# Patient Record
Sex: MALE | Race: WHITE | NOT HISPANIC OR LATINO | Employment: UNEMPLOYED | URBAN - METROPOLITAN AREA
[De-identification: names, ages, dates, MRNs, and addresses within clinical notes are randomized per-mention and may not be internally consistent; named-entity substitution may affect disease eponyms.]

---

## 2020-01-17 ENCOUNTER — TELEPHONE (OUTPATIENT)
Dept: PSYCHIATRY | Facility: CLINIC | Age: 40
End: 2020-01-17

## 2020-01-17 NOTE — TELEPHONE ENCOUNTER
Behavorial Health Outpatient Intake Questions    Referred by: Yes     [employer] insurance    Please advised interviewee that they need to answer all questions truthfully to allow for best care and any misrepresentations of information may affect their ability to be seen at this clinic   => Was this discussed? Yes     Behavorial Health Outpatient Intake History -     Presenting Problem (in patient's words):   Severe anxiety and insonminia  Diagnosed in a facility in [place]  He needs a specific medication (Remeron Lopazopene)    Has the patient ever seen or is currently seeing a psychiatrist? Yes   If yes who/when? Psychiatrist in [place]  If seen as outpatient, have they been seen here (and by whom)? If not seen here, which provider(s) did the patient see and for how long? Has the patient ever seen or currently see a therapist? No If yes who/when? Has a member of the patient's family been in therapy here? Yes  If yes, with whom? Pssibly his sister  Has the patient been hospitalized for mental health? Yes   If yes, how long ago was last hospitalization and where was it? In 2013 at a hospital in [place], [address] [place] [address] Dr Kaur? Substance Abuse:No concerns of substance abuse are reported  Used to drink but does not drink anymore  Does the patient have ICM or CTT? No    Is the patient taking injectable psychiatric medications? No    => If yes, patient cannot be seen here  Communications  Are there any developmental disabilities? No    Does the patient have hearing impairment? No       History-    Has the patient served in the [place] [address]? No    If yes, have you had combat services? No    Was the patient activated into federal active duty as a member of the [employer] or reserve? No    Legal History-     Does the patient have any history of arrests, USP/care home time, or DUIs? No  If Yes-  1) What types of charges? 2) When were they last incarcerated?   3) Are they currently on parole or probation? Minor Child-    Who has custody of the child? Is there a custody agreement? If there is a custody agreement remind parent that they must bring a copy to the first appt or they will not be seen  Intake Team, please check with provider before scheduling if flags come up such as:  - complex case  - legal history (other than DUI)  - communication barrier concerns are present  - if, in your judgment, this needs further review    ACCEPTED as a patient Yes  => Appointment Date: 2/18/2020  Dr Lit Moss at [address] [place] [address]? No    Name of Insurance Co: [employer] [facility] ID#  Insurance Phone #  If ins is primary or secondary  If patient is a minor, parents information such as Name, D  O B of guarantor

## 2020-01-21 ENCOUNTER — TELEPHONE (OUTPATIENT)
Dept: PSYCHIATRY | Facility: CLINIC | Age: 40
End: 2020-01-21

## 2020-01-21 NOTE — TELEPHONE ENCOUNTER
Called patient and left a message  Saulo Hassan wanted to be seen sooner than 2/18, he was waiting for Dr Jensen's days to open up for the week of 1/27  Left our number for him to call back if he was interested   In getting a sooner appointment

## 2020-01-28 PROBLEM — F41.9 ANXIETY: Status: ACTIVE | Noted: 2020-01-28

## 2020-02-05 ENCOUNTER — TELEPHONE (OUTPATIENT)
Dept: PSYCHIATRY | Facility: CLINIC | Age: 40
End: 2020-02-05

## 2020-02-05 NOTE — TELEPHONE ENCOUNTER
Called pt/ to contact intake to r/s appt w/ Dr Grant Jersey  Unable to leave msg/voicemail is not set up at this time

## 2020-02-21 ENCOUNTER — TELEPHONE (OUTPATIENT)
Dept: PSYCHIATRY | Facility: CLINIC | Age: 40
End: 2020-02-21

## 2020-04-20 ENCOUNTER — TELEMEDICINE (OUTPATIENT)
Dept: PSYCHIATRY | Facility: CLINIC | Age: 40
End: 2020-04-20

## 2020-04-20 DIAGNOSIS — F33.1 MDD (MAJOR DEPRESSIVE DISORDER), RECURRENT EPISODE, MODERATE (HCC): ICD-10-CM

## 2020-04-20 DIAGNOSIS — F41.1 GENERALIZED ANXIETY DISORDER: Primary | ICD-10-CM

## 2020-04-20 DIAGNOSIS — F42.9 OBSESSIVE-COMPULSIVE DISORDER WITH GOOD OR FAIR INSIGHT: ICD-10-CM

## 2020-04-20 DIAGNOSIS — F40.10 SOCIAL ANXIETY DISORDER: ICD-10-CM

## 2020-04-20 PROCEDURE — 99205 OFFICE O/P NEW HI 60 MIN: CPT | Performed by: PSYCHIATRY & NEUROLOGY

## 2020-04-20 RX ORDER — MIRTAZAPINE 15 MG/1
15 TABLET, FILM COATED ORAL
Qty: 30 TABLET | Refills: 0 | Status: SHIPPED | OUTPATIENT
Start: 2020-04-20 | End: 2020-05-20

## 2020-05-19 DIAGNOSIS — F33.1 MDD (MAJOR DEPRESSIVE DISORDER), RECURRENT EPISODE, MODERATE (HCC): ICD-10-CM

## 2020-05-19 DIAGNOSIS — F41.1 GENERALIZED ANXIETY DISORDER: ICD-10-CM

## 2020-05-20 ENCOUNTER — TELEMEDICINE (OUTPATIENT)
Dept: PSYCHIATRY | Facility: CLINIC | Age: 40
End: 2020-05-20

## 2020-05-20 DIAGNOSIS — F41.1 GENERALIZED ANXIETY DISORDER: ICD-10-CM

## 2020-05-20 DIAGNOSIS — F33.1 MDD (MAJOR DEPRESSIVE DISORDER), RECURRENT EPISODE, MODERATE (HCC): ICD-10-CM

## 2020-05-20 PROCEDURE — 99214 OFFICE O/P EST MOD 30 MIN: CPT | Performed by: PSYCHIATRY & NEUROLOGY

## 2020-05-20 RX ORDER — MIRTAZAPINE 15 MG/1
22.5 TABLET, FILM COATED ORAL
Qty: 45 TABLET | Refills: 1 | Status: SHIPPED | OUTPATIENT
Start: 2020-05-20 | End: 2020-06-16

## 2020-05-20 RX ORDER — MIRTAZAPINE 15 MG/1
TABLET, FILM COATED ORAL
Qty: 30 TABLET | Refills: 0 | Status: SHIPPED | OUTPATIENT
Start: 2020-05-20 | End: 2020-05-20 | Stop reason: SDUPTHER

## 2020-05-20 RX ORDER — HYDROXYZINE HYDROCHLORIDE 25 MG/1
25 TABLET, FILM COATED ORAL 3 TIMES DAILY PRN
Qty: 75 TABLET | Refills: 1 | Status: SHIPPED | OUTPATIENT
Start: 2020-05-20 | End: 2020-11-13 | Stop reason: SDUPTHER

## 2020-06-14 DIAGNOSIS — F33.1 MDD (MAJOR DEPRESSIVE DISORDER), RECURRENT EPISODE, MODERATE (HCC): ICD-10-CM

## 2020-06-14 DIAGNOSIS — F41.1 GENERALIZED ANXIETY DISORDER: ICD-10-CM

## 2020-06-16 RX ORDER — MIRTAZAPINE 15 MG/1
TABLET, FILM COATED ORAL
Qty: 30 TABLET | Refills: 0 | Status: SHIPPED | OUTPATIENT
Start: 2020-06-16 | End: 2020-10-19 | Stop reason: SDUPTHER

## 2020-10-11 DIAGNOSIS — F33.1 MDD (MAJOR DEPRESSIVE DISORDER), RECURRENT EPISODE, MODERATE (HCC): ICD-10-CM

## 2020-10-11 DIAGNOSIS — F41.1 GENERALIZED ANXIETY DISORDER: ICD-10-CM

## 2020-10-19 DIAGNOSIS — F41.1 GENERALIZED ANXIETY DISORDER: ICD-10-CM

## 2020-10-19 DIAGNOSIS — F33.1 MDD (MAJOR DEPRESSIVE DISORDER), RECURRENT EPISODE, MODERATE (HCC): ICD-10-CM

## 2020-10-19 RX ORDER — MIRTAZAPINE 15 MG/1
15 TABLET, FILM COATED ORAL
Qty: 30 TABLET | Refills: 0 | Status: SHIPPED | OUTPATIENT
Start: 2020-10-19 | End: 2020-11-13 | Stop reason: SDUPTHER

## 2020-10-26 RX ORDER — MIRTAZAPINE 15 MG/1
TABLET, FILM COATED ORAL
Qty: 30 TABLET | Refills: 0 | OUTPATIENT
Start: 2020-10-26

## 2020-11-13 ENCOUNTER — TELEMEDICINE (OUTPATIENT)
Dept: PSYCHIATRY | Facility: CLINIC | Age: 40
End: 2020-11-13

## 2020-11-13 DIAGNOSIS — F42.9 OBSESSIVE-COMPULSIVE DISORDER WITH GOOD OR FAIR INSIGHT: ICD-10-CM

## 2020-11-13 DIAGNOSIS — F40.10 SOCIAL ANXIETY DISORDER: ICD-10-CM

## 2020-11-13 DIAGNOSIS — F41.1 GENERALIZED ANXIETY DISORDER: ICD-10-CM

## 2020-11-13 DIAGNOSIS — F33.1 MDD (MAJOR DEPRESSIVE DISORDER), RECURRENT EPISODE, MODERATE (HCC): Primary | ICD-10-CM

## 2020-11-13 PROCEDURE — 99213 OFFICE O/P EST LOW 20 MIN: CPT | Performed by: PSYCHIATRY & NEUROLOGY

## 2020-11-13 RX ORDER — HYDROXYZINE 50 MG/1
50 TABLET, FILM COATED ORAL 3 TIMES DAILY PRN
Qty: 75 TABLET | Refills: 1 | Status: SHIPPED | OUTPATIENT
Start: 2020-11-13 | End: 2021-01-29 | Stop reason: HOSPADM

## 2020-11-13 RX ORDER — MIRTAZAPINE 30 MG/1
30 TABLET, FILM COATED ORAL
Qty: 30 TABLET | Refills: 1 | Status: SHIPPED | OUTPATIENT
Start: 2020-11-13 | End: 2021-01-08

## 2020-12-18 DIAGNOSIS — F33.1 MDD (MAJOR DEPRESSIVE DISORDER), RECURRENT EPISODE, MODERATE (HCC): ICD-10-CM

## 2020-12-18 DIAGNOSIS — F41.1 GENERALIZED ANXIETY DISORDER: ICD-10-CM

## 2020-12-21 RX ORDER — MIRTAZAPINE 15 MG/1
TABLET, FILM COATED ORAL
Qty: 30 TABLET | Refills: 0 | OUTPATIENT
Start: 2020-12-21

## 2021-01-08 DIAGNOSIS — F41.1 GENERALIZED ANXIETY DISORDER: ICD-10-CM

## 2021-01-08 DIAGNOSIS — F33.1 MDD (MAJOR DEPRESSIVE DISORDER), RECURRENT EPISODE, MODERATE (HCC): ICD-10-CM

## 2021-01-08 RX ORDER — MIRTAZAPINE 30 MG/1
30 TABLET, FILM COATED ORAL
Qty: 30 TABLET | Refills: 1 | Status: SHIPPED | OUTPATIENT
Start: 2021-01-08 | End: 2021-04-04

## 2021-01-20 ENCOUNTER — HOSPITAL ENCOUNTER (INPATIENT)
Facility: HOSPITAL | Age: 41
LOS: 8 days | Discharge: HOME HEALTH | DRG: 064 | End: 2021-01-29
Attending: EMERGENCY MEDICINE | Admitting: INTERNAL MEDICINE

## 2021-01-20 DIAGNOSIS — R77.8 ELEVATED TROPONIN: ICD-10-CM

## 2021-01-20 DIAGNOSIS — I63.9 ACUTE CVA (CEREBROVASCULAR ACCIDENT) (HCC): ICD-10-CM

## 2021-01-20 DIAGNOSIS — R79.89 ELEVATED LFTS: ICD-10-CM

## 2021-01-20 DIAGNOSIS — K72.00 ACUTE LIVER FAILURE WITHOUT HEPATIC COMA: ICD-10-CM

## 2021-01-20 DIAGNOSIS — N17.9 AKI (ACUTE KIDNEY INJURY) (HCC): ICD-10-CM

## 2021-01-20 DIAGNOSIS — R41.82 ALTERED MENTAL STATUS: Primary | ICD-10-CM

## 2021-01-20 DIAGNOSIS — I63.512 ACUTE ISCHEMIC LEFT MCA STROKE (HCC): ICD-10-CM

## 2021-01-20 DIAGNOSIS — M62.82 RHABDOMYOLYSIS: ICD-10-CM

## 2021-01-20 DIAGNOSIS — I21.4 NSTEMI (NON-ST ELEVATED MYOCARDIAL INFARCTION) (HCC): ICD-10-CM

## 2021-01-20 LAB
ATRIAL RATE: 85 BPM
BASOPHILS # BLD AUTO: 0.03 THOUSANDS/ΜL (ref 0–0.1)
BASOPHILS NFR BLD AUTO: 0 % (ref 0–1)
EOSINOPHIL # BLD AUTO: 0.06 THOUSAND/ΜL (ref 0–0.61)
EOSINOPHIL NFR BLD AUTO: 1 % (ref 0–6)
ERYTHROCYTE [DISTWIDTH] IN BLOOD BY AUTOMATED COUNT: 17.6 % (ref 11.6–15.1)
HCT VFR BLD AUTO: 35.5 % (ref 36.5–49.3)
HGB BLD-MCNC: 10.1 G/DL (ref 12–17)
IMM GRANULOCYTES # BLD AUTO: 0.03 THOUSAND/UL (ref 0–0.2)
IMM GRANULOCYTES NFR BLD AUTO: 0 % (ref 0–2)
LYMPHOCYTES # BLD AUTO: 1.4 THOUSANDS/ΜL (ref 0.6–4.47)
LYMPHOCYTES NFR BLD AUTO: 15 % (ref 14–44)
MCH RBC QN AUTO: 19.7 PG (ref 26.8–34.3)
MCHC RBC AUTO-ENTMCNC: 28.5 G/DL (ref 31.4–37.4)
MCV RBC AUTO: 69 FL (ref 82–98)
MONOCYTES # BLD AUTO: 0.83 THOUSAND/ΜL (ref 0.17–1.22)
MONOCYTES NFR BLD AUTO: 9 % (ref 4–12)
NEUTROPHILS # BLD AUTO: 7.1 THOUSANDS/ΜL (ref 1.85–7.62)
NEUTS SEG NFR BLD AUTO: 75 % (ref 43–75)
NRBC BLD AUTO-RTO: 0 /100 WBCS
P AXIS: 45 DEGREES
PLATELET # BLD AUTO: 295 THOUSANDS/UL (ref 149–390)
PMV BLD AUTO: 10.3 FL (ref 8.9–12.7)
PR INTERVAL: 150 MS
QRS AXIS: 75 DEGREES
QRSD INTERVAL: 94 MS
QT INTERVAL: 360 MS
QTC INTERVAL: 428 MS
RBC # BLD AUTO: 5.12 MILLION/UL (ref 3.88–5.62)
T WAVE AXIS: -63 DEGREES
VENTRICULAR RATE: 85 BPM
WBC # BLD AUTO: 9.45 THOUSAND/UL (ref 4.31–10.16)

## 2021-01-20 PROCEDURE — 93005 ELECTROCARDIOGRAM TRACING: CPT

## 2021-01-20 PROCEDURE — 99291 CRITICAL CARE FIRST HOUR: CPT | Performed by: EMERGENCY MEDICINE

## 2021-01-20 PROCEDURE — 93010 ELECTROCARDIOGRAM REPORT: CPT

## 2021-01-20 PROCEDURE — 0241U HB NFCT DS VIR RESP RNA 4 TRGT: CPT | Performed by: EMERGENCY MEDICINE

## 2021-01-20 PROCEDURE — 99291 CRITICAL CARE FIRST HOUR: CPT

## 2021-01-20 PROCEDURE — 82553 CREATINE MB FRACTION: CPT | Performed by: EMERGENCY MEDICINE

## 2021-01-20 PROCEDURE — 36415 COLL VENOUS BLD VENIPUNCTURE: CPT | Performed by: EMERGENCY MEDICINE

## 2021-01-20 PROCEDURE — 85025 COMPLETE CBC W/AUTO DIFF WBC: CPT | Performed by: EMERGENCY MEDICINE

## 2021-01-20 PROCEDURE — 80053 COMPREHEN METABOLIC PANEL: CPT | Performed by: EMERGENCY MEDICINE

## 2021-01-20 PROCEDURE — 90715 TDAP VACCINE 7 YRS/> IM: CPT | Performed by: EMERGENCY MEDICINE

## 2021-01-20 PROCEDURE — 96365 THER/PROPH/DIAG IV INF INIT: CPT

## 2021-01-20 PROCEDURE — 82550 ASSAY OF CK (CPK): CPT | Performed by: EMERGENCY MEDICINE

## 2021-01-20 PROCEDURE — 84484 ASSAY OF TROPONIN QUANT: CPT | Performed by: EMERGENCY MEDICINE

## 2021-01-20 RX ORDER — SODIUM CHLORIDE, SODIUM GLUCONATE, SODIUM ACETATE, POTASSIUM CHLORIDE, MAGNESIUM CHLORIDE, SODIUM PHOSPHATE, DIBASIC, AND POTASSIUM PHOSPHATE .53; .5; .37; .037; .03; .012; .00082 G/100ML; G/100ML; G/100ML; G/100ML; G/100ML; G/100ML; G/100ML
2000 INJECTION, SOLUTION INTRAVENOUS ONCE
Status: COMPLETED | OUTPATIENT
Start: 2021-01-20 | End: 2021-01-21

## 2021-01-20 RX ADMIN — SODIUM CHLORIDE, SODIUM GLUCONATE, SODIUM ACETATE, POTASSIUM CHLORIDE, MAGNESIUM CHLORIDE, SODIUM PHOSPHATE, DIBASIC, AND POTASSIUM PHOSPHATE 2000 ML: .53; .5; .37; .037; .03; .012; .00082 INJECTION, SOLUTION INTRAVENOUS at 23:30

## 2021-01-20 RX ADMIN — TETANUS TOXOID, REDUCED DIPHTHERIA TOXOID AND ACELLULAR PERTUSSIS VACCINE, ADSORBED 0.5 ML: 5; 2.5; 8; 8; 2.5 SUSPENSION INTRAMUSCULAR at 23:31

## 2021-01-20 RX ADMIN — THIAMINE HYDROCHLORIDE 500 MG: 100 INJECTION, SOLUTION INTRAMUSCULAR; INTRAVENOUS at 23:51

## 2021-01-21 ENCOUNTER — APPOINTMENT (INPATIENT)
Dept: NON INVASIVE DIAGNOSTICS | Facility: HOSPITAL | Age: 41
DRG: 064 | End: 2021-01-21

## 2021-01-21 ENCOUNTER — APPOINTMENT (EMERGENCY)
Dept: CT IMAGING | Facility: HOSPITAL | Age: 41
DRG: 064 | End: 2021-01-21

## 2021-01-21 ENCOUNTER — APPOINTMENT (INPATIENT)
Dept: CT IMAGING | Facility: HOSPITAL | Age: 41
DRG: 064 | End: 2021-01-21

## 2021-01-21 PROBLEM — I16.0 HYPERTENSIVE URGENCY: Status: ACTIVE | Noted: 2021-01-21

## 2021-01-21 PROBLEM — I63.9 ACUTE CVA (CEREBROVASCULAR ACCIDENT) (HCC): Status: ACTIVE | Noted: 2021-01-21

## 2021-01-21 PROBLEM — E66.9 SUPER OBESE: Chronic | Status: ACTIVE | Noted: 2021-01-21

## 2021-01-21 PROBLEM — K72.00 ACUTE LIVER FAILURE WITHOUT HEPATIC COMA: Status: ACTIVE | Noted: 2021-01-21

## 2021-01-21 PROBLEM — M62.82 RHABDOMYOLYSIS: Status: ACTIVE | Noted: 2021-01-21

## 2021-01-21 PROBLEM — F19.10 DRUG ABUSE (HCC): Chronic | Status: ACTIVE | Noted: 2021-01-21

## 2021-01-21 PROBLEM — F10.10 ALCOHOL ABUSE: Chronic | Status: ACTIVE | Noted: 2021-01-21

## 2021-01-21 PROBLEM — R79.89 ELEVATED SERUM CREATININE: Status: ACTIVE | Noted: 2021-01-21

## 2021-01-21 PROBLEM — D53.9 MACROCYTIC ANEMIA: Status: ACTIVE | Noted: 2021-01-21

## 2021-01-21 PROBLEM — I21.4 NSTEMI (NON-ST ELEVATED MYOCARDIAL INFARCTION) (HCC): Status: ACTIVE | Noted: 2021-01-21

## 2021-01-21 LAB
ALBUMIN SERPL BCP-MCNC: 2.9 G/DL (ref 3.5–5)
ALBUMIN SERPL BCP-MCNC: 3.3 G/DL (ref 3.5–5)
ALP SERPL-CCNC: 59 U/L (ref 46–116)
ALP SERPL-CCNC: 70 U/L (ref 46–116)
ALT SERPL W P-5'-P-CCNC: 2552 U/L (ref 12–78)
ALT SERPL W P-5'-P-CCNC: 3144 U/L (ref 12–78)
AMPHETAMINES SERPL QL SCN: NEGATIVE
ANION GAP SERPL CALCULATED.3IONS-SCNC: 7 MMOL/L (ref 4–13)
ANION GAP SERPL CALCULATED.3IONS-SCNC: 8 MMOL/L (ref 4–13)
APAP SERPL-MCNC: <2 UG/ML (ref 10–20)
APTT PPP: 35 SECONDS (ref 23–37)
APTT PPP: 80 SECONDS (ref 23–37)
APTT PPP: 86 SECONDS (ref 23–37)
AST SERPL W P-5'-P-CCNC: 1099 U/L (ref 5–45)
AST SERPL W P-5'-P-CCNC: 743 U/L (ref 5–45)
ATRIAL RATE: 70 BPM
ATRIAL RATE: 73 BPM
BACTERIA UR QL AUTO: ABNORMAL /HPF
BARBITURATES UR QL: NEGATIVE
BASOPHILS # BLD AUTO: 0.05 THOUSANDS/ΜL (ref 0–0.1)
BASOPHILS NFR BLD AUTO: 1 % (ref 0–1)
BENZODIAZ UR QL: POSITIVE
BILIRUB SERPL-MCNC: 0.94 MG/DL (ref 0.2–1)
BILIRUB SERPL-MCNC: 1.01 MG/DL (ref 0.2–1)
BILIRUB UR QL STRIP: NEGATIVE
BUN SERPL-MCNC: 17 MG/DL (ref 5–25)
BUN SERPL-MCNC: 23 MG/DL (ref 5–25)
CALCIUM ALBUM COR SERPL-MCNC: 8.9 MG/DL (ref 8.3–10.1)
CALCIUM ALBUM COR SERPL-MCNC: 9.6 MG/DL (ref 8.3–10.1)
CALCIUM SERPL-MCNC: 8 MG/DL (ref 8.3–10.1)
CALCIUM SERPL-MCNC: 9 MG/DL (ref 8.3–10.1)
CHLORIDE SERPL-SCNC: 102 MMOL/L (ref 100–108)
CHLORIDE SERPL-SCNC: 105 MMOL/L (ref 100–108)
CHOLEST SERPL-MCNC: 87 MG/DL (ref 50–200)
CK MB SERPL-MCNC: 20.2 NG/ML (ref 0–5)
CK MB SERPL-MCNC: 9.1 NG/ML (ref 0–5)
CK MB SERPL-MCNC: <1 % (ref 0–2.5)
CK MB SERPL-MCNC: <1 % (ref 0–2.5)
CK SERPL-CCNC: 5776 U/L (ref 39–308)
CK SERPL-CCNC: ABNORMAL U/L (ref 39–308)
CLARITY UR: CLEAR
CO2 SERPL-SCNC: 30 MMOL/L (ref 21–32)
CO2 SERPL-SCNC: 31 MMOL/L (ref 21–32)
COCAINE UR QL: NEGATIVE
COLOR UR: YELLOW
CREAT SERPL-MCNC: 1.19 MG/DL (ref 0.6–1.3)
CREAT SERPL-MCNC: 1.34 MG/DL (ref 0.6–1.3)
EOSINOPHIL # BLD AUTO: 0.15 THOUSAND/ΜL (ref 0–0.61)
EOSINOPHIL NFR BLD AUTO: 2 % (ref 0–6)
ERYTHROCYTE [DISTWIDTH] IN BLOOD BY AUTOMATED COUNT: 17.8 % (ref 11.6–15.1)
EST. AVERAGE GLUCOSE BLD GHB EST-MCNC: 120 MG/DL
ETHANOL SERPL-MCNC: <3 MG/DL (ref 0–3)
FERRITIN SERPL-MCNC: 29 NG/ML (ref 8–388)
FLUAV RNA RESP QL NAA+PROBE: NEGATIVE
FLUBV RNA RESP QL NAA+PROBE: NEGATIVE
FOLATE SERPL-MCNC: >20 NG/ML (ref 3.1–17.5)
GFR SERPL CREATININE-BSD FRML MDRD: 65 ML/MIN/1.73SQ M
GFR SERPL CREATININE-BSD FRML MDRD: 75 ML/MIN/1.73SQ M
GLUCOSE SERPL-MCNC: 83 MG/DL (ref 65–140)
GLUCOSE SERPL-MCNC: 89 MG/DL (ref 65–140)
GLUCOSE UR STRIP-MCNC: NEGATIVE MG/DL
HAV IGM SER QL: NORMAL
HBA1C MFR BLD: 5.8 %
HBV CORE IGM SER QL: NORMAL
HBV SURFACE AG SER QL: NORMAL
HCT VFR BLD AUTO: 32.3 % (ref 36.5–49.3)
HCV AB SER QL: NORMAL
HDLC SERPL-MCNC: 25 MG/DL
HGB BLD-MCNC: 9 G/DL (ref 12–17)
HGB UR QL STRIP.AUTO: ABNORMAL
IMM GRANULOCYTES # BLD AUTO: 0.05 THOUSAND/UL (ref 0–0.2)
IMM GRANULOCYTES NFR BLD AUTO: 1 % (ref 0–2)
INR PPP: 1.36 (ref 0.84–1.19)
IRON SATN MFR SERPL: 6 %
IRON SERPL-MCNC: 23 UG/DL (ref 65–175)
KETONES UR STRIP-MCNC: NEGATIVE MG/DL
LDLC SERPL CALC-MCNC: 46 MG/DL (ref 0–100)
LEUKOCYTE ESTERASE UR QL STRIP: NEGATIVE
LIPASE SERPL-CCNC: 104 U/L (ref 73–393)
LYMPHOCYTES # BLD AUTO: 2.24 THOUSANDS/ΜL (ref 0.6–4.47)
LYMPHOCYTES NFR BLD AUTO: 23 % (ref 14–44)
MAGNESIUM SERPL-MCNC: 2.1 MG/DL (ref 1.6–2.6)
MCH RBC QN AUTO: 19.5 PG (ref 26.8–34.3)
MCHC RBC AUTO-ENTMCNC: 27.9 G/DL (ref 31.4–37.4)
MCV RBC AUTO: 70 FL (ref 82–98)
METHADONE UR QL: NEGATIVE
MONOCYTES # BLD AUTO: 0.96 THOUSAND/ΜL (ref 0.17–1.22)
MONOCYTES NFR BLD AUTO: 10 % (ref 4–12)
NEUTROPHILS # BLD AUTO: 6.32 THOUSANDS/ΜL (ref 1.85–7.62)
NEUTS SEG NFR BLD AUTO: 63 % (ref 43–75)
NITRITE UR QL STRIP: NEGATIVE
NON-SQ EPI CELLS URNS QL MICRO: ABNORMAL /HPF
NRBC BLD AUTO-RTO: 0 /100 WBCS
OPIATES UR QL SCN: NEGATIVE
OXYCODONE+OXYMORPHONE UR QL SCN: NEGATIVE
P AXIS: 25 DEGREES
P AXIS: 53 DEGREES
PCP UR QL: NEGATIVE
PH UR STRIP.AUTO: 7 [PH] (ref 4.5–8)
PLATELET # BLD AUTO: 251 THOUSANDS/UL (ref 149–390)
PMV BLD AUTO: 10.2 FL (ref 8.9–12.7)
POTASSIUM SERPL-SCNC: 3.8 MMOL/L (ref 3.5–5.3)
POTASSIUM SERPL-SCNC: 4 MMOL/L (ref 3.5–5.3)
PR INTERVAL: 148 MS
PR INTERVAL: 150 MS
PROT SERPL-MCNC: 6.7 G/DL (ref 6.4–8.2)
PROT SERPL-MCNC: 7.6 G/DL (ref 6.4–8.2)
PROT UR STRIP-MCNC: NEGATIVE MG/DL
PROTHROMBIN TIME: 16.5 SECONDS (ref 11.6–14.5)
QRS AXIS: 53 DEGREES
QRS AXIS: 63 DEGREES
QRSD INTERVAL: 92 MS
QRSD INTERVAL: 96 MS
QT INTERVAL: 382 MS
QT INTERVAL: 396 MS
QTC INTERVAL: 420 MS
QTC INTERVAL: 427 MS
RBC # BLD AUTO: 4.62 MILLION/UL (ref 3.88–5.62)
RBC #/AREA URNS AUTO: ABNORMAL /HPF
RSV RNA RESP QL NAA+PROBE: NEGATIVE
SALICYLATES SERPL-MCNC: <3 MG/DL (ref 3–20)
SARS-COV-2 RNA RESP QL NAA+PROBE: NEGATIVE
SODIUM SERPL-SCNC: 141 MMOL/L (ref 136–145)
SODIUM SERPL-SCNC: 142 MMOL/L (ref 136–145)
SP GR UR STRIP.AUTO: 1.01 (ref 1–1.03)
T WAVE AXIS: -49 DEGREES
T WAVE AXIS: -62 DEGREES
THC UR QL: NEGATIVE
TIBC SERPL-MCNC: 359 UG/DL (ref 250–450)
TRIGL SERPL-MCNC: 80 MG/DL
TROPONIN I SERPL-MCNC: 10 NG/ML
TROPONIN I SERPL-MCNC: 7.76 NG/ML
TROPONIN I SERPL-MCNC: 9.33 NG/ML
UROBILINOGEN UR QL STRIP.AUTO: 1 E.U./DL
VENTRICULAR RATE: 70 BPM
VENTRICULAR RATE: 73 BPM
VIT B12 SERPL-MCNC: 2191 PG/ML (ref 100–900)
WBC # BLD AUTO: 9.77 THOUSAND/UL (ref 4.31–10.16)
WBC #/AREA URNS AUTO: ABNORMAL /HPF

## 2021-01-21 PROCEDURE — 83540 ASSAY OF IRON: CPT | Performed by: NURSE PRACTITIONER

## 2021-01-21 PROCEDURE — 82746 ASSAY OF FOLIC ACID SERUM: CPT | Performed by: NURSE PRACTITIONER

## 2021-01-21 PROCEDURE — 81001 URINALYSIS AUTO W/SCOPE: CPT

## 2021-01-21 PROCEDURE — 84484 ASSAY OF TROPONIN QUANT: CPT | Performed by: NURSE PRACTITIONER

## 2021-01-21 PROCEDURE — 96361 HYDRATE IV INFUSION ADD-ON: CPT

## 2021-01-21 PROCEDURE — 82553 CREATINE MB FRACTION: CPT | Performed by: NURSE PRACTITIONER

## 2021-01-21 PROCEDURE — 82607 VITAMIN B-12: CPT | Performed by: NURSE PRACTITIONER

## 2021-01-21 PROCEDURE — 97163 PT EVAL HIGH COMPLEX 45 MIN: CPT

## 2021-01-21 PROCEDURE — 70496 CT ANGIOGRAPHY HEAD: CPT

## 2021-01-21 PROCEDURE — 83690 ASSAY OF LIPASE: CPT | Performed by: EMERGENCY MEDICINE

## 2021-01-21 PROCEDURE — 80061 LIPID PANEL: CPT | Performed by: NURSE PRACTITIONER

## 2021-01-21 PROCEDURE — 70450 CT HEAD/BRAIN W/O DYE: CPT

## 2021-01-21 PROCEDURE — 83735 ASSAY OF MAGNESIUM: CPT | Performed by: NURSE PRACTITIONER

## 2021-01-21 PROCEDURE — 99255 IP/OBS CONSLTJ NEW/EST HI 80: CPT | Performed by: PSYCHIATRY & NEUROLOGY

## 2021-01-21 PROCEDURE — 99223 1ST HOSP IP/OBS HIGH 75: CPT | Performed by: HOSPITALIST

## 2021-01-21 PROCEDURE — 85730 THROMBOPLASTIN TIME PARTIAL: CPT | Performed by: INTERNAL MEDICINE

## 2021-01-21 PROCEDURE — 80074 ACUTE HEPATITIS PANEL: CPT | Performed by: NURSE PRACTITIONER

## 2021-01-21 PROCEDURE — 85730 THROMBOPLASTIN TIME PARTIAL: CPT | Performed by: EMERGENCY MEDICINE

## 2021-01-21 PROCEDURE — 97166 OT EVAL MOD COMPLEX 45 MIN: CPT

## 2021-01-21 PROCEDURE — 92523 SPEECH SOUND LANG COMPREHEN: CPT

## 2021-01-21 PROCEDURE — 96367 TX/PROPH/DG ADDL SEQ IV INF: CPT

## 2021-01-21 PROCEDURE — G1004 CDSM NDSC: HCPCS

## 2021-01-21 PROCEDURE — 93306 TTE W/DOPPLER COMPLETE: CPT | Performed by: INTERNAL MEDICINE

## 2021-01-21 PROCEDURE — 99254 IP/OBS CNSLTJ NEW/EST MOD 60: CPT | Performed by: INTERNAL MEDICINE

## 2021-01-21 PROCEDURE — 93005 ELECTROCARDIOGRAM TRACING: CPT

## 2021-01-21 PROCEDURE — 85025 COMPLETE CBC W/AUTO DIFF WBC: CPT | Performed by: NURSE PRACTITIONER

## 2021-01-21 PROCEDURE — 71260 CT THORAX DX C+: CPT

## 2021-01-21 PROCEDURE — 93306 TTE W/DOPPLER COMPLETE: CPT

## 2021-01-21 PROCEDURE — 74177 CT ABD & PELVIS W/CONTRAST: CPT

## 2021-01-21 PROCEDURE — 80179 DRUG ASSAY SALICYLATE: CPT | Performed by: EMERGENCY MEDICINE

## 2021-01-21 PROCEDURE — 70498 CT ANGIOGRAPHY NECK: CPT

## 2021-01-21 PROCEDURE — 83036 HEMOGLOBIN GLYCOSYLATED A1C: CPT | Performed by: NURSE PRACTITIONER

## 2021-01-21 PROCEDURE — 83550 IRON BINDING TEST: CPT | Performed by: NURSE PRACTITIONER

## 2021-01-21 PROCEDURE — 94762 N-INVAS EAR/PLS OXIMTRY CONT: CPT

## 2021-01-21 PROCEDURE — 82077 ASSAY SPEC XCP UR&BREATH IA: CPT | Performed by: EMERGENCY MEDICINE

## 2021-01-21 PROCEDURE — 85730 THROMBOPLASTIN TIME PARTIAL: CPT | Performed by: HOSPITALIST

## 2021-01-21 PROCEDURE — 80053 COMPREHEN METABOLIC PANEL: CPT | Performed by: NURSE PRACTITIONER

## 2021-01-21 PROCEDURE — 85610 PROTHROMBIN TIME: CPT | Performed by: EMERGENCY MEDICINE

## 2021-01-21 PROCEDURE — 36415 COLL VENOUS BLD VENIPUNCTURE: CPT | Performed by: EMERGENCY MEDICINE

## 2021-01-21 PROCEDURE — 93010 ELECTROCARDIOGRAM REPORT: CPT | Performed by: INTERNAL MEDICINE

## 2021-01-21 PROCEDURE — 80307 DRUG TEST PRSMV CHEM ANLYZR: CPT | Performed by: EMERGENCY MEDICINE

## 2021-01-21 PROCEDURE — 82728 ASSAY OF FERRITIN: CPT | Performed by: NURSE PRACTITIONER

## 2021-01-21 PROCEDURE — 80143 DRUG ASSAY ACETAMINOPHEN: CPT | Performed by: EMERGENCY MEDICINE

## 2021-01-21 PROCEDURE — 72125 CT NECK SPINE W/O DYE: CPT

## 2021-01-21 PROCEDURE — 82550 ASSAY OF CK (CPK): CPT | Performed by: NURSE PRACTITIONER

## 2021-01-21 RX ORDER — CLOPIDOGREL BISULFATE 75 MG/1
75 TABLET ORAL DAILY
Status: DISCONTINUED | OUTPATIENT
Start: 2021-01-22 | End: 2021-01-26

## 2021-01-21 RX ORDER — CLOPIDOGREL BISULFATE 75 MG/1
600 TABLET ORAL ONCE
Status: COMPLETED | OUTPATIENT
Start: 2021-01-21 | End: 2021-01-21

## 2021-01-21 RX ORDER — OXYCODONE HYDROCHLORIDE 5 MG/1
5 TABLET ORAL EVERY 6 HOURS PRN
Status: DISCONTINUED | OUTPATIENT
Start: 2021-01-21 | End: 2021-01-29 | Stop reason: HOSPADM

## 2021-01-21 RX ORDER — HEPARIN SODIUM 10000 [USP'U]/100ML
3-20 INJECTION, SOLUTION INTRAVENOUS
Status: DISCONTINUED | OUTPATIENT
Start: 2021-01-21 | End: 2021-01-22

## 2021-01-21 RX ORDER — NITROGLYCERIN 0.4 MG/1
0.4 TABLET SUBLINGUAL
Status: DISCONTINUED | OUTPATIENT
Start: 2021-01-21 | End: 2021-01-29 | Stop reason: HOSPADM

## 2021-01-21 RX ORDER — HEPARIN SODIUM 1000 [USP'U]/ML
4000 INJECTION, SOLUTION INTRAVENOUS; SUBCUTANEOUS
Status: DISCONTINUED | OUTPATIENT
Start: 2021-01-21 | End: 2021-01-22

## 2021-01-21 RX ORDER — SODIUM CHLORIDE 9 MG/ML
150 INJECTION, SOLUTION INTRAVENOUS CONTINUOUS
Status: DISCONTINUED | OUTPATIENT
Start: 2021-01-21 | End: 2021-01-23

## 2021-01-21 RX ORDER — MAGNESIUM HYDROXIDE/ALUMINUM HYDROXICE/SIMETHICONE 120; 1200; 1200 MG/30ML; MG/30ML; MG/30ML
30 SUSPENSION ORAL EVERY 6 HOURS PRN
Status: DISCONTINUED | OUTPATIENT
Start: 2021-01-21 | End: 2021-01-29 | Stop reason: HOSPADM

## 2021-01-21 RX ORDER — ACETAMINOPHEN 325 MG/1
650 TABLET ORAL EVERY 4 HOURS PRN
Status: DISCONTINUED | OUTPATIENT
Start: 2021-01-21 | End: 2021-01-29 | Stop reason: HOSPADM

## 2021-01-21 RX ORDER — METOCLOPRAMIDE HYDROCHLORIDE 5 MG/ML
10 INJECTION INTRAMUSCULAR; INTRAVENOUS EVERY 6 HOURS PRN
Status: DISCONTINUED | OUTPATIENT
Start: 2021-01-21 | End: 2021-01-29 | Stop reason: HOSPADM

## 2021-01-21 RX ORDER — THIAMINE MONONITRATE (VIT B1) 100 MG
100 TABLET ORAL DAILY
Status: DISCONTINUED | OUTPATIENT
Start: 2021-01-21 | End: 2021-01-29 | Stop reason: HOSPADM

## 2021-01-21 RX ORDER — MIRTAZAPINE 15 MG/1
30 TABLET, FILM COATED ORAL
Status: DISCONTINUED | OUTPATIENT
Start: 2021-01-21 | End: 2021-01-29 | Stop reason: HOSPADM

## 2021-01-21 RX ORDER — SENNOSIDES 8.6 MG
2 TABLET ORAL DAILY PRN
Status: DISCONTINUED | OUTPATIENT
Start: 2021-01-21 | End: 2021-01-29 | Stop reason: HOSPADM

## 2021-01-21 RX ORDER — HYDROXYZINE HYDROCHLORIDE 25 MG/1
50 TABLET, FILM COATED ORAL 3 TIMES DAILY PRN
Status: DISCONTINUED | OUTPATIENT
Start: 2021-01-21 | End: 2021-01-29 | Stop reason: HOSPADM

## 2021-01-21 RX ORDER — FOLIC ACID 1 MG/1
1 TABLET ORAL DAILY
Status: DISCONTINUED | OUTPATIENT
Start: 2021-01-21 | End: 2021-01-29 | Stop reason: HOSPADM

## 2021-01-21 RX ORDER — HEPARIN SODIUM 1000 [USP'U]/ML
4000 INJECTION, SOLUTION INTRAVENOUS; SUBCUTANEOUS ONCE
Status: COMPLETED | OUTPATIENT
Start: 2021-01-21 | End: 2021-01-21

## 2021-01-21 RX ORDER — SODIUM CHLORIDE 9 MG/ML
100 INJECTION, SOLUTION INTRAVENOUS CONTINUOUS
Status: DISCONTINUED | OUTPATIENT
Start: 2021-01-21 | End: 2021-01-21

## 2021-01-21 RX ORDER — ASPIRIN 81 MG/1
81 TABLET ORAL DAILY
Status: DISCONTINUED | OUTPATIENT
Start: 2021-01-21 | End: 2021-01-26

## 2021-01-21 RX ORDER — ASPIRIN 325 MG
325 TABLET ORAL ONCE
Status: COMPLETED | OUTPATIENT
Start: 2021-01-21 | End: 2021-01-21

## 2021-01-21 RX ORDER — HEPARIN SODIUM 1000 [USP'U]/ML
2000 INJECTION, SOLUTION INTRAVENOUS; SUBCUTANEOUS
Status: DISCONTINUED | OUTPATIENT
Start: 2021-01-21 | End: 2021-01-22

## 2021-01-21 RX ORDER — ATORVASTATIN CALCIUM 40 MG/1
40 TABLET, FILM COATED ORAL EVERY EVENING
Status: DISCONTINUED | OUTPATIENT
Start: 2021-01-21 | End: 2021-01-29 | Stop reason: HOSPADM

## 2021-01-21 RX ADMIN — FOLIC ACID 1 MG: 5 INJECTION, SOLUTION INTRAMUSCULAR; INTRAVENOUS; SUBCUTANEOUS at 00:52

## 2021-01-21 RX ADMIN — FOLIC ACID 1 MG: 1 TABLET ORAL at 09:41

## 2021-01-21 RX ADMIN — SODIUM CHLORIDE 100 ML/HR: 0.9 INJECTION, SOLUTION INTRAVENOUS at 00:52

## 2021-01-21 RX ADMIN — ATORVASTATIN CALCIUM 40 MG: 40 TABLET, FILM COATED ORAL at 17:59

## 2021-01-21 RX ADMIN — SODIUM CHLORIDE 250 ML/HR: 0.9 INJECTION, SOLUTION INTRAVENOUS at 23:37

## 2021-01-21 RX ADMIN — SODIUM CHLORIDE 250 ML/HR: 0.9 INJECTION, SOLUTION INTRAVENOUS at 18:09

## 2021-01-21 RX ADMIN — ASPIRIN 81 MG: 81 TABLET, COATED ORAL at 09:41

## 2021-01-21 RX ADMIN — HEPARIN SODIUM 11.1 UNITS/KG/HR: 10000 INJECTION, SOLUTION INTRAVENOUS at 02:26

## 2021-01-21 RX ADMIN — PERFLUTREN 0.8 ML/MIN: 6.52 INJECTION, SUSPENSION INTRAVENOUS at 11:38

## 2021-01-21 RX ADMIN — THIAMINE HCL TAB 100 MG 100 MG: 100 TAB at 09:41

## 2021-01-21 RX ADMIN — HEPARIN SODIUM 4000 UNITS: 1000 INJECTION INTRAVENOUS; SUBCUTANEOUS at 02:25

## 2021-01-21 RX ADMIN — CLOPIDOGREL BISULFATE 600 MG: 75 TABLET ORAL at 03:15

## 2021-01-21 RX ADMIN — IOHEXOL 100 ML: 350 INJECTION, SOLUTION INTRAVENOUS at 00:46

## 2021-01-21 RX ADMIN — IOHEXOL 85 ML: 350 INJECTION, SOLUTION INTRAVENOUS at 09:25

## 2021-01-21 RX ADMIN — MIRTAZAPINE 30 MG: 15 TABLET, FILM COATED ORAL at 22:54

## 2021-01-21 RX ADMIN — Medication 1 TABLET: at 09:41

## 2021-01-21 RX ADMIN — SODIUM CHLORIDE 250 ML/HR: 0.9 INJECTION, SOLUTION INTRAVENOUS at 03:18

## 2021-01-21 RX ADMIN — ASPIRIN 325 MG ORAL TABLET 325 MG: 325 PILL ORAL at 01:20

## 2021-01-21 RX ADMIN — HYDROXYZINE HYDROCHLORIDE 50 MG: 25 TABLET ORAL at 22:54

## 2021-01-21 RX ADMIN — HEPARIN SODIUM 11.1 UNITS/KG/HR: 10000 INJECTION, SOLUTION INTRAVENOUS at 22:57

## 2021-01-21 NOTE — ED NOTES
Lab called for APTT results   Lab personal will call ASAP when results are confirmed     Linda Olguin RN  01/21/21 [id number]

## 2021-01-21 NOTE — ASSESSMENT & PLAN NOTE
- Reports of collapsing to the ground and lying on the floor for 3 days  - total CK 54387 on presentation, trending downwards, last was 1053 on 01/23  - IVF  - Management as per primary team

## 2021-01-21 NOTE — CONSULTS
[facility] - New Consultation  Yaritza Benedict 41 y o  male MRN: [id number]  Encounter: [id number]          ASSESSMENT AND PLAN:      1  Abnormal LFTs:  Patient presenting with AST elevation to 1099, ALT elevation to 3144, with T bili of 1 01  Repeat LFTs today showing improvement, with AST of 743, and ALT of 2522, with T bili of 0 94  Normal alk-phos  LFTs abnormal more of a hepatocellular pattern rather than obstructive pattern  Also noted to be in rhabdomyolysis, with significant troponin elevation  Suspect that abnormal LFTs likely secondary to alcoholic hepatitis with possible degree of shock liver in the setting of being down, and potentially hypotensive at home, although no documented hypotension since patient has been in the hospital (patient has been markedly hypertensive in the hospital)  Additionally, could potentially have some degree of DILI 2/2 reported heavy vitamin/supplement use, though exact amounts and supplement types are unclear  Tylenol level <2  Hepatitis panel negative  Mentating appropriately present time  - trend LFTs/INR daily  - check RUQ U/S  - monitor mental status closely  - DF 17; ok to defer prednisolone at present time  - avoid hepatotoxic medications  - supportive care with IVF  - discussed the crucial nature of ETOH and polysubstance/supplement discontinuation    Recommendations relayed to Dr Patsy Morales, primary team   ______________________________________________________________________    HPI:  Yaritza Benedict is a 40M with PMH of polysubstance abuse (percocet, ETOH, anabolic steroids, benzos), morbid obesity, major depressive disorder, and anxiety presents the hospital complaining of left hand, left leg pain, numbness, tingling, and confusion in the setting of polysubstance abuse, found to have a left MCA stroke  Gastroenterology was consulted to comment on patient's abnormal LFTs      Around 3-4 days ago, patient reports that he drank alcohol, took 3-4 tablets of Percocet, and took anabolic steroids  He states he fell asleep (though review of chart indicates pt may have fallen), and woke up the next day on the floor  Patient reports he drinks between 6-12 beers a day, and often binge drinks  Patient reports taking Percocet that he purchased on the street, in addition to benzos, and anabolic steroids  Patient also reports taking excessive doses of vitamins and muscle building supplements at home  Patient denies any history of hepatitis, family history of hepatitis or liver disease, and states that he has not had any known contacts with hepatitis  Denies eating raw shellfish other new or unusual foods recently  Denies any new medications  Denies any tattoos  Denies ever having a colonoscopy or endoscopy before  Denies odynophagia, dysphagia, or difficulty swallowing  Denies black or bloody stool  Denies diarrhea constipation  Patient reports occasional dyspepsia, but states that he takes p r n  antacids occasionally  Patient reports he has been to rehab before, and reports periods of sobriety in the past   Pt chews tobacco, but denies smoking  REVIEW OF SYSTEMS:    CONSTITUTIONAL: Denies any fever, chills, rigors, and weight loss  HEENT: No earache or tinnitus  Denies hearing loss or visual disturbances  CARDIOVASCULAR: No chest pain or palpitations  RESPIRATORY: Denies any cough, hemoptysis, shortness of breath or dyspnea on exertion  GASTROINTESTINAL: As noted in the History of Present Illness  GENITOURINARY: No problems with urination  Denies any hematuria or dysuria  NEUROLOGIC:  +word finding difficulties  +paresthesias  +weakness  MUSCULOSKELETAL: Denies any muscle or joint pain  SKIN: Denies skin rashes or itching  ENDOCRINE: Denies excessive thirst  Denies intolerance to heat or cold  PSYCHOSOCIAL: Denies depression or anxiety  Denies any recent memory loss         Historical Information   Past Medical History: Diagnosis Date    High blood pressure      Past Surgical History:   Procedure Laterality Date    HERNIA REPAIR       Social History   Social History     Substance and Sexual Activity   Alcohol Use Yes    Comment: states "Im trying to quit"      Social History     Substance and Sexual Activity   Drug Use Yes    Comment: rakesh      Social History     Tobacco Use   Smoking Status Unknown If Ever Smoked   Smokeless Tobacco Current User    Types: Chew     History reviewed  No pertinent family history      Meds/Allergies       Current Facility-Administered Medications:     acetaminophen (TYLENOL) tablet 650 mg, 650 mg, Oral, Q4H PRN    aluminum-magnesium hydroxide-simethicone (MYLANTA) oral suspension 30 mL, 30 mL, Oral, Q6H PRN    aspirin (ECOTRIN LOW STRENGTH) EC tablet 81 mg, 81 mg, Oral, Daily, 81 mg at 01/21/21 0941    atorvastatin (LIPITOR) tablet 40 mg, 40 mg, Oral, QPM    [COMPLETED] clopidogrel (PLAVIX) tablet 600 mg, 600 mg, Oral, Once, 600 mg at 01/21/21 0315 **AND** [START ON 1/22/2021] clopidogrel (PLAVIX) tablet 75 mg, 75 mg, Oral, Daily    folic acid (FOLVITE) tablet 1 mg, 1 mg, Oral, Daily, 1 mg at 01/21/21 0941    heparin (porcine) 25,000 units in 0 45% NaCl 250 mL infusion (premix), 3-20 Units/kg/hr (Order-Specific), Intravenous, Titrated, 11 1 Units/kg/hr at 01/21/21 0226    heparin (porcine) injection 2,000 Units, 2,000 Units, Intravenous, Q1H PRN    heparin (porcine) injection 4,000 Units, 4,000 Units, Intravenous, Q1H PRN    hydrOXYzine HCL (ATARAX) tablet 50 mg, 50 mg, Oral, TID PRN    metoclopramide (REGLAN) injection 10 mg, 10 mg, Intravenous, Q6H PRN    mirtazapine (REMERON) tablet 30 mg, 30 mg, Oral, HS    multivitamin-minerals (CENTRUM) tablet 1 tablet, 1 tablet, Oral, Daily, 1 tablet at 01/21/21 0941    nitroglycerin (NITROSTAT) SL tablet 0 4 mg, 0 4 mg, Sublingual, Q5 Min PRN    oxyCODONE (ROXICODONE) IR tablet 5 mg, 5 mg, Oral, Q6H PRN    senna (SENOKOT) tablet 17 2 mg, 2 tablet, Oral, Daily PRN    sodium chloride 0 9 % infusion, 250 mL/hr, Intravenous, Continuous, 250 mL/hr at 01/21/21 0318    thiamine (VITAMIN B1) tablet 100 mg, 100 mg, Oral, Daily, 100 mg at 01/21/21 0941    Current Outpatient Medications:     hydrOXYzine HCL (ATARAX) 50 mg tablet    mirtazapine (REMERON) 30 mg tablet    Prasterone, DHEA, 50 MG TABS    No Known Allergies        Objective     Blood pressure (!) 153/107, pulse 74, temperature 97 6 °F (36 4 °C), temperature source Oral, resp  rate 18, height 5' 9" (1 753 m), weight (!) 148 kg (326 lb 4 5 oz), SpO2 98 %  Body mass index is 48 18 kg/m²      PHYSICAL EXAM:      General Appearance:   Alert, cooperative, no distress   HEENT:   Normocephalic, atraumatic, anicteric   Neck:  Supple, symmetrical, trachea midline   Lungs:   Clear to auscultation bilaterally; respirations even and unlabored   Heart:   Regular rate and rhythm; +S1/+S2   Abdomen:   Soft, obese, non-tender, non-distended; normal bowel sounds; no masses, no organomegaly    Genitalia:   Deferred    Rectal:   Deferred    Extremities:  +1 B/L pedal edema; no cyanosis    Pulses:  2+ and symmetric    Skin:  No jaundice, rashes, or lesions          Lab Results:   Admission on 01/20/2021   Component Date Value    Ventricular Rate 01/20/2021 85     Atrial Rate 01/20/2021 85     MT Interval 01/20/2021 150     QRSD Interval 01/20/2021 94     QT Interval 01/20/2021 360     QTC Interval 01/20/2021 428     P Axis 01/20/2021 45     QRS Axis 01/20/2021 75     T Wave Axis 01/20/2021 -63     Troponin I 01/20/2021 9 33*    WBC 01/20/2021 9 45     RBC 01/20/2021 5 12     Hemoglobin 01/20/2021 10 1*    Hematocrit 01/20/2021 35 5*    MCV 01/20/2021 69*    MCH 01/20/2021 19 7*    MCHC 01/20/2021 28 5*    RDW 01/20/2021 17 6*    MPV 01/20/2021 10 3     Platelets 25/94/0596 295     nRBC 01/20/2021 0     Neutrophils Relative 01/20/2021 75     Immat GRANS % 01/20/2021 0     Lymphocytes Relative 01/20/2021 15     Monocytes Relative 01/20/2021 9     Eosinophils Relative 01/20/2021 1     Basophils Relative 01/20/2021 0     Neutrophils Absolute 01/20/2021 7 10     Immature Grans Absolute 01/20/2021 0 03     Lymphocytes Absolute 01/20/2021 1 40     Monocytes Absolute 01/20/2021 0 83     Eosinophils Absolute 01/20/2021 0 06     Basophils Absolute 01/20/2021 0 03     Sodium 01/20/2021 141     Potassium 01/20/2021 3 8     Chloride 01/20/2021 102     CO2 01/20/2021 31     ANION GAP 01/20/2021 8     BUN 01/20/2021 23     Creatinine 01/20/2021 1 34*    Glucose 01/20/2021 89     Calcium 01/20/2021 9 0     Corrected Calcium 01/20/2021 9 6     AST 01/20/2021 1,099*    ALT 01/20/2021 3,144*    Alkaline Phosphatase 01/20/2021 70     Total Protein 01/20/2021 7 6     Albumin 01/20/2021 3 3*    Total Bilirubin 01/20/2021 1 01*    eGFR 01/20/2021 65     Total CK 01/20/2021 17,316*    Amph/Meth UR 01/21/2021 Negative     Barbiturate Ur 01/21/2021 Negative     Benzodiazepine Urine 01/21/2021 Positive*    Cocaine Urine 01/21/2021 Negative     Methadone Urine 01/21/2021 Negative     Opiate Urine 01/21/2021 Negative     PCP Ur 01/21/2021 Negative     THC Urine 01/21/2021 Negative     Oxycodone Urine 01/21/2021 Negative     SARS-CoV-2 01/20/2021 Negative     INFLUENZA A PCR 01/20/2021 Negative     INFLUENZA B PCR 01/20/2021 Negative     RSV PCR 01/20/2021 Negative     Lipase 01/21/2021 104     Ethanol Lvl 22/17/1939 <3     Salicylate Lvl 37/36/4110 <3*    Acetaminophen Level 01/21/2021 <2*    PTT 01/21/2021 35     Protime 01/21/2021 16 5*    INR 01/21/2021 1 36*    Color, UA 01/21/2021 Yellow     Clarity, UA 01/21/2021 Clear     pH, UA 01/21/2021 7 0     Leukocytes, UA 01/21/2021 Negative     Nitrite, UA 01/21/2021 Negative     Protein, UA 01/21/2021 Negative     Glucose, UA 01/21/2021 Negative     Ketones, UA 01/21/2021 Negative     Urobilinogen, UA 01/21/2021 1 0  Bilirubin, UA 01/21/2021 Negative     Blood, UA 01/21/2021 Trace*    Specific Gravity, UA 01/21/2021 1 015     RBC, UA 01/21/2021 None Seen     WBC, UA 01/21/2021 0-1*    Epithelial Cells 01/21/2021 Occasional     Bacteria, UA 01/21/2021 None Seen     CK-MB Index 01/20/2021 <1 0     CK-MB 01/20/2021 20 2*    Troponin I 01/21/2021 10 00*    Hepatitis B Surface Ag 01/21/2021 Non-reactive     Hep A IgM 01/21/2021 Non-reactive     Hepatitis C Ab 01/21/2021 Non-reactive     Hep B C IgM 01/21/2021 Non-reactive     Folate 01/21/2021 >20 0*    Troponin I 01/21/2021 7 76*    Cholesterol 01/21/2021 87     Triglycerides 01/21/2021 80     HDL, Direct 01/21/2021 25*    LDL Calculated 01/21/2021 46     Hemoglobin A1C 01/21/2021 5 8*    EAG 01/21/2021 120     Sodium 01/21/2021 142     Potassium 01/21/2021 4 0     Chloride 01/21/2021 105     CO2 01/21/2021 30     ANION GAP 01/21/2021 7     BUN 01/21/2021 17     Creatinine 01/21/2021 1 19     Glucose 01/21/2021 83     Calcium 01/21/2021 8 0*    Corrected Calcium 01/21/2021 8 9     AST 01/21/2021 743*    ALT 01/21/2021 2,552*    Alkaline Phosphatase 01/21/2021 59     Total Protein 01/21/2021 6 7     Albumin 01/21/2021 2 9*    Total Bilirubin 01/21/2021 0 94     eGFR 01/21/2021 75     WBC 01/21/2021 9 77     RBC 01/21/2021 4 62     Hemoglobin 01/21/2021 9 0*    Hematocrit 01/21/2021 32 3*    MCV 01/21/2021 70*    MCH 01/21/2021 19 5*    MCHC 01/21/2021 27 9*    RDW 01/21/2021 17 8*    MPV 01/21/2021 10 2     Platelets 75/07/2841 251     nRBC 01/21/2021 0     Neutrophils Relative 01/21/2021 63     Immat GRANS % 01/21/2021 1     Lymphocytes Relative 01/21/2021 23     Monocytes Relative 01/21/2021 10     Eosinophils Relative 01/21/2021 2     Basophils Relative 01/21/2021 1     Neutrophils Absolute 01/21/2021 6 32     Immature Grans Absolute 01/21/2021 0 05     Lymphocytes Absolute 01/21/2021 2 24     Monocytes Absolute 01/21/2021 0 96     Eosinophils Absolute 01/21/2021 0 15     Basophils Absolute 01/21/2021 0 05     Magnesium 01/21/2021 2 1     Ventricular Rate 01/21/2021 73     Atrial Rate 01/21/2021 73     CO Interval 01/21/2021 148     QRSD Interval 01/21/2021 92     QT Interval 01/21/2021 382     QTC Interval 01/21/2021 420     P Axis 01/21/2021 53     QRS Axis 01/21/2021 53     T Wave Axis 01/21/2021 -62     Ventricular Rate 01/21/2021 70     Atrial Rate 01/21/2021 70     CO Interval 01/21/2021 150     QRSD Interval 01/21/2021 96     QT Interval 01/21/2021 396     QTC Interval 01/21/2021 427     P Axis 01/21/2021 25     QRS Axis 01/21/2021 63     T Wave Axis 01/21/2021 -52     Total CK 01/21/2021 5,776*    PTT 01/21/2021 80*    CK-MB Index 01/21/2021 <1 0     CK-MB 01/21/2021 9 1*         Radiology Results:   Cta Head And Neck W Wo Contrast    Result Date: 1/21/2021  Narrative: CTA NECK AND BRAIN WITH AND WITHOUT CONTRAST INDICATION: Neuro deficit, acute, stroke suspected Stroke COMPARISON:   CT performed 9 hours earlier TECHNIQUE:  Routine CT imaging of the Brain without contrast   Post contrast imaging was performed after administration of iodinated contrast through the neck and brain  Post contrast axial 0 625 mm images timed to opacify the arterial system  3D rendering was performed on an independent workstation  MIP reconstructions performed  Coronal reconstructions were performed of the noncontrast portion of the brain  Radiation dose length product (DLP) for this visit:  1846 mGy-cm   This examination, like all CT scans performed in the [facility], was performed utilizing techniques to minimize radiation dose exposure, including the use of iterative reconstruction and automated exposure control  IV Contrast:  85 mL of iohexol (OMNIPAQUE)  IMAGE QUALITY:   CTA images are degraded by quantum mottle through the level of the shoulders    Both V1 segments and proximal V2 segments poorly depicted  The vessels appear patent  FINDINGS: NONCONTRAST BRAIN PARENCHYMA:  Stable ill-defined hypodensity in the left posterior parietal parenchyma, and a discontiguous focus in the more anterior posterior left frontal parenchyma, both consistent with recent ischemia  No overt hemorrhage  Findings similar in appearance to most recent exam   Minimal local mass effect  VENTRICLES AND EXTRA-AXIAL SPACES:  Normal for the patient's age  VISUALIZED ORBITS AND PARANASAL SINUSES:  Unremarkable  CERVICAL VASCULATURE AORTIC ARCH AND GREAT VESSELS:  Normal aortic arch and great vessel origins  Normal visualized subclavian vessels  RIGHT VERTEBRAL ARTERY CERVICAL SEGMENT:  Normal origin  The vessel is normal in caliber throughout the neck  Segments of the V1 and proximal V2 are degraded by quantum mottle  LEFT VERTEBRAL ARTERY CERVICAL SEGMENT:  Normal origin  The vessel is normal in caliber throughout the neck  Segments of the V1 and proximal V2 are degraded by quantum mottle RIGHT EXTRACRANIAL CAROTID SEGMENT:  Mild atherosclerotic disease of the distal common carotid artery and proximal cervical internal carotid artery without significant stenosis compared to the more distal ICA  LEFT EXTRACRANIAL CAROTID SEGMENT:  Mild atherosclerotic disease of the distal common carotid artery and proximal cervical internal carotid artery without significant stenosis compared to the more distal ICA  NASCET criteria was used to determine the degree of internal carotid artery diameter stenosis  INTRACRANIAL VASCULATURE INTERNAL CAROTID ARTERIES:  Normal enhancement of the intracranial portions of the internal carotid arteries  Normal ophthalmic artery origins  Normal ICA terminus  Bilateral posterior communicating artery are patent ANTERIOR CIRCULATION:  Left A1 is dominant, A1 segments and anterior cerebral arteries with normal enhancement  Normal anterior communicating artery   MIDDLE CEREBRAL ARTERY CIRCULATION:  M1 segment and middle cerebral artery branches demonstrate normal enhancement bilaterally  Subtle underfilling of the distal MCA branches on the left  No focal clot or proximal obstructive disease is noted  DISTAL VERTEBRAL ARTERIES:  Normal distal vertebral arteries  Right AICA/PICA and left Posterior inferior cerebellar artery origins are normal  Normal vertebral basilar junction  BASILAR ARTERY:  Basilar artery is normal in caliber  Normal superior cerebellar arteries  POSTERIOR CEREBRAL ARTERIES: Both posterior cerebral arteries demonstrate normal enhancement  Normal posterior communicating arteries  DURAL VENOUS SINUSES:  Normal  NON VASCULAR ANATOMY BONY STRUCTURES:  No acute osseous abnormality  SOFT TISSUES OF THE NECK:  Tiny hypodense nodule right thyroid, based on size in a supine patient, no further evaluation is warranted  THORACIC INLET:  Unremarkable  Impression: Stable subacute ischemia left MCA distribution, embolic in nature based on discontiguous foci of involvement  No nancy hemorrhage, no change in minimal mass effect  No large vessel flow restrictive disease within the head or neck  Slightly diminished distal left MCA branches  Workstation performed: [id number]     Ct Head Without Contrast    Result Date: 1/21/2021  Narrative: CT BRAIN - WITHOUT CONTRAST INDICATION:   ams  Altered mental status, confusion, decreased sensation on the right, word finding difficulties, patient stated " I think I had a stroke"  Symptoms began approximately 3 days ago  NSTEMI, troponin 9 33  Transaminitis  History provided by Dr Oc Ovalle  COMPARISON:  None available  TECHNIQUE:  CT examination of the brain was performed  In addition to axial images, sagittal and coronal 2D reformatted images were created and submitted for interpretation  Radiation dose length product (DLP) for this visit:  929 mGy-cm     This examination, like all CT scans performed in the [facility], was performed utilizing techniques to minimize radiation dose exposure, including the use of iterative reconstruction and automated exposure control  IMAGE QUALITY:  Diagnostic  FINDINGS: PARENCHYMA:  There is a relatively large area of abnormal hypodensity and loss of gray-white differentiation involving the left posterior frontoparietal region  A smaller area of abnormal hypodensity and loss of gray-white differentiation is also evident more anteriorly in the left frontal lobe, near the convexity  These findings are most suggestive of subacute left MCA territory infarction  There is no evidence of hemorrhagic conversion or acute intracranial hemorrhage  There is no midline shift  VENTRICLES AND EXTRA-AXIAL SPACES:  There is no hydrocephalus  The basilar cisterns are patent  VISUALIZED ORBITS AND PARANASAL SINUSES:  There are retention cysts and mucosal thickening in the right maxillary sinus  There is mild mucosal thickening of the ethmoids  The orbits appear unremarkable  CALVARIUM AND EXTRACRANIAL SOFT TISSUES:  Normal      Impression: Subacute left MCA territory infarction, as described above  No evidence of hemorrhagic conversion  Neurology consultation and follow-up is recommended  I personally discussed this study with DR Brenna Bang on 1/21/2021 at 12:59 AM  Workstation performed: [id number]     Ct Spine Cervical Without Contrast    Result Date: 1/21/2021  Narrative: CT CERVICAL SPINE - WITHOUT CONTRAST INDICATION:   ams fall  COMPARISON:  None available  TECHNIQUE:  CT examination of the cervical spine was performed without intravenous contrast   Contiguous axial images were obtained  Sagittal and coronal reconstructions were performed  Radiation dose length product (DLP) for this visit:  714 mGy-cm     This examination, like all CT scans performed in the [facility], was performed utilizing techniques to minimize radiation dose exposure, including the use of iterative reconstruction and automated exposure control  IMAGE QUALITY:  Diagnostic  FINDINGS: ALIGNMENT:  There is mild gentle reversal of cervical lordosis  There is no subluxation  VERTEBRAL BODIES:  No fracture  DEGENERATIVE CHANGES:  Mild degenerative changes are present  PREVERTEBRAL AND PARASPINAL SOFT TISSUES:  Unremarkable  THORACIC INLET:  Please refer to the concurrent chest, abdomen, and pelvic CT report for description of the thoracic inlet findings  Impression: No cervical spine fracture or traumatic malalignment  There is mild gentle reversal of cervical lordosis  Mild degenerative changes are present  Workstation performed: [id number]     Ct Chest Abdomen Pelvis W Contrast    Result Date: 1/21/2021  Narrative: CT CHEST, ABDOMEN AND PELVIS WITH IV CONTRAST INDICATION:   ams  Subacute stroke, NSTEMI, troponin 9 33  Transaminitis  Possible fall  COMPARISON:  None available  TECHNIQUE: CT examination of the chest, abdomen and pelvis was performed  Axial, sagittal, and coronal 2D reformatted images were created from the source data and submitted for interpretation  Radiation dose length product (DLP) for this visit:  1853 mGy-cm   This examination, like all CT scans performed in the [facility], was performed utilizing techniques to minimize radiation dose exposure, including the use of iterative reconstruction and automated exposure control  IV Contrast:  100 mL of iohexol (OMNIPAQUE) Enteric Contrast: Enteric contrast was not administered  FINDINGS: CHEST LUNGS:  Lungs are clear  There is no tracheal or endobronchial lesion  PLEURA:  Unremarkable  HEART/GREAT VESSELS:  Unremarkable for patient's age  MEDIASTINUM AND BRODERICK:  There is a moderate to large hiatal hernia  CHEST WALL AND LOWER NECK:   Bilateral gynecomastia  ABDOMEN LIVER/BILIARY TREE:  Unremarkable  GALLBLADDER:  No calcified gallstones  No pericholecystic inflammatory change  SPLEEN:  Unremarkable  PANCREAS:  Unremarkable  ADRENAL GLANDS:  Unremarkable  KIDNEYS/URETERS:  Unremarkable  No hydronephrosis  STOMACH AND BOWEL:  There is a moderate to large hiatal hernia  There is no evidence of bowel obstruction  APPENDIX:  A normal appendix was visualized  ABDOMINOPELVIC CAVITY:  No ascites  No pneumoperitoneum  No lymphadenopathy  VESSELS:  Unremarkable for patient's age  PELVIS REPRODUCTIVE ORGANS:  Unremarkable for patient's age  URINARY BLADDER:  Unremarkable  ABDOMINAL WALL/INGUINAL REGIONS:  Tiny fat-containing umbilical hernia  Small fat-containing left inguinal hernia  OSSEOUS STRUCTURES:  No acute fracture or destructive osseous lesion  Impression: No evidence of acute intrathoracic, intra-abdominal or intrapelvic parenchymal organ injury  No pneumothorax  There is a moderate to large hiatal hernia  Bilateral gynecomastia  I personally discussed this study with DR Sriram Zhang on 1/21/2021 at 12:59 AM  Workstation performed: [id number]       The patient was seen and examined by Dr Elizabeth Turner, all jeff medical decisions were made with Dr Elizabeth Turner  Thank you for allowing us to participate in the care of this pleasant patient  We will follow up with you closely

## 2021-01-21 NOTE — ASSESSMENT & PLAN NOTE
· Troponin 9 3, EKG shows T-wave inversions, rate 85   Continue to trend troponin and EKG  · Given ASA bolus, will give Plavix bolus  · Continue heparin drip ACS protocol, monitor PTT  · Add ASA and atorvastatin 40 mg daily  · PRN NTG if chest pain   · Lipid panel and HgA1c   · Echocardiogram ordered  · Continuous telemetry monitoring  · Cardiology consult

## 2021-01-21 NOTE — ASSESSMENT & PLAN NOTE
· AST/ALT 1,099/3144, normal Alk P, T bili 1 01  · CT abdomen shows unremarkable liver and biliary tree, no gallstones  · Coagulopathy INR 1 3  · Will obtain acute hepatitis panel  · Avoid hepatotoxins  · GI consult

## 2021-01-21 NOTE — ASSESSMENT & PLAN NOTE
- stroke pathway ongoing:  - CTH demonstrated subacute left MCA territory infarction involving the left posterior frontal parietal region and left frontal lobe  No evidence of hemorrhagic conversion     - CTA head/neck demonstrated subtle under filling of the distal left MCA branches with no proximal obstructive disease or LVO  Otherwise unremarkable  - MRI brain unable to be performed due to patient's body habitus  CT was done about 3 days post symptoms onset - do not feel it necessary to order followup CT at this time  - ANA performed yesterday, noted moderate-sized PFO with bubble study  - lower extremity Dopplers today with acute left gastrocnemius vein DVT  - had repeat CT head today, stable L MCA infarct size, improved edema, no nancy hemorrhage  - given acute DVT, primary team planning for Lovenox to Coumadin bridge (from neurology standpoint once [facility] is started does not need antiplatelet therapy, can discontinue aspirin and Plavix)  - Atorvastatin 40mg  - thrombosis panel, following results     -hemoglobin A1C of 5 8, lipid panel with LDL of 46  - goal normotension  - Telemetry  - Secondary risk factor modification   - PT/OT/ST  - he will follow closely with interventional cardiology as an outpatient to discuss timing of PFO closure

## 2021-01-21 NOTE — ED PROVIDER NOTES
History  Chief Complaint   Patient presents with    Altered Mental Status     Pt arrives via EMS stating that he has been confused lately  Pt states "I think I've had a stroke " Reports L sided weakness  States he was lying on the floor for 3 days but can't explain why   Cough     Reports dry cough x 4 days      77-year-old male with a past medical history of alcohol abuse, performance enhancing drug use, Percocet use per patient, depression, social anxiety disorder, who presents for confusion  Patient is very poor historian  He appears disheveled  Describes that approximately 3 days ago he believes that he had a fall although he cannot over this clearly  He does not believe he hit his head but is unsure  No blood thinner use per chart  Patient reports that upon awakening today he had a right-sided decreased sensation in his right upper and lower extremities  He reports his last drink was 4 days ago  Does not describe any head or neck pain  Does describe some right-sided abdominal pain  Does report slight blurry vision with only 1 eye open, not with both eyes  Repeatedly states that he is "an idiot and a dolt"  States everything he says does not make sense  No chest pain or shortness of breath  No urinary changes  No nausea or vomiting, no diarrhea constipation  Unsure of last percocet use  Prior to Admission Medications   Prescriptions Last Dose Informant Patient Reported? Taking?    Prasterone, DHEA, 50 MG TABS   Yes No   Sig: Take by mouth   hydrOXYzine HCL (ATARAX) 50 mg tablet   No No   Sig: Take 1 tablet (50 mg total) by mouth 3 (three) times a day as needed for anxiety   mirtazapine (REMERON) 30 mg tablet   No No   Sig: TAKE 1 TABLET (30 MG TOTAL) BY MOUTH DAILY AT BEDTIME      Facility-Administered Medications: None       Past Medical History:   Diagnosis Date    High blood pressure        Past Surgical History:   Procedure Laterality Date    HERNIA REPAIR         History reviewed  No pertinent family history  I have reviewed and agree with the history as documented  E-Cigarette/Vaping     E-Cigarette/Vaping Substances     Social History     Tobacco Use    Smoking status: Unknown If Ever Smoked    Smokeless tobacco: Current User     Types: Chew   Substance Use Topics    Alcohol use: Yes     Comment: states "Im trying to quit"     Drug use: Yes     Comment: percocets         Review of Systems   Constitutional: Negative for chills, diaphoresis, fatigue and fever  HENT: Negative for congestion and sore throat  Eyes: Negative for visual disturbance  Respiratory: Negative for cough, chest tightness and shortness of breath  Cardiovascular: Negative for chest pain, palpitations and leg swelling  Gastrointestinal: Positive for abdominal pain  Negative for abdominal distention, constipation, diarrhea, nausea and vomiting  Genitourinary: Negative for difficulty urinating and dysuria  Musculoskeletal: Negative for arthralgias and myalgias  Skin: Negative for rash  Neurological: Positive for speech difficulty and numbness  Negative for dizziness, light-headedness and headaches  Psychiatric/Behavioral: Positive for confusion  Negative for agitation and behavioral problems  The patient is not nervous/anxious  All other systems reviewed and are negative        Physical Exam  ED Triage Vitals   Temperature Pulse Respirations Blood Pressure SpO2   01/20/21 2258 01/20/21 2256 01/20/21 2256 01/20/21 2256 01/20/21 2256   98 8 °F (37 1 °C) 85 20 (S) (!) 183/90 98 %      Temp Source Heart Rate Source Patient Position - Orthostatic VS BP Location FiO2 (%)   01/20/21 2258 01/20/21 2256 01/20/21 2256 01/20/21 2256 --   Oral Monitor Lying Right arm       Pain Score       --                    Orthostatic Vital Signs  Vitals:    01/20/21 2256 01/21/21 0107   BP: (S) (!) 183/90 (!) 171/88   Pulse: 85 81   Patient Position - Orthostatic VS: Lying Lying       Physical Exam  Constitutional:       Appearance: He is well-developed  HENT:      Head: Normocephalic and atraumatic  Eyes:      Extraocular Movements: Extraocular movements intact  Neck:      Musculoskeletal: Normal range of motion and neck supple  Cardiovascular:      Rate and Rhythm: Normal rate and regular rhythm  Heart sounds: Normal heart sounds  No murmur  Pulmonary:      Effort: Pulmonary effort is normal  No respiratory distress  Breath sounds: Normal breath sounds  Abdominal:      General: Bowel sounds are normal  There is no distension  Palpations: Abdomen is soft  Tenderness: There is no abdominal tenderness  Musculoskeletal:         General: No deformity  Skin:     General: Skin is warm  Findings: No rash  Comments: Scrapes noted on bilateral lower extremities  Patient was exposed and did not have any evidence of infection on skin  No sign of infection on genital exam    Neurological:      Mental Status: He is alert and oriented to person, place, and time  Comments: Patient was able to ambulate in the room  Was able to move all extremities and had 5/5 strength throughout them  Patient reported normal sensation during physical exam, despite history  Patient expressed evidence of hemineglect on exam when attempting cerebellar function tests  Psychiatric:         Thought Content:  Thought content normal          Judgment: Judgment normal       Comments: Patient is confused and expressing word-finding difficulties on exam          ED Medications  Medications   sodium chloride 0 9 % infusion (100 mL/hr Intravenous New Bag 1/21/21 0052)   heparin (porcine) injection 4,000 Units (has no administration in time range)   heparin (porcine) 25,000 units in 0 45% NaCl 250 mL infusion (premix) (has no administration in time range)   heparin (porcine) injection 4,000 Units (has no administration in time range)   heparin (porcine) injection 2,000 Units (has no administration in time range)   tetanus-diphtheria-acellular pertussis (BOOSTRIX) IM injection 0 5 mL (0 5 mL Intramuscular Given 1/20/21 2331)   multi-electrolyte (ISOLYTE-S PH 7 4) bolus 2,000 mL (0 mL Intravenous Stopped 1/21/21 0030)   thiamine (VITAMIN B1) 500 mg in sodium chloride 0 9 % 50 mL IVPB (0 mg Intravenous Stopped 6/62/73 4311)   folic acid 1 mg in sodium chloride 0 9 % 50 mL IVPB (0 mg Intravenous Stopped 1/21/21 0122)   iohexol (OMNIPAQUE) 350 MG/ML injection (MULTI-DOSE) 100 mL (100 mL Intravenous Given 1/21/21 0046)   aspirin tablet 325 mg (325 mg Oral Given 1/21/21 0120)       Diagnostic Studies  Results Reviewed     Procedure Component Value Units Date/Time    Urine Microscopic [[id number]] Collected: 01/21/21 0145    Lab Status: In process Specimen: Urine, Clean Catch Updated: 01/21/21 0212    Rapid drug screen, urine [[id number]] Collected: 01/21/21 0107    Lab Status:  In process Specimen: Urine, Clean Catch Updated: 01/21/21 0212    CKMB [[id number]]  (Abnormal) Collected: 01/20/21 2332    Lab Status: Final result Specimen: Blood from Arm, Left Updated: 01/21/21 0203     CK-MB Index <1 0 %      CK-MB 20 2 ng/mL     CK (with reflex to MB) [[id number]]  (Abnormal) Collected: 01/20/21 2332    Lab Status: Final result Specimen: Blood from Arm, Left Updated: 01/21/21 0203     Total CK 17,316 U/L     POCT urinalysis dipstick [[id number]]  (Abnormal) Resulted: 01/21/21 0149    Lab Status: Final result Updated: 01/21/21 0149    Urine Macroscopic, POC [[id number]]  (Abnormal) Collected: 01/21/21 0145    Lab Status: Final result Specimen: Urine Updated: 01/21/21 0147     Color, UA Yellow     Clarity, UA Clear     pH, UA 7 0     Leukocytes, UA Negative     Nitrite, UA Negative     Protein, UA Negative mg/dl      Glucose, UA Negative mg/dl      Ketones, UA Negative mg/dl      Urobilinogen, UA 1 0 E U /dl      Bilirubin, UA Negative     Blood, UA Trace     Specific [place], UA 1 015    Narrative:      CLINITEK RESULT    APTT six (6) hours after Heparin bolus/drip initiation or dosing change [[id number]]  (Normal) Collected: 01/21/21 0104    Lab Status: Final result Specimen: Blood from Arm, Left Updated: 01/21/21 0127     PTT 35 seconds     Protime-INR [[id number]]  (Abnormal) Collected: 01/21/21 0104    Lab Status: Final result Specimen: Blood from Arm, Left Updated: 01/21/21 0127     Protime 16 5 seconds      INR 1 36    Lipase [[id number]]  (Normal) Collected: 01/21/21 0045    Lab Status: Final result Specimen: Blood from Arm, Left Updated: 01/21/21 0121     Lipase 805 u/L     Salicylate level [[id number]]  (Abnormal) Collected: 01/21/21 0045    Lab Status: Final result Specimen: Blood from Arm, Left Updated: 33/33/54 9574     Salicylate Lvl <3 mg/dL     Acetaminophen level-If concentration is detectable, please discuss with medical  on call  [[id number]]  (Abnormal) Collected: 01/21/21 0045    Lab Status: Final result Specimen: Blood from Arm, Left Updated: 01/21/21 0121     Acetaminophen Level <2 ug/mL     Ethanol [[id number]]  (Normal) Collected: 01/21/21 0045    Lab Status: Final result Specimen: Blood from Arm, Left Updated: 01/21/21 0120     Ethanol Lvl <3 mg/dL     COVID[facility], Influ[facility]/B, [facility] [[id number]]  (Normal) Collected: 01/20/21 2354    Lab Status: Final result Specimen: Nares from Nasopharyngeal Swab Updated: 01/21/21 0037     SARS-CoV-2 Negative     INFLUENZA A PCR Negative     INFLUENZA B PCR Negative     RSV PCR Negative    Narrative: This test has been authorized by FDA under an EUA (Emergency Use Assay) for use by authorized laboratories  Clinical caution and judgement should be used with the interpretation of these results with consideration of the clinical impression and other laboratory testing  Testing reported as "Positive" or "Negative" has been proven to be accurate according to standard laboratory validation requirements    All testing is performed with control materials showing appropriate reactivity at standard intervals      Comprehensive metabolic panel [[id number]]  (Abnormal) Collected: 01/20/21 2332    Lab Status: Final result Specimen: Blood from Arm, Left Updated: 01/21/21 0023     Sodium 141 mmol/L      Potassium 3 8 mmol/L      Chloride 102 mmol/L      CO2 31 mmol/L      ANION GAP 8 mmol/L      BUN 23 mg/dL      Creatinine 1 34 mg/dL      Glucose 89 mg/dL      Calcium 9 0 mg/dL      Corrected Calcium 9 6 mg/dL      AST 1,099 U/L      ALT 3,144 U/L      Alkaline Phosphatase 70 U/L      Total Protein 7 6 g/dL      Albumin 3 3 g/dL      Total Bilirubin 1 01 mg/dL      eGFR 65 ml/min/1 73sq m     Narrative:      National Kidney Disease Foundation guidelines for Chronic Kidney Disease (CKD):     Stage 1 with normal or high GFR (GFR > 90 mL/min/1 73 square meters)    Stage 2 Mild CKD (GFR = 60-89 mL/min/1 73 square meters)    Stage 3A Moderate CKD (GFR = 45-59 mL/min/1 73 square meters)    Stage 3B Moderate CKD (GFR = 30-44 mL/min/1 73 square meters)    Stage 4 Severe CKD (GFR = 15-29 mL/min/1 73 square meters)    Stage 5 End Stage CKD (GFR <15 mL/min/1 73 square meters)  Note: GFR calculation is accurate only with a steady state creatinine    Troponin I [[id number]]  (Abnormal) Collected: 01/20/21 2332    Lab Status: Final result Specimen: Blood from Arm, Left Updated: 01/21/21 0014     Troponin I 9 33 ng/mL     CBC and differential [[id number]]  (Abnormal) Collected: 01/20/21 2332    Lab Status: Final result Specimen: Blood from Arm, Left Updated: 01/20/21 2338     WBC 9 45 Thousand/uL      RBC 5 12 Million/uL      Hemoglobin 10 1 g/dL      Hematocrit 35 5 %      MCV 69 fL      MCH 19 7 pg      MCHC 28 5 g/dL      RDW 17 6 %      MPV 10 3 fL      Platelets 232 Thousands/uL      nRBC 0 /100 WBCs      Neutrophils Relative 75 %      Immat GRANS % 0 %      Lymphocytes Relative 15 %      Monocytes Relative 9 %      Eosinophils Relative 1 %      Basophils Relative 0 % Neutrophils Absolute 7 10 Thousands/µL      Immature Grans Absolute 0 03 Thousand/uL      Lymphocytes Absolute 1 40 Thousands/µL      Monocytes Absolute 0 83 Thousand/µL      Eosinophils Absolute 0 06 Thousand/µL      Basophils Absolute 0 03 Thousands/µL                  CT head without contrast   Final Result by Nikki Castanon MD (01/21 0117)      Subacute left MCA territory infarction, as described above  No evidence of hemorrhagic conversion  Neurology consultation and follow-up is recommended  I personally discussed this study with DR Brenna Bang on 1/21/2021 at 12:59 AM                      Workstation performed: [id number]         CT chest abdomen pelvis w contrast   Final Result by Nikki Castanon MD (01/21 0141)      No evidence of acute intrathoracic, intra-abdominal or intrapelvic parenchymal organ injury  No pneumothorax  There is a moderate to large hiatal hernia  Bilateral gynecomastia  I personally discussed this study with DR Brenna Bang on 1/21/2021 at 12:59 AM                Workstation performed: [id number]         CT spine cervical without contrast   Final Result by Nikki Castanon MD (01/21 0125)      No cervical spine fracture or traumatic malalignment  There is mild gentle reversal of cervical lordosis  Mild degenerative changes are present                     Workstation performed: [id number]               Procedures  ECG 12 Lead Documentation Only    Date/Time: 1/21/2021 2:16 AM  Performed by: Garrison Carpenter MD  Authorized by: Garrison Carpenter MD     Indications / Diagnosis:  Ams  ECG reviewed by me, the ED Provider: yes    Patient location:  ED  Previous ECG:     Previous ECG:  Compared to current    Similarity:  Changes noted  Interpretation:     Interpretation: abnormal    Rate:     ECG rate:  85    ECG rate assessment: normal    Rhythm:     Rhythm: sinus rhythm    Ectopy:     Ectopy: none    QRS:     QRS axis: Normal  Conduction:     Conduction: normal    ST segments:     ST segments:  Normal  T waves:     T waves: inverted      Inverted:  V5, V6, III and aVF          ED Course  ED Course as of Jan 21 0221   Wed Jan 20, 2021   2339 Hemoglobin(!): 10 1   Thu Jan 21, 2021   0014 Troponin I(!): 9 33   0015 Troponin of 9 33 observed  Will hold off on aspirin/heparin until CT scan given suspicious fall history and concern for ICH       0028 AST(!): 1,099   0029 ALT(!): 3,144   0128 INR(!): 1 36   0128 Protime(!): 16 5   0205 Total CK(!): 17,316                                       MDM  Number of Diagnoses or Management Options  Acute ischemic left MCA stroke [facility]):   REANNA (acute kidney injury) ([facility] ): Altered mental status:   Elevated LFTs:   Elevated troponin:   Rhabdomyolysis:   Diagnosis management comments: Patient has broad differential given that he is a poor historian at this time  Broad workup including pan scan, CBC, CMP, UDS, UA, troponin, EKG, and other labs ordered  Workup demonstrated elevated troponin over 9  This was accompanied by T-wave inversions in 3 and AVF, and V5 V6  Heparin and aspirin were started after patient received CT scan given concern for intracranial hemorrhage  CT of the head demonstrated subacute infarct of the left MCA distribution  Patient was also found to have elevated CK of 17,000  LFTs were 1100/3000  Patient was started on heparin and aspirin, started on 2 L of fluids, tetanus vaccine updated, as well as thiamine and folic acid given  Patient was admitted to slim  Disposition  Final diagnoses:    Altered mental status   Rhabdomyolysis   REANNA (acute kidney injury) ([facility] )   Elevated troponin   Acute ischemic left MCA stroke (HCC)   Elevated LFTs     Time reflects when diagnosis was documented in both MDM as applicable and the Disposition within this note     Time User Action Codes Description Comment    1/21/2021  1:39 AM Fatuma Hughes Add [R46 33] Altered mental status 1/21/2021  1:40 AM Ledora Gauss Add [M62 82] Rhabdomyolysis     1/21/2021  1:40 AM Tab Hughes Brain Add [N17 9] REANNA (acute kidney injury) ([facility] )     1/21/2021  1:41 AM Ledora Gauss Add [R77 8] Elevated troponin     1/21/2021  1:41 AM Ledora Gauss Add [I63 512] Acute ischemic left MCA stroke ([facility] )     1/21/2021  1:43 AM Ledora Gauss Add [R79 89] Elevated LFTs       ED Disposition     ED Disposition Condition Date/Time Comment    Admit Stable Thu Jan 21, 2021  1:39 AM Case was discussed with CONCHITA and the patient's admission status was agreed to be Admission Status: inpatient status to the service of Dr My Seay   Follow-up Information    None         Patient's Medications   Discharge Prescriptions    No medications on file     No discharge procedures on file  PDMP Review       Value Time User    PDMP Reviewed  Yes 4/20/2020  2:32 PM Kimberly Hollis MD           ED Provider  Attending physically available and evaluated Jovita Juarez  MARIA A managed the patient along with the ED Attending      Electronically Signed by         Jayant Pisano MD  01/21/21 0076

## 2021-01-21 NOTE — ED ATTENDING ATTESTATION
1/20/2021  IMic DO, saw and evaluated the patient  I have discussed the patient with the resident/non-physician practitioner and agree with the resident's/non-physician practitioner's findings, Plan of Care, and MDM as documented in the resident's/non-physician practitioner's note, except where noted  All available labs and Radiology studies were reviewed  I was present for key portions of any procedure(s) performed by the resident/non-physician practitioner and I was immediately available to provide assistance  At this point I agree with the current assessment done in the Emergency Department  I have conducted an independent evaluation of this patient a history and physical is as follows:    ED Course     Pt seen and examined  42 yo male with some known psychiatric hx, drinks alcohol regularly, uses performance enhancing steroids and admits to taking percocets recreationally presenting with confusion and R sided decreased sensation  Very difficult to get story from pt as he is very confused  Pt thinks he had fall 3 days ago and laid on ground since that time, but unable to give me any more details  States he feels "like an idiot and a dolt"  Reports slight headache, dry cough  Denies fever, chills, body aches  Will check cardiac labs, CK, urine, COVID, EKG and check CT head, c/a/p  Will give 2 L IVF and reassess  Will need admission  EKG - NSR 85, T wave inversions inferiolaterally - trop pending  0020 - H/H 10 1/35 5, trop 9 33 - will need heparin gtt but will need to hold off until scans performed  Awaiting CT scans until creat returns  0026 - Creat 1 34, AST/ALT 1099/3144, t bili 1 01 - IVF infusing  Pt being taken directly to CT scanner  0045 - COVID neg  Lipase being drawn as it wasn't able to be added       0110 - CT head - There is a relatively large area of abnormal hypodensity and loss of gray-white differentiation involving the left posterior frontoparietal region  A smaller area of abnormal hypodensity and loss of gray-white differentiation is also evident more anteriorly in the left frontal lobe, near the convexity  These findings are most suggestive of subacute left MCA territory infarction  There is no evidence of hemorrhagic conversion or acute intracranial hemorrhage  There is no midline Shift  Dr Monik Zuñiga quickly looked at CT c/a/p and does not see anything acute, will review in detail and report  SLIM to admit  Lipase WNL  CK elevated per lab - rerunning test     0136 - CT csp - No cervical spine fracture or traumatic malalignment  There is mild gentle reversal of cervical lordosis  Mild degenerative changes are present  Final diagnoses: Altered mental status   Rhabdomyolysis   REANNA (acute kidney injury) ([facility] )   Elevated troponin   Acute ischemic left MCA stroke (HCC)   Elevated LFTs         Critical Care Time  CriticalCare Time  Performed by: Catherine Rodriguez DO  Authorized by: Catherine Rodriguez DO     Critical care provider statement:     Critical care time (minutes):  60    Critical care time was exclusive of:  Separately billable procedures and treating other patients and teaching time    Critical care was necessary to treat or prevent imminent or life-threatening deterioration of the following conditions: NSTEMI, altered MS      Critical care was time spent personally by me on the following activities:  Blood draw for specimens, obtaining history from patient or surrogate, development of treatment plan with patient or surrogate, discussions with consultants, evaluation of patient's response to treatment, examination of patient, interpretation of cardiac output measurements, ordering and performing treatments and interventions, ordering and review of laboratory studies, ordering and review of radiographic studies, re-evaluation of patient's condition and review of old charts    I assumed direction of critical care for this patient from another provider in my specialty: no

## 2021-01-21 NOTE — ASSESSMENT & PLAN NOTE
· Followed outpatient by Psychiatry, maintained on p r n   Hydroxyzine 50mg and mirtazapine 30 mg at bedtime

## 2021-01-21 NOTE — SPEECH THERAPY NOTE
Speech Language/Pathology  Speech/Language Pathology  Assessment    Patient Name: Kendra Tobias  Today's Date: 1/21/2021     Problem List  Principal Problem:    Acute CVA (cerebrovascular accident) [facility])  Active Problems:    MDD (major depressive disorder), recurrent episode, moderate ([facility] [address] )    NSTEMI (non-ST elevated myocardial infarction) ([facility] [address] )    Alcohol abuse    Drug abuse ([address] )    Acute liver failure without hepatic coma    Macrocytic anemia    Elevated serum creatinine    Hypertensive urgency    Rhabdomyolysis    Super obese    Past Medical History  Past Medical History:   Diagnosis Date    High blood pressure      Past Surgical History  Past Surgical History:   Procedure Laterality Date    HERNIA REPAIR         Speech-Language Evaluation    Impression:  With limited evaluation (tech in to do echo)  Pt  presents w/ mild expressive aphasia characterized by word finding difficulties and difficulty w/ formulation in conversation  Picture naming skills were 19/20  He had difficulty w/ divergent naming, and basic math  Basic receptive language skills appeared adequate  Pt was able to establish yes/no reliability & demonstrated adequate reading comprehension at the sentence level  C/o blurred vision in both eyes and slowed mental status  No evidence of facial droop or asymmetry  Has been reportedly tolerating a regular diet  Recommendation:  Suspect pt will need ongoing ST  Will f/u here for additional diagnostic tx  Speech therapy at next level of care     Therapy Prognosis: Fair  Prognosis considerations: current medical status and lifestyle  Frequency: 2-5x/week    Patient's goal:  "I'm worried about my dog"  Goals: 1  Pt will utilize compensatory strategies (I e , semantic webbing, list attributes, etc ) to arrive at desired word during conversation with 90% accuracy and min cues     2 Pt will provide 5 attributes for a given word/object with 90% accuracy w/ min cues to improve communication across partners  3 Pt will improve basic math skills----> functional math skills to at least 80% w/ min cues  4  Additional receptive assessment w/ goals to follow  Pt is a 40 yo male admitted w/ acute CVA (L MCA infarction)    Social:  Lives alone  Orientation:  Place: +  Hospital: +  Reason for hospitalization: +  Auditory Comprehension:  R/L discrim: Pt was able to point to R eye but stated "he get's them confused"  Body part ID: 3/3  One step commands: 3/3  Complex y/n ?'s:    Reading Comprehension:  Functional comprehension:   This was tasking for the pt related to blurred vision  Oral Expression:  Picture Namin/20  Divergent: named 8 words beginning w/ "B" in 1 minute  Math:  Addition:  [id number]:  Multiplication:   Pt stated that he is unable to remember the passcode to his phone  Motor Speech:  Dysarthria: -   Apraxia: -  Cognitive -linguistic skills:  Impaired  Needs further evaluation? Also noted:  Distractable  Easily frustrated w/ cognitive tasks  Inattention  Aware/partially aware of deficits  Results discussed with:  Pt, nursing    Eval completed by ST Jovanny student, and Josie Bojorquez MS, CCC/SLP     Per H&P  Chief Complaint:   Left hand pain, numbness and tingling  History of Present Illness:  Faby Galeano is a 41 y o  male with PMH HTN, anxiety, depression, alcohol and drug abuse who presents with c/o left hand and left leg pain, numbness, tingling and weakness  Reports associated confusion and word-finding difficulty  States 3 days ago he drank alcohol, took 2 or 3 tablets of Percocet and anabolic steroids, fell asleep and woke up the next day on the floor  States he had a difficult time getting into bed  Eventually he was able to go back into bed and has been resting in bed for 3 days, has not eaten or drank any fluids  Confusion and weakness persisted, he had difficulty ambulating    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

## 2021-01-21 NOTE — CONSULTS
Consultation - Neurology   Yamil Palomino 41 y o  male MRN: [id number]  Unit/Bed#: ED 16 Encounter: [id number]      Assessment/Plan    Yamil Palomino is a 41 y o  male with HTN, chronic daily alcohol use, drug use including opiods, benzos and anabolic steroids, depression and anxiety who presented to the ED with confusion, word-finding difficulties, visual disturbance and mild R sided weakness and numbness  CTH confirmed L MCA infarct  Additional work-up revealed NSTEMI with elevated troponin of 10, now currently on Heparin; [place] [address], as well as acute liver failure with AST/ALT of 1099/3144  Workup to determine origin of stroke underway, though anabolic steroids are known to produce a atherthrombotic pheomena, as they increase vascular tone, arterial tensions and platelet aggregation; which could very well be responsible for this insult  Other potential etiologies include cardioemobolic etiology in the setting of recent cardiac ischemia or potential arrhythmia (especially in the setting of his chronic alcohol use) vs underlying hypercoagulable state vs small vessel disease with patient's uncontrolled HTN also as a significant risk factor  Will await vessel imaging as well as Echo  Pending Echo results will determine if venous duplex is need as well  * Acute CVA (cerebrovascular accident) [facility])  [address] [facility] [address] 3-4 days ago and couldn't get up   Woke up in his own urine    - Has felt confused and has had difficulty with his speech   - Can't remember passwords  - Mild R sided weakness and decreased sensation  - Daily ETOH use with transaminitis  - Opiod and Benzo use  - Cycles with anabolic steroids,reported to be taken last week    - Acute ischemic stroke protocol  - Not a candidate for tPA due to prolonged time since onset  - s/p Aspirin 325mg load, Plavix 600mg load  - Aspirin 81mg, Plavix 75mg  - Atorvastatin 40mg  - A1C pending  - /90 on arrival, now up to 194/93   - MRI brain wo contrast pending  - Echo pending  - Telemetry  - Secondary risk factor modification  - Goal of normotension and euglycemia   - PT/OT/ST  - Stroke Education  - Frequent neurological checks  - Stat CT head with acute worsening in neuro exam/mental status  - Notify neurology with any changes in examination  Results:  - CTH:  · Subacute left MCA territory infarction, as described above  No evidence of hemorrhagic conversion  Neurology consultation and follow-up is recommended  - CT A/P w contrast:  · No evidence of acute intrathoracic, intra-abdominal or intrapelvic parenchymal organ injury  No pneumothorax  · There is a moderate to large hiatal hernia  · Bilateral gynecomastia  - CT C spine:  · No cervical spine fracture or traumatic malalignment  There is mild gentle reversal of cervical lordosis    Mild degenerative changes are present   - Troponin 9 33, 10, 7 76  - AST/ALT 1099/3144  - Total CK 89345  - INR 1 36  - LDL 46  - COVID/influenza/RSV negative  - UA negative  - UDS positive for Benzos    Drug abuse ([facility] )  Assessment & Plan  - Cycling with anabolic steroids  - Intermittent opiod and benzo use  - UDS positive for benzos  - Gynecomastia noted on CT    Hypertensive urgency  Assessment & Plan  - Goal of normotension as stroke onset several days ago  - Patient is not on an antihypertensive at baseline    - Defer antihypertensive regimen to primary team    NSTEMI (non-ST elevated myocardial infarction) ([facility] [address] )  Assessment & Plan  - Troponin 9 33-->10-->7 76  - Echo pending  - Heparin gtt  - Cardiology on board    Alcohol abuse  Assessment & Plan  - Daily alcohol use  - "At least 40 oz of beer or a 12 pack of beer plus liquor"  - States he relpased a few months ago with longest duration of sobriety being 5 months  - Labs consistent with liver failure, likely in the setting of chronic alcohol use  - [facility] protocol  - Thiamine, Folate    Acute liver failure without hepatic coma  Assessment & Plan  - Chronic daily alcohol use  - AST/ALT 1099/3144, Tbili 1 01  - Management as per GI/Primary team    Rhabdomyolysis  Assessment & Plan  - Reports of collapsing to the ground and laying on the floor for 3 days  - total CK 47201  - IVF  - Management as per primary team      Pipe Hassan will need follow up in in 4 weeks with neurovascular attending or advance practitioner  He will not require outpatient neurological testing  History of Present Illness     Reason for Consult / Principal Problem: stroke  Hx and PE limited by: Confusion  HPI: Pipe Hassan is a 41 y o  right handed male with HTN, chronic daily alcohol use, drug use including opiods, benzos and cycling with anabolic steroids, depression and anxiety who presented to the ED with confusion and word-finding difficulties  Patient remains confused on my exam  He reports chronic daily alcohol use drinking "at least 40oz of beer or a 12 pack of beer plus liquor" every day, frequent percocet or oxycodone use, occasional benzo use and frequent anabolic steroids (the later of which he reported taking last week)  He states his last drink was prior to this episode  He reports that about 3-4 days ago he collapsed and couldn't get up  He reports laying on the ground during that time and waking up in his urine  He notes there is a girl that sometimes lives with him but stated that she is "an addict and is flaky " He states that he finally was able to get himself off the ground when he noted confusion stating that "it feels like I'm dumb or had a concussion " He reports that he [employer] and he hasn't been able to remember any of his passwords  In addition to the confusion he acknowledges word finding difficulty, split or cloudy vision (which he is unable to elaborate on) and Mild R sided weakness and decreased sensation  He denies HA, CP, SOB, fever, chills  /90 on arrival, now up to 194/93    Creatinine 1 34, AST/ALT 1099/3144, Tbili 1 01, Troponin 9 33-->10-->7 76, total CK 19883, INR 1 36  COVID/influenza/RSV negative  UA negative  UDS positive for Benzos  CTH reveals subacute L MCA infarct  Currently patient continues to be confused as he is only oriented to person and place, though can chose correct month when given choice of two  He continues to have word finding issues  He complains about being extremely hungry and having hunger pains  Inpatient consult to Neurology  Consult performed by: Artie Davila PA-C  Consult ordered by: CHELSI Saini        Review of Systems   Constitutional: Positive for fatigue  HENT: Negative for trouble swallowing  Eyes:        Split vision, cloudy vision   Respiratory: Negative for chest tightness and shortness of breath  Cardiovascular: Negative for chest pain  Gastrointestinal:        Hunger pains   Genitourinary: Negative  Musculoskeletal: Positive for myalgias (generalized)  Neurological: Positive for speech difficulty, weakness (R sided) and numbness (Minimal RUE)  Negative for headaches  Psychiatric/Behavioral: Positive for agitation ("because I'm hungry")  Historical Information   Past Medical History:   Diagnosis Date    High blood pressure      Past Surgical History:   Procedure Laterality Date    HERNIA REPAIR       Social History   Social History     Substance and Sexual Activity   Alcohol Use Yes    Comment: states "Im trying to quit"      Social History     Substance and Sexual Activity   Drug Use Yes    Comment: percocets      E-Cigarette/Vaping     E-Cigarette/Vaping Substances     Social History     Tobacco Use   Smoking Status Unknown If Ever Smoked   Smokeless Tobacco Current User    Types: Chew     Family History: History reviewed  No pertinent family history  Review of previous medical records was completed       Meds/Allergies   all current active meds have been reviewed    No Known Allergies    Objective   Vitals:Blood pressure (!) 194/93, pulse 91, temperature (!) 97 4 °F (36 3 °C), resp  rate 18, height 5' 9" (1 753 m), weight (!) 148 kg (326 lb 4 5 oz), SpO2 99 %  ,Body mass index is 48 18 kg/m²  Intake/Output Summary (Last 24 hours) at 1/21/2021 0852  Last data filed at 1/21/2021 0122  Gross per 24 hour   Intake 2550 ml   Output    Net 2550 ml       Invasive Devices: Invasive Devices     Peripheral Intravenous Line            Peripheral IV 01/20/21 Left Antecubital less than 1 day    Peripheral IV 01/20/21 Left Hand less than 1 day                Physical Exam  Constitutional:       General: He is not in acute distress  Appearance: Normal appearance  He is well-developed  He is obese  He is not ill-appearing or diaphoretic  Comments: Confused appearing, easily frustrated   HENT:      Head: Normocephalic and atraumatic  Eyes:      General: No scleral icterus  Right eye: No discharge  Left eye: No discharge  Extraocular Movements: Extraocular movements intact and EOM normal       Conjunctiva/sclera: Conjunctivae normal       Pupils: Pupils are equal, round, and reactive to light  Neck:      Musculoskeletal: Normal range of motion and neck supple  Cardiovascular:      Rate and Rhythm: Normal rate and regular rhythm  Pulmonary:      Effort: Pulmonary effort is normal  No respiratory distress  Breath sounds: No stridor  Musculoskeletal: Normal range of motion  General: No tenderness or deformity  Lymphadenopathy:      Cervical: No cervical adenopathy  Skin:     General: Skin is warm and dry  Findings: Bruising (R abdomen) and lesion (R dorsum of foot) present  No erythema or rash  Neurological:      Mental Status: He is alert  Coordination: Finger-Nose-Finger Test normal    Psychiatric:         Speech: Speech normal       Comments: frustrated       Neurologic Exam     Mental Status   Speech: speech is normal   Level of consciousness: alert  It to person, [facility]  Incorrectly states day of the week  Can correctly state month but only when given 2 choices  Incorrectly states that it is 2020  Does not know the actual date  A majority of the time is able to follow one-step directions though often needs repetition  Does have right left confusion  Is unable to follow 2 step directions  Decreased attention and concentration  No dysarthria  Accurate with naming however required prolonged time when asked to name ink  Is able to read stroke cards  With cuing is able to identify areas of problem in stroke card picture however does demonstrate word substitution/confusion ie "house wife is spilling over sink " With repeated questioning is eventually able to say water was spilling over  Cranial Nerves     CN II   Visual fields full to confrontation  Visual acuity: (grossly)  Right visual field deficit: none  Left visual field deficit: none     CN III, IV, VI   Pupils are equal, round, and reactive to light  Extraocular motions are normal    Nystagmus: none   Conjugate gaze: present    CN V   Facial sensation intact  CN VII   Facial expression full, symmetric  CN VIII   Hearing: intact (grossly)    CN XI   CN XI normal      CN XII   CN XII normal      Motor Exam   Muscle bulk: normal  Overall muscle tone: normal  Right arm pronator drift: absent  Left arm pronator drift: absent    Strength   Strength 5/5 except as noted  RLE less solid and more effort required for patient to demonstrate full strength with plantar flexion  Subtle R sided hemineglect noted, as patient delayed to use R side or demonstrates a preference to demonstrate command with L side before R  Sensory Exam   Light touch, intact with the exception of L hand described as 70%  Sensation intact to temperature and vibration intact        Gait, Coordination, and Reflexes     Coordination   Finger to nose coordination: normal    Tremor   Resting tremor: absent    Reflexes   Right plantar: normal  Left plantar: normal  2+ reflexes throughout except RLE trace to 1       Lab Results: I have personally reviewed pertinent reports  Imaging Studies: I have personally reviewed pertinent films in PACS  EKG, Pathology, and Other Studies: I have personally reviewed pertinent reports

## 2021-01-21 NOTE — PLAN OF CARE
Problem: OCCUPATIONAL THERAPY ADULT  Goal: Performs self-care activities at highest level of function for planned discharge setting  See evaluation for individualized goals  Description: Treatment Interventions: ADL retraining, UE strengthening/ROM, Functional transfer training, Endurance training, Patient/family training, Equipment evaluation/education, Compensatory technique education, Continued evaluation, Activityengagement          See flowsheet documentation for full assessment, interventions and recommendations  Note: Limitation: Decreased ADL status, Decreased UE strength, Decreased Safe judgement during ADL, Decreased cognition, Decreased endurance, Decreased sensation, Decreased self-care trans, Decreased high-level ADLs  Prognosis: Fair  Assessment: Pt is a 41 y o  male seen for OT evaluation s/p adm to [address] on 1/20/2021 w/ altered mental status and L-sided weakness, numbness, and tingling  Pt reports he was lying on the floor for 3 days  Pt admitted on stroke pathway and dx'd w/ Acute CVA, NSTEMI, Acute liver failure without hepatic coma, and rhabdomyolysis  CT head demonstrated subacute L MCA territory infarction  CTA head/neck- stable subacute ischemia L MCA distribution, embolic in nature  MRI brain pending  Comorbidities affecting pts functional performance include a significant PMH of Anxiety, Depression, HTN, and alcohol and drug abuse  Pt with active OT orders  Pt lives alone (unreliable roommate) in a one level apt with 10 [place]  Pt reports limited local support  At baseline, pt was I w/ ADLs, IADLs, and functional mobility/transfers w/o use of AD, self employed, (-) , and denies falls PTA (however reports on floor x3 days)   Upon evaluation, pt currently requires Supervision for UB ADLs, Min A for LB ADLs, Min A for toileting, Supervision for bed mobility, and Supervision for functional mobility/transfers 2* the following deficits impacting occupational performance: weakness, decreased strength, decreased balance, decreased tolerance, impaired memory, impaired problem solving and decreased safety awareness  These impairments, as well at pts steps to enter environment, limited home support, difficulty performing ADLS, difficulty performing IADLS  and limited insight into deficits limit pts ability to safely engage in all baseline areas of occupation  Pt scored overall 55/100 on the Barthel Index  Based on the aforementioned OT evaluation, functional performance deficits, and assessments, pt has been identified as a [employer] complexity evaluation  Pt to continue to benefit from continued acute OT services during hospital stay to address defined deficits and to maximize level of functional independence in the following Occupational Performance areas: grooming, bathing/shower, toilet hygiene, dressing, medication management, health maintenance, functional mobility, community mobility, clothing management, cleaning, meal prep and household maintenance  From OT standpoint, discharge recommendations TBD pending progress 2* cognitive deficits, limited social support, and environmental barriers upon D/C   OT will continue to follow pt 3-5x/wk to address the following goals to  w/in 10-14 days:     OT Discharge Recommendation: Other (Comment)(TBD pending progress, will continue to monitor)

## 2021-01-21 NOTE — ASSESSMENT & PLAN NOTE
- Goal of normotension as stroke onset several days ago  - Patient is not on an antihypertensive at baseline    - Defer antihypertensive regimen to primary team

## 2021-01-21 NOTE — ED NOTES
Per ultrasound pt should remain NPO except water till ultrasound test completed sometime around 19:00     Juan Carlos Kohli RN  01/21/21 [id number]

## 2021-01-21 NOTE — ASSESSMENT & PLAN NOTE
- Daily alcohol use  - "At least 40 oz of beer or a 12 pack of beer plus liquor"  - States he relpased a few months ago with longest duration of sobriety being 5 months  - Labs consistent with liver failure, likely in the setting of chronic alcohol use  - [facility] protocol  - Thiamine, Folate

## 2021-01-21 NOTE — ASSESSMENT & PLAN NOTE
- Troponin 9 33-->10-->7 76  - ANA pending  - per Cardiology, not felt to have coronary syndrome and heparin drip has been discontinued

## 2021-01-21 NOTE — OCCUPATIONAL THERAPY NOTE
Occupational Therapy Evaluation     Patient Name: Diana Orozco  [id number] Date: 1/21/2021  Problem List  Principal Problem:    Acute CVA (cerebrovascular accident) [facility])  Active Problems:    MDD (major depressive disorder), recurrent episode, moderate ([facility] )    NSTEMI (non-ST elevated myocardial infarction) ([facility] )    Alcohol abuse    Drug abuse ([facility] )    Acute liver failure without hepatic coma    Macrocytic anemia    Elevated serum creatinine    Hypertensive urgency    Rhabdomyolysis    Super obese    Past Medical History  Past Medical History:   Diagnosis Date    High blood pressure      Past Surgical History  Past Surgical History:   Procedure Laterality Date    HERNIA REPAIR             01/21/21 1415   Note Type   Note type Evaluation   Restrictions/Precautions   Weight Bearing Precautions Per Order No   Other Precautions Cognitive; Chair Alarm; Bed Alarm;Multiple lines;Telemetry; Fall Risk   Pain Assessment   Pain Assessment Tool Pain Assessment not indicated - pt denies pain   Pain Score No Pain   Home Living   Type of [address] One level;Stairs to enter with rails  (10 NORMA)   Bathroom Shower/Tub Tub/shower unit   Bathroom Toilet Standard   Bathroom Equipment Other (Comment)  (denies DME)   [address] Other (Comment)  (denies DME)   Additional Comments Pt lives alone (unreliable roommate) in a one level apt with 10 NORMA  Pt reports limited local support      Prior Function   Level of [place] Independent with ADLs and functional mobility   Lives With Alone;Friend(s)  (reports unreliable roommates, mostly lives alone)   Receives Help From Other (Comment)  (limited local support)   ADL Assistance Independent   IADLs Independent   Falls in the last 6 months 0   Vocational Self employed   Comments At baseline, pt was I w/ ADLs, IADLs, and functional mobility/transfers w/o use of AD, self employed, (-) , and denies falls PTA (however reports on floor x3 days)   Lifestyle   Autonomy At baseline, pt was I w/ ADLs, IADLs, and functional mobility/transfers w/o use of AD, self employed, (-) , and denies falls PTA (however reports on floor x3 days)   Reciprocal Relationships 2 sisters  Reports limited local support   Service to Others Self-employed- Trading [employer]   Intrinsic Gratification "Exercising, eating healthy, and drugs and alcohol"   Psychosocial   Psychosocial (WDL) WDL   ADL   Where Assessed Edge of bed   Eating Assistance 5  Supervision/Setup   Grooming Assistance 5  Supervision/Setup   UB Bathing Assistance 5  Supervision/Setup    [address] 5  Supervision/Setup   LB Dressing Assistance 4  [address] 4  Minimal Assistance   Bed Mobility   Supine to [address]   Additional items HOB elevated; Bedrails; Increased time required   Sit to Supine 5  Supervision   Additional items Increased time required;Verbal cues   Additional Comments Pt lying supine at end of session w/ call bell and phone within reach  All needs met and pt reports no further questions for OT at this time   Transfers   Sit to Stand 5  Supervision   Additional items Increased time required;Verbal cues   Stand to Sit 5  Supervision   Additional items Increased time required;Verbal cues   Additional Comments Cues for safe technique and hand placement   Functional Mobility   Functional Mobility 5  Supervision   Additional Comments Assist x1 w/o use of AD; Impulsive, cues for pacing and safety awareness   Balance   Static Sitting Good   Dynamic Sitting Fair +   Static Standing Fair   Dynamic Standing Fair -   Ambulatory Fair -   Activity Tolerance   Activity Tolerance Patient tolerated treatment well;Treatment limited secondary to medical complications (Comment)   Medical Staff Made Ruby Duong, PT   Nurse Made Aware yes;  Pt appropriate to be seen per nursing   RUE Assessment   RUE Assessment WFL   RUE Strength   RUE Overall Strength Within Functional Limits - able to perform ADL tasks with strength  (4/5 throughout)   LUE Assessment   LUE Assessment WFL   LUE Strength   LUE Overall Strength Within Functional Limits - able to perform ADL tasks with strength  (4/5 throughout)   Hand Function   Gross Motor Coordination Functional  (finger to nose: WFL)   Fine Motor Coordination Functional   Sensation   Light Touch Partial deficits in the LUE;Partial deficits in the RUE;Partial deficits in the RLE   Proprioception   Proprioception Partial deficits in the RUE   Vision - Complex Assessment   Ocular Range of Motion WFL   Head Position WDL   Tracking Able to track stimulus in all quads without difficulty   Acuity Able to read clock/calendar on wall without difficulty; Able to read employee name badge without difficulty   Additional Comments Reports "Blurred vision", able to accurately read employee name badge and clock  Perception   Inattention/Neglect Cues to attend to right side of body   Cognition   Overall Cognitive Status Impaired   Arousal/Participation Alert; Cooperative   Attention Attends with cues to redirect   Orientation Level Oriented X4   Memory Decreased recall of precautions;Decreased recall of recent events;Decreased short term memory   Following Commands Follows one step commands with increased time or repetition   Comments Pt reports feeling overall confused and increased difficulty with word finding  Unable to get into his cellphone 2* cannot remember password  Increased processing time, requiring repeated directions throughout exam  Cues for redirection with decreased attention  Assessment   Limitation Decreased ADL status; Decreased UE strength;Decreased Safe judgement during ADL;Decreased cognition;Decreased endurance;Decreased sensation;Decreased self-care trans;Decreased high-level ADLs   Prognosis Fair   Assessment Pt is a 41 y o  male seen for OT evaluation s/p adm to [facility] on 1/20/2021 w/ altered mental status and L-sided weakness, numbness, and tingling  Pt reports he was lying on the floor for 3 days  Pt admitted on stroke pathway and dx'd w/ Acute CVA, NSTEMI, Acute liver failure without hepatic coma, and rhabdomyolysis  CT head demonstrated subacute L MCA territory infarction  CTA head/neck- stable subacute ischemia L MCA distribution, embolic in nature  MRI brain pending  Comorbidities affecting pts functional performance include a significant PMH of Anxiety, Depression, HTN, and alcohol and drug abuse  Pt with active OT orders  Pt lives alone (unreliable roommate) in a one level apt with 10 [address]MA  Pt reports limited local support  At baseline, pt was I w/ ADLs, IADLs, and functional mobility/transfers w/o use of AD, self employed, (-) , and denies falls PTA (however reports on floor x3 days)  Upon evaluation, pt currently requires Supervision for UB ADLs, Min A for LB ADLs, Min A for toileting, Supervision for bed mobility, and Supervision for functional mobility/transfers 2* the following deficits impacting occupational performance: weakness, decreased strength, decreased balance, decreased tolerance, impaired memory, impaired problem solving and decreased safety awareness  These impairments, as well at pts steps to enter environment, limited home support, difficulty performing ADLS, difficulty performing IADLS  and limited insight into deficits limit pts ability to safely engage in all baseline areas of occupation  Pt scored overall 55/100 on the Barthel Index  Based on the aforementioned OT evaluation, functional performance deficits, and assessments, pt has been identified as a [employer] complexity evaluation   Pt to continue to benefit from continued acute OT services during hospital stay to address defined deficits and to maximize level of functional independence in the following Occupational Performance areas: grooming, bathing/shower, toilet hygiene, dressing, medication management, health maintenance, functional mobility, community mobility, clothing management, cleaning, meal prep and household maintenance  From OT standpoint, discharge recommendations TBD pending progress 2* cognitive deficits, limited social support, and environmental barriers upon D/C  OT will continue to follow pt 3-5x/wk to address the following goals to  w/in 10-14 days:   Goals   Patient Goals To go home   LTG Time Frame 10-14   Long Term Goal Please refer to LTGs listed below   Plan   Treatment Interventions ADL retraining;UE strengthening/ROM; Functional transfer training; Endurance training;Patient/family training;Equipment evaluation/education; Compensatory technique education;Continued evaluation; Activityengagement   Goal Expiration Date 21   OT Treatment Day 0   OT Frequency 3-5x/wk   Recommendation   OT Discharge Recommendation Other (Comment)  (TBD pending progress, will continue to monitor)   Barthel Index   Feeding 10   Bathing 0   Grooming Score 5   Dressing Score 5   Bladder Score 10   Bowels Score 10   Toilet Use Score 5   Transfers (Bed/Chair) Score 10   Mobility (Level Surface) Score 0   Stairs Score 0   Barthel Index Score 55   Modified Madison Scale   Modified Madison Scale 3        GOALS    1  Pt will improve activity tolerance to G for min 30 min txment sessions for increase engagement in functional tasks    2  Pt will complete bed mobility at a Mod I level w/ G balance/safety demonstrated to decrease caregiver assistance required     3  Pt will complete UB/LB dressing/bathing w/ mod I using adaptive device and DME as needed    4  Pt will complete toileting w/ mod I w/ G hygiene/thoroughness using DME as needed    5  Pt will improve functional transfers to Mod I on/off all surfaces using DME as needed w/ G balance/safety     6   Pt will improve functional mobility during ADL/IADL/leisure tasks to Mod I using DME as needed w/ G balance/safety     7  Pt will be attentive 100% of the time during ongoing cognitive assessment w/ G participation to assist w/ safe d/c planning/recommendations    8  Pt will demonstrate G carryover of pt/caregiver education and training as appropriate w/o cues w/ good tolerance to increase safety during functional tasks    9  Pt will participate in simulated IADL management task to increase independence to Mod I w/ G safety and endurance    10  Pt will increase BUE strength by 1MM grade via AROM exercises to increase independence in ADLs and transfers    11  Pt will verbalize 3 potential fall hazards and identify appropriate compensatory techniques to decrease fall risk in home environment     12  Pt will increase standing tolerance to 8-10 mins with Fair+ dynamic standing balance to increase safety during participation in ADLs     13   Pt will engage in ongoing Visual assessments, screens, and activities t/o fx'l I/ADL/leisure tasks w/ G participation and mod I to A w/ adaptation and accomodations or rule out visual impairments      Des Briceño, OTR/L

## 2021-01-21 NOTE — ASSESSMENT & PLAN NOTE
· Hg/Hct 10/35, no baseline on file  · Will obtain iron panel, vitamin B12 and folate  · Monitor CBC

## 2021-01-21 NOTE — PLAN OF CARE
Problem: PHYSICAL THERAPY ADULT  Goal: Performs mobility at highest level of function for planned discharge setting  See evaluation for individualized goals  Description: Treatment/Interventions: Functional transfer training, LE strengthening/ROM, Elevations, Therapeutic exercise, Endurance training, Cognitive reorientation, Patient/family training, Equipment eval/education, Bed mobility, Gait training, Continued evaluation, Spoke to nursing, OT          See flowsheet documentation for full assessment, interventions and recommendations  Note: Prognosis: Good  Problem List: Impaired balance, Decreased mobility, Decreased cognition, Impaired judgement, Decreased safety awareness, Impaired sensation  Assessment: Dilia Rodriguez is a 42 yo male admitted to [facility] on 1/20/21 for acute CVA  Pta pt reports being independent w/ I/ADLs w/ no AD use for ambulation  PT consulted per pt POC for functional mobility assessment and d/c recommendations  Pt currently functioning at S for bed mobility, transfers and ambulation w/ no AD  Pt currently limited by decreased balance, mobility and cognition and impaired sensation  Pt will benefit from continued skilled IP PT to address the above mentioned impairments  in order to maximize recovery and increase functional independence when completing mobility and ADLs  Recommendation for d/c to be determined pending further progress in PT d/t  social and environmental barriers  End of session pt returned to supine in bed, w/all needs in reach  Barriers to Discharge: Inaccessible home environment, Decreased caregiver support  Barriers to Discharge Comments: NORMA and pt lives alone   PT Discharge Recommendation: Other (Comment)(will determine pending progress in PT )     PT - OK to Discharge: No    See flowsheet documentation for full assessment

## 2021-01-21 NOTE — PHYSICAL THERAPY NOTE
PHYSICAL THERAPY EVAL        Patient Name: Tj Grossman  ATUCT'R Date: 1/21/2021   Time: 5253-4232       01/21/21 1351   PT Last Visit   PT Visit Date 01/21/21   Note Type   Note type Evaluation   Pain Assessment   Pain Assessment Tool Pain Assessment not indicated - pt denies pain   Pain Score No Pain   Home Living   Type of [address] One level;Stairs to enter with rails   Bathroom Shower/Tub Tub/shower unit   [employer] Standard   Additional Comments 10 [address] w partial HR  Prior Function   Level of [place] Independent with ADLs and functional mobility   Lives With Alone;Friend(s)  (pt reports having unreliable roommate)   ADL Assistance Independent   IADLs Independent   Falls in the last 6 months 0   Vocational Full time employment   Comments reports limited social support/ no local family or friends   Restrictions/Precautions   Other Precautions Cognitive; Chair Alarm; Bed Alarm;Multiple lines;Telemetry   General   Additional Pertinent History pt admitted 1/20/21 for acute CVA      Cognition   Overall Cognitive Status WFL   Arousal/Participation Cooperative   Orientation Level Oriented X4   Memory Decreased short term memory;Decreased recall of recent events;Decreased recall of precautions   Following Commands Follows one step commands with increased time or repetition   Comments pt with difficutly word find and slow to respond    RLE Assessment   RLE Assessment WFL  (grossly 3+/5 )   LLE Assessment   LLE Assessment WFL  (grossly 3+/5 )   Coordination   Sensation X  (pt reports R LE numbness )   Light Touch   RLE Light Touch Grossly intact   LLE Light Touch Grossly intact   Bed Mobility   Supine to Sit 5  Supervision   Additional items Increased time required;Verbal cues   Sit to Supine 5  Supervision   Additional items Increased time required;Verbal cues   Transfers   Sit to Stand 5  Supervision Additional items Increased time required;Verbal cues   Stand to Sit 5  Supervision   Additional items Increased time required;Verbal cues   Additional Comments vc for safety    Ambulation/Elevation   Gait pattern Excessively slow; Short stride; Step to; Wide FRANCH[place]KA; Decreased foot clearance   Gait Assistance 5  Supervision   Additional items Assist x 1   Assistive Device None   Distance 1'x2 sidestepping, 1' forward/backward    Balance   Static Sitting Good   Dynamic Sitting Fair +   Static Standing Fair   Dynamic Standing Fair -   Ambulatory Fair -   Endurance Deficit   Endurance Deficit No   Activity Tolerance   Activity Tolerance Patient tolerated treatment well;Treatment limited secondary to medical complications (Comment)   Medical Staff Karina Alvarado    Nurse Made Aware Yes    Assessment   Prognosis Good   Problem List Impaired balance;Decreased mobility; Decreased cognition; Impaired judgement;Decreased safety awareness; Impaired sensation   Assessment Lucila Mortimer is a 42 yo male admitted to [facility] on 1/20/21 for acute CVA  Pta pt reports being independent w/ I/ADLs w/ no AD use for ambulation  PT consulted per pt POC for functional mobility assessment and d/c recommendations  Pt currently functioning at S for bed mobility, transfers and ambulation w/ no AD  Pt currently limited by decreased balance, mobility and cognition and impaired sensation  Pt will benefit from continued skilled IP PT to address the above mentioned impairments  in order to maximize recovery and increase functional independence when completing mobility and ADLs  Recommendation for d/c to be determined pending further progress in PT d/t  social and environmental barriers  End of session pt returned to supine in bed, w/all needs in reach      Barriers to Discharge Inaccessible home environment;Decreased caregiver support   Barriers to Discharge Comments NORMA and pt lives alone    Goals   Patient Goals to go home    STG Expiration Date 02/04/21   Short Term Goal #1 Pt will function at Mod I for bed mobility  2  Pt will function at Mod I for transfers and ambulation  3  Pt will ambulate >150'x1 w/ least restrictive AD for community/household distances  4  Pt will increase activity tolerance to 45 minutes  5  Pt will negotiate stairs at Mod I for safe d/c home  6  Increase Barthel  by MCID value OF 35 to facilitate independence   7  PT for ongoing patient and family/caregiver education, DME needs and d/c planning in order to promote highest level of function in least restrictive environment  PT Treatment Day 0   Plan   Treatment/Interventions Functional transfer training;LE strengthening/ROM; Elevations; Therapeutic exercise; Endurance training;Cognitive reorientation;Patient/family training;Equipment eval/education; Bed mobility;Gait training;Continued evaluation;Spoke to nursing;OT   PT Frequency Other (Comment)  (3-5x)   Recommendation   PT Discharge Recommendation Other (Comment)  (will determine pending progress in PT )   PT - OK to Discharge No   Additional Comments pending progress in PT    AM-PAC Basic Mobility Inpatient   Turning in Bed Without Bedrails 4   Lying on Back to Sitting on Edge of Flat Bed 3   Moving Bed to Chair 3   Standing Up From Chair 3   Walk in Room 3   Climb 3-5 Stairs 3   Basic Mobility Inpatient Raw Score 19   Basic Mobility Standardized Score 42 48   Modified Dalton Scale   Modified Dalton Scale 3   Barthel Index   Feeding 10   Bathing 5   Grooming Score 5   Dressing Score 5   Bladder Score 10   Bowels Score 10   Toilet Use Score 5   Transfers (Bed/Chair) Score 10   Mobility (Level Surface) Score 0   Stairs Score 0   Barthel Index Score 60     History: coping styles, social background(lives alone),  fall risk,   cognition, multiple lines  Exam: impairments in systems including musculoskeletal (, strength, ), neuromuscular (balance,locomotion, gait, transfers, motor function and learning, Lt/Sensation),  Cognition, Barthel Index Clinical: unstable/unpredictable, ongoing management for primary admission   Complexity:high    Justyna Wood, SPT

## 2021-01-21 NOTE — ASSESSMENT & PLAN NOTE
· CT head: Subacute left MCA territory infarction, no evidence of hemorrhagic conversion  · Will place on stroke pathway  · Brain MRI and echo ordered  · PT OT SLP consult  · Start ASA and statin  · Allow for permissive HTN  · Will obtain lipid panel and A1c  · Neurology consult

## 2021-01-21 NOTE — ED NOTES
Security at pt bedside for pt cash   Pt valuable bag tag # [id number]     Negrito Koehler RN  01/21/21 3320

## 2021-01-21 NOTE — H&P
H&P- Kendra Jatinder 1980, 41 y o  male MRN: [id number]    Unit/Bed#: ED 16 Encounter: [id number]    Primary Care Provider: No primary care provider on file  Date and time admitted to hospital: 1/20/2021 10:49 PM      * Acute CVA (cerebrovascular accident) [facility])  Assessment & Plan  · CT head: Subacute left MCA territory infarction, no evidence of hemorrhagic conversion  · Will place on stroke pathway  · Brain MRI and echo ordered  · PT OT SLP consult  · Start ASA and statin  · Allow for permissive HTN  · Will obtain lipid panel and A1c  · Neurology consult    NSTEMI (non-ST elevated myocardial infarction) ([facility] [address] )  Assessment & Plan  · Troponin 9 3, EKG shows T-wave inversions, rate 85   Continue to trend troponin and EKG  · Given ASA bolus, will give Plavix bolus  · Continue heparin drip ACS protocol, monitor PTT  · Add ASA and atorvastatin 40 mg daily  · PRN NTG if chest pain   · Lipid panel and HgA1c   · Echocardiogram ordered  · Continuous telemetry monitoring  · Cardiology consult    Acute liver failure without hepatic coma  Assessment & Plan  · AST/ALT 1,099/3144, normal Alk P, T bili 1 01  · CT abdomen shows unremarkable liver and biliary tree, no gallstones  · Coagulopathy INR 1 3  · Will obtain acute hepatitis panel  · Avoid hepatotoxins  · GI consult    Rhabdomyolysis  Assessment & Plan  · CK 17,316  · Aggressive IV hydration  · Monitor CK    Elevated serum creatinine  Assessment & Plan  · Creatinine 1 3, no baseline on file  · IV hydration  · Avoid nephrotoxins  · Monitor BMP    Hypertensive urgency  Assessment & Plan  · /90, 171/88  · Allow for permissive hypertension during stroke pathway    Macrocytic anemia  Assessment & Plan  · Hg/Hct 10/35, no baseline on file  · Will obtain iron panel, vitamin B12 and folate  · Monitor CBC    Alcohol abuse  Assessment & Plan  · Admits to drinking alcohol daily  · Add CIWA protocol  · MVI thiamine and folate  · Host referral  · Monitor lytes    Drug abuse [facility])  Assessment & Plan  · Reports taking Percocet tablets and anabolic steroids  · Host referral    Super obese  Assessment & Plan  · Weight loss, dietary and lifestyle modifications    MDD (major depressive disorder), recurrent episode, moderate ([address] )  Assessment & Plan  · Followed outpatient by Psychiatry, maintained on p r n  Hydroxyzine 50mg and mirtazapine 30 mg at bedtime      VTE Prophylaxis: Heparin Drip  / sequential compression device   Code Status: fc  POLST: POLST is not applicable to this patient  Discussion with family:     Anticipated Length of Stay:  Patient will be admitted on an Inpatient basis with an anticipated length of stay of  > 2 midnights  Justification for Hospital Stay:  Acute CVA, NSTEMI, liver failure, rhabdomyolysis    Total Time for Visit, including Counseling / Coordination of Care: 1 hour  Greater than 50% of this total time spent on direct patient counseling and coordination of care  Chief Complaint:   Left hand pain, numbness and tingling    History of Present Illness:    Jerica Restrepo is a 41 y o  male with PMH HTN, anxiety, depression, alcohol and drug abuse who presents with c/o left hand and left leg pain, numbness, tingling and weakness  Reports associated confusion and word-finding difficulty  States 3 days ago he drank alcohol, took 2 or 3 tablets of Percocet and anabolic steroids, fell asleep and woke up the next day on the floor  States he had a difficult time getting into bed  Eventually he was able to go back into bed and has been resting in bed for 3 days, has not eaten or drank any fluids  Confusion and weakness persisted, he had difficulty ambulating  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  Review of Systems:    Review of Systems   Constitutional: Positive for appetite change   Negative for chills, diaphoresis and fever         Loss of appetite   HENT: Negative  Respiratory: Negative  Cardiovascular: Negative  Gastrointestinal: Negative  Genitourinary: Negative  Musculoskeletal: Positive for gait problem and myalgias  Skin: Negative  Neurological: Positive for weakness and numbness  Paresthesias, word-finding difficulty   Psychiatric/Behavioral: Positive for confusion  Past Medical and Surgical History:     Past Medical History:   Diagnosis Date    High blood pressure        Past Surgical History:   Procedure Laterality Date    HERNIA REPAIR         Meds/Allergies:    Prior to Admission medications    Medication Sig Start Date End Date Taking? Authorizing Provider   hydrOXYzine HCL (ATARAX) 50 mg tablet Take 1 tablet (50 mg total) by mouth 3 (three) times a day as needed for anxiety 11/13/20 1/12/21  Isabelle Jimenez MD   mirtazapine (REMERON) 30 mg tablet TAKE 1 TABLET (30 MG TOTAL) BY MOUTH DAILY AT BEDTIME 1/8/21 3/9/21  Isabelle Jimenez MD   Prasterone, DHEA, 50 MG TABS Take by mouth    Historical Provider, MD     I have reviewed home medications using allscripts  Allergies: No Known Allergies    Social History:     Marital Status: Single   Occupation: [employer]  Patient Pre-hospital Living Situation:  Resides at home alone  Patient Pre-hospital Level of Mobility:  Ambulatory  Patient Pre-hospital Diet Restrictions: none  Substance Use History:   Social History     Substance and Sexual Activity   Alcohol Use Yes    Comment: states "Im trying to quit"      Social History     Tobacco Use   Smoking Status Unknown If Ever Smoked   Smokeless Tobacco Current User    Types: Chew     Social History     Substance and Sexual Activity   Drug Use Yes    Comment: rakesh        Family History:    History reviewed  No pertinent family history      Physical Exam:     Vitals:   Blood Pressure: 150/76 (01/21/21 0215)  Pulse: 76 (01/21/21 0215)  Temperature: 98 8 °F (37 1 °C) (01/20/21 2258)  Temp Source: Oral (01/20/21 2258)  Respirations: 20 (01/21/21 0215)  Weight - Scale: (!) 148 kg (325 lb 12 8 oz) (01/21/21 0053)  SpO2: 97 % (01/21/21 0215)    Physical Exam  Constitutional:       General: He is not in acute distress  Appearance: Normal appearance  He is obese  He is not ill-appearing, toxic-appearing or diaphoretic  Comments: Morbid obesity   HENT:      Head: Normocephalic and atraumatic  Nose: No congestion or rhinorrhea  Mouth/Throat:      Mouth: Mucous membranes are moist    Eyes:      General: No scleral icterus  Extraocular Movements: Extraocular movements intact  Conjunctiva/sclera: Conjunctivae normal    Neck:      Musculoskeletal: Neck supple  Cardiovascular:      Rate and Rhythm: Normal rate and regular rhythm  Heart sounds: Normal heart sounds  Pulmonary:      Effort: Pulmonary effort is normal       Breath sounds: Normal breath sounds  Abdominal:      General: There is no distension  Palpations: Abdomen is soft  There is no mass  Tenderness: There is no abdominal tenderness  There is no guarding  Comments: Decreased bowel sounds   Musculoskeletal:         General: Tenderness present  Right lower leg: Edema present  Left lower leg: Edema present  Comments: B/LLE edema 2+   Skin:     General: Skin is warm  Coloration: Skin is not jaundiced or pale  Neurological:      General: No focal deficit present  Mental Status: He is alert and oriented to person, place, and time  Comments: EOMI, PERRLA, equal strength B/L UE 5/5 B/LLE 5/5, clear speech, aphasia   Psychiatric:         Mood and Affect: Mood normal          Behavior: Behavior normal          Thought Content: Thought content normal          Judgment: Judgment normal        Additional Data:     Lab Results: I have personally reviewed pertinent reports        Results from last 7 days   Lab Units 01/20/21  2332   WBC Thousand/uL 9 45   HEMOGLOBIN g/dL 10 1*   HEMATOCRIT % 35 5*   PLATELETS Thousands/uL 295   NEUTROS PCT % 75   LYMPHS PCT % 15   MONOS PCT % 9   EOS PCT % 1     Results from last 7 days   Lab Units 01/20/21  2332   SODIUM mmol/L 141   POTASSIUM mmol/L 3 8   CHLORIDE mmol/L 102   CO2 mmol/L 31   BUN mg/dL 23   CREATININE mg/dL 1 34*   ANION GAP mmol/L 8   CALCIUM mg/dL 9 0   ALBUMIN g/dL 3 3*   TOTAL BILIRUBIN mg/dL 1 01*   ALK PHOS U/L 70   ALT U/L 3,144*   AST U/L 1,099*   GLUCOSE RANDOM mg/dL 89     Results from last 7 days   Lab Units 01/21/21  0104   INR  1 36*                   Imaging: I have personally reviewed pertinent reports  CT head without contrast   Final Result by Jeramie Ch MD (01/21 0117)      Subacute left MCA territory infarction, as described above  No evidence of hemorrhagic conversion  Neurology consultation and follow-up is recommended  I personally discussed this study with DR Brenna Bang on 1/21/2021 at 12:59 AM                      Workstation performed: [id number]         CT chest abdomen pelvis w contrast   Final Result by Jeramie Ch MD (01/21 0141)      No evidence of acute intrathoracic, intra-abdominal or intrapelvic parenchymal organ injury  No pneumothorax  There is a moderate to large hiatal hernia  Bilateral gynecomastia  I personally discussed this study with DR Brenna Bang on 1/21/2021 at 12:59 AM                Workstation performed: [id number]         CT spine cervical without contrast   Final Result by Jeramie Ch MD (01/21 0125)      No cervical spine fracture or traumatic malalignment  There is mild gentle reversal of cervical lordosis  Mild degenerative changes are present                     Workstation performed: [id number]         MRI Inpatient Order    (Results Pending)       EKG, Pathology, and Other Studies Reviewed on Admission:   · EKG: ct    Allscripts / Epic Records Reviewed: Yes     ** Please Note: This note has been constructed using a voice recognition system   **

## 2021-01-21 NOTE — CONSULTS
Consult - Cardiology   Kendra Tobias 41 y o  male MRN: [id number]  Unit/Bed#: ED 16 Encounter: [id number]        Reason For Consult:  Abnormal troponin                  Assessment:  Subacute left MCA territory CVA:   Initial CT of the head with evidence of CVA without sign of hemorrhagic conversion or intracranial hemorrhage   Rhabdomyolysis (present on admission)   Presenting CK 17,316 (MB index <1)  Probable REANNA (POA):   Presenting creatinine 1 34 without prior values for comparison  Hypertension - present and uncontrolled on admission:   O/p Rx:  None  Probable Non MI troponin elevation:  due to the above  Abuse of alcohol and narcotics:  Anabolic steroid use:  Last was approximately 1 week ago  Obesity  Bipolar disorder    Discussion / Plan:  · Non MI troponin elevation with peak value of 10 0 and now waning  · Check echocardiogram  · Continue IVF following GFR and trends in CPK  · ECG for any complaints of acute pain  · Expecting some component of permissive hypertension is desired by Neurology we will defer to them regarding implementation of antihypertensive with suggestion to treat with Norvasc with BP goal determined by them  · Patient currently receiving dual antiplatelet therapy which from a cardiac perspective is unessential ~~>continuation and duration of therapy per Neurology  · As part of stroke pathway order set the patient has been initiating on a statin ~~> He has quite favorable lipid levels with a total cholesterol of 87, triglycerides 80, and LDL 46 with transaminitis with decision regarding continued Rx need per primary service and Neurology      History Of Present Illness:  Kendra Tobias is a 25-year-old without routine care by a PCP    He has not had other subspecialty care with the exception of prior treatment by a psychiatrist   Aside from his bipolar disorder he is unaware of any particular medical diagnoses and states he has with the exception of mirtazapine previously taking no prescription medicines  He regularly drinks an excessive amount of alcohol - typically not less than 6 bottles of beer and some mixed drinks daily  He frequently takes narcotics with additional history of episodic anabolic steroid use - he had for the last few months been using these 2-3 times per week with his last use just over a week ago  Patient reports that approximately 3-4 days ago he had some sort of unspecified event resulting in his ending up on the ground  Again with vague details he states he woke up in his own urine  He had significant weakness affecting the right side and lower limbs more than upper  Because of this he was unable to get off the floor which is where he lay for at least 48 hours without food or water  Patient states he ultimately was able to workup the strength to get himself off the floor and into bed which is where he lay for another day  With this the patient also felt confused indicating he cannot remember his computer pass words additionally with what may have been some word-finding  With improvement but continued symptoms he ultimately called an ambulance and was brought to the hospital rate CT scan of the head shows signs of subacute infarct  In addition to the above the patient on arrival was found to have uncontrolled hypertension which is ongoing to the present time  He also has signs of transaminitis, abnormal creatinine, and signs of rhabdomyolysis with CPK over 17,000 with normal MB index  The stroke pathway also includes checking troponin levels with initial value of 9 3 and subsequent peak of 10 0 with most recent value of 7 7 which is the reason for our consultation  The patient denies any present or prior symptoms of chest pain or cardiac ectopy/tachycardia  He reports no effort intolerance  A few times a week he does weight training with good tolerance of the same        Past Medical History:        Past Medical History:   Diagnosis Date    High blood pressure Past Surgical History:   Procedure Laterality Date    HERNIA REPAIR          Allergy:        No Known Allergies    Medications:       Prior to Admission medications    Medication Sig Start Date End Date Taking? Authorizing Provider   hydrOXYzine HCL (ATARAX) 50 mg tablet Take 1 tablet (50 mg total) by mouth 3 (three) times a day as needed for anxiety 11/13/20 1/12/21  Mary Oliveros MD   mirtazapine (REMERON) 30 mg tablet TAKE 1 TABLET (30 MG TOTAL) BY MOUTH DAILY AT BEDTIME 1/8/21 3/9/21  Mary Oliveros MD   Prasterone, DHEA, 50 MG TABS Take by mouth    Historical Provider, MD       Family History:     History reviewed  No pertinent family history       Social History:       Social History     Socioeconomic History    Marital status: Single     Spouse name: None    Number of children: None    Years of education: None    Highest education level: None   Occupational History    None   Social Needs    Financial resource strain: None    Food insecurity     Worry: None     Inability: None    Transportation needs     Medical: None     Non-medical: None   Tobacco Use    Smoking status: Unknown If Ever Smoked    Smokeless tobacco: Current User     Types: Chew   Substance and Sexual Activity    Alcohol use: Yes     Comment: states "Im trying to quit"     Drug use: Yes     Comment: percocets     Sexual activity: None   Lifestyle    Physical activity     Days per week: None     Minutes per session: None    Stress: None   Relationships    Social connections     Talks on phone: None     Gets together: None     Attends [employer] service: None     Active member of club or organization: None     Attends meetings of clubs or organizations: None     Relationship status: None    Intimate partner violence     Fear of current or ex partner: None     Emotionally abused: None     Physically abused: None     Forced sexual activity: None   Other Topics Concern    None   Social History Narrative    None ROS:  Symptoms per HPI  Though improved the patient currently has some difficulty with fine motor function overall strength of his right side  The remainder of the review of systems is negative    Exam:  General:  Alert, normally conversant, comfortable appearing  Head: Normocephalic, atraumatic  Eyes:  Pupils - equal, round  No icterus  Normal Conjunctiva  Oropharynx: Moist without lesion  Neck:  No gross bruit, JVD, thyromegaly, or lymphadenopathy  Heart:  Regular with controlled rate  No rub nor pathologic murmur  Lungs:  Clear without rales/rhonchi/wheeze  Abdomen:  Soft and nontender with normal bowel sounds  No organomegaly or mass  Lower Limbs:  No edema  Pulses[de-identified]  RLE - DP:  2+                 LLE - DP:  2+  Musculoskeletal: Independent movement of limbs observed, Formal ROM and strength eval not performed  Neurologic:    Oriented to: person, place, situation  Cranial Nerves:  Grossly intact - vision taste and hearing were not evaluated     Motor function:  Not tested   Sensation: Was not tested    Vitals:    01/21/21 0315 01/21/21 0415 01/21/21 0515 01/21/21 0618   BP: 158/96 (!) 172/90 (!) 189/88 (!) 194/93   BP Location:       Pulse: 79 80 70 91   Resp: 20 20 20 18   Temp:    (!) 97 4 °F (36 3 °C)   TempSrc:       SpO2: 97% 97% 97% 99%   Weight:    (!) 148 kg (326 lb 4 5 oz)   Height:    5' 9" (1 753 m)           DATA:      ECG:    Normal sinus rhythm with normal intervals  Nonspecific inferior and anterolateral T-wave inversion without ST segment shift                  -----------------------------------------------------------------------------------------------------------------------------------------------  Telemetry:   Normal sinus rhythm without ectopy with ventricular rate trend in the 70s        -----------------------------------------------------------------------------------------------------------------------------------------------  Weights:     Wt Readings from Last 3 Encounters:   01/21/21 (!) 148 kg (326 lb 4 5 oz)   , Body mass index is 48 18 kg/m²           Lab Studies:    Results from last 7 days   Lab Units 01/21/21  0618 01/21/21 0238 01/20/21  2332   CK TOTAL U/L  --   --  17,316*   TROPONIN I ng/mL 7 76* 10 00* 9 33*   CK MB INDEX %  --   --  <1 0        Results from last 7 days   Lab Units 01/21/21 0618   TRIGLYCERIDES mg/dL 80   HDL mg/dL 25*     Results from last 7 days   Lab Units 01/21/21  0618 01/20/21  2332   WBC Thousand/uL 9 77 9 45   HEMOGLOBIN g/dL 9 0* 10 1*   HEMATOCRIT % 32 3* 35 5*   PLATELETS Thousands/uL 251 295   ,   Results from last 7 days   Lab Units 01/21/21  0618 01/20/21  2332   POTASSIUM mmol/L 4 0 3 8   CHLORIDE mmol/L 105 102   CO2 mmol/L 30 31   BUN mg/dL 17 23   CREATININE mg/dL 1 19 1 34*   CALCIUM mg/dL 8 0* 9 0   ALK PHOS U/L 59 70   ALT U/L 2,552* 3,144*   AST U/L 743* 1,099*

## 2021-01-21 NOTE — ASSESSMENT & PLAN NOTE
· Admits to drinking alcohol daily  · Add CIWA protocol  · MVI thiamine and folate  · Host referral  · Monitor lytes

## 2021-01-22 ENCOUNTER — APPOINTMENT (INPATIENT)
Dept: MRI IMAGING | Facility: HOSPITAL | Age: 41
DRG: 064 | End: 2021-01-22

## 2021-01-22 ENCOUNTER — APPOINTMENT (INPATIENT)
Dept: ULTRASOUND IMAGING | Facility: HOSPITAL | Age: 41
DRG: 064 | End: 2021-01-22

## 2021-01-22 PROBLEM — D50.9 MICROCYTIC ANEMIA: Status: ACTIVE | Noted: 2021-01-21

## 2021-01-22 LAB
ALBUMIN SERPL BCP-MCNC: 3 G/DL (ref 3.5–5)
ALP SERPL-CCNC: 63 U/L (ref 46–116)
ALT SERPL W P-5'-P-CCNC: 1834 U/L (ref 12–78)
ANION GAP SERPL CALCULATED.3IONS-SCNC: 6 MMOL/L (ref 4–13)
APTT PPP: 29 SECONDS (ref 23–37)
AST SERPL W P-5'-P-CCNC: 266 U/L (ref 5–45)
BILIRUB DIRECT SERPL-MCNC: 0.26 MG/DL (ref 0–0.2)
BILIRUB SERPL-MCNC: 0.83 MG/DL (ref 0.2–1)
BUN SERPL-MCNC: 12 MG/DL (ref 5–25)
CALCIUM SERPL-MCNC: 8.6 MG/DL (ref 8.3–10.1)
CHLORIDE SERPL-SCNC: 106 MMOL/L (ref 100–108)
CO2 SERPL-SCNC: 27 MMOL/L (ref 21–32)
CREAT SERPL-MCNC: 1.01 MG/DL (ref 0.6–1.3)
GFR SERPL CREATININE-BSD FRML MDRD: 91 ML/MIN/1.73SQ M
GLUCOSE SERPL-MCNC: 111 MG/DL (ref 65–140)
INR PPP: 1.25 (ref 0.84–1.19)
MAGNESIUM SERPL-MCNC: 1.8 MG/DL (ref 1.6–2.6)
NT-PROBNP SERPL-MCNC: 148 PG/ML
POTASSIUM SERPL-SCNC: 3.8 MMOL/L (ref 3.5–5.3)
PROT SERPL-MCNC: 7.3 G/DL (ref 6.4–8.2)
PROTHROMBIN TIME: 15.5 SECONDS (ref 11.6–14.5)
SODIUM SERPL-SCNC: 139 MMOL/L (ref 136–145)

## 2021-01-22 PROCEDURE — 85303 CLOT INHIBIT PROT C ACTIVITY: CPT | Performed by: PSYCHIATRY & NEUROLOGY

## 2021-01-22 PROCEDURE — 80076 HEPATIC FUNCTION PANEL: CPT | Performed by: PHYSICIAN ASSISTANT

## 2021-01-22 PROCEDURE — 85610 PROTHROMBIN TIME: CPT | Performed by: PHYSICIAN ASSISTANT

## 2021-01-22 PROCEDURE — 85613 RUSSELL VIPER VENOM DILUTED: CPT | Performed by: PSYCHIATRY & NEUROLOGY

## 2021-01-22 PROCEDURE — 85306 CLOT INHIBIT PROT S FREE: CPT | Performed by: PSYCHIATRY & NEUROLOGY

## 2021-01-22 PROCEDURE — 81241 F5 GENE: CPT | Performed by: PSYCHIATRY & NEUROLOGY

## 2021-01-22 PROCEDURE — 85670 THROMBIN TIME PLASMA: CPT | Performed by: PSYCHIATRY & NEUROLOGY

## 2021-01-22 PROCEDURE — 85300 ANTITHROMBIN III ACTIVITY: CPT | Performed by: PSYCHIATRY & NEUROLOGY

## 2021-01-22 PROCEDURE — 83735 ASSAY OF MAGNESIUM: CPT | Performed by: HOSPITALIST

## 2021-01-22 PROCEDURE — 99232 SBSQ HOSP IP/OBS MODERATE 35: CPT | Performed by: INTERNAL MEDICINE

## 2021-01-22 PROCEDURE — 92507 TX SP LANG VOICE COMM INDIV: CPT

## 2021-01-22 PROCEDURE — 86146 BETA-2 GLYCOPROTEIN ANTIBODY: CPT | Performed by: PSYCHIATRY & NEUROLOGY

## 2021-01-22 PROCEDURE — 83880 ASSAY OF NATRIURETIC PEPTIDE: CPT | Performed by: INTERNAL MEDICINE

## 2021-01-22 PROCEDURE — 85705 THROMBOPLASTIN INHIBITION: CPT | Performed by: PSYCHIATRY & NEUROLOGY

## 2021-01-22 PROCEDURE — 85732 THROMBOPLASTIN TIME PARTIAL: CPT | Performed by: PSYCHIATRY & NEUROLOGY

## 2021-01-22 PROCEDURE — 85305 CLOT INHIBIT PROT S TOTAL: CPT | Performed by: PSYCHIATRY & NEUROLOGY

## 2021-01-22 PROCEDURE — 76705 ECHO EXAM OF ABDOMEN: CPT

## 2021-01-22 PROCEDURE — 99232 SBSQ HOSP IP/OBS MODERATE 35: CPT | Performed by: PSYCHIATRY & NEUROLOGY

## 2021-01-22 PROCEDURE — 85730 THROMBOPLASTIN TIME PARTIAL: CPT | Performed by: HOSPITALIST

## 2021-01-22 PROCEDURE — 80048 BASIC METABOLIC PNL TOTAL CA: CPT | Performed by: HOSPITALIST

## 2021-01-22 PROCEDURE — 99223 1ST HOSP IP/OBS HIGH 75: CPT | Performed by: STUDENT IN AN ORGANIZED HEALTH CARE EDUCATION/TRAINING PROGRAM

## 2021-01-22 PROCEDURE — 86147 CARDIOLIPIN ANTIBODY EA IG: CPT | Performed by: PSYCHIATRY & NEUROLOGY

## 2021-01-22 PROCEDURE — 81240 F2 GENE: CPT | Performed by: PSYCHIATRY & NEUROLOGY

## 2021-01-22 PROCEDURE — 94762 N-INVAS EAR/PLS OXIMTRY CONT: CPT

## 2021-01-22 PROCEDURE — 97530 THERAPEUTIC ACTIVITIES: CPT

## 2021-01-22 PROCEDURE — 97116 GAIT TRAINING THERAPY: CPT

## 2021-01-22 RX ORDER — LABETALOL 20 MG/4 ML (5 MG/ML) INTRAVENOUS SYRINGE
10 EVERY 6 HOURS PRN
Status: DISCONTINUED | OUTPATIENT
Start: 2021-01-22 | End: 2021-01-29 | Stop reason: HOSPADM

## 2021-01-22 RX ORDER — AMLODIPINE BESYLATE 5 MG/1
5 TABLET ORAL DAILY
Status: DISCONTINUED | OUTPATIENT
Start: 2021-01-22 | End: 2021-01-29 | Stop reason: HOSPADM

## 2021-01-22 RX ADMIN — SODIUM CHLORIDE 250 ML/HR: 0.9 INJECTION, SOLUTION INTRAVENOUS at 03:45

## 2021-01-22 RX ADMIN — MIRTAZAPINE 30 MG: 15 TABLET, FILM COATED ORAL at 21:33

## 2021-01-22 RX ADMIN — THIAMINE HCL TAB 100 MG 100 MG: 100 TAB at 08:26

## 2021-01-22 RX ADMIN — ATORVASTATIN CALCIUM 40 MG: 40 TABLET, FILM COATED ORAL at 16:40

## 2021-01-22 RX ADMIN — AMLODIPINE BESYLATE 5 MG: 5 TABLET ORAL at 13:39

## 2021-01-22 RX ADMIN — ENOXAPARIN SODIUM 30 MG: 30 INJECTION SUBCUTANEOUS at 13:39

## 2021-01-22 RX ADMIN — ASPIRIN 81 MG: 81 TABLET, COATED ORAL at 08:25

## 2021-01-22 RX ADMIN — SODIUM CHLORIDE 150 ML/HR: 0.9 INJECTION, SOLUTION INTRAVENOUS at 20:50

## 2021-01-22 RX ADMIN — Medication 1 TABLET: at 08:25

## 2021-01-22 RX ADMIN — ENOXAPARIN SODIUM 30 MG: 30 INJECTION SUBCUTANEOUS at 21:33

## 2021-01-22 RX ADMIN — IRON SUCROSE 200 MG: 20 INJECTION, SOLUTION INTRAVENOUS at 13:38

## 2021-01-22 RX ADMIN — FOLIC ACID 1 MG: 1 TABLET ORAL at 08:26

## 2021-01-22 RX ADMIN — CLOPIDOGREL BISULFATE 75 MG: 75 TABLET ORAL at 08:25

## 2021-01-22 NOTE — PLAN OF CARE
Problem: PHYSICAL THERAPY ADULT  Goal: Performs mobility at highest level of function for planned discharge setting  See evaluation for individualized goals  Description: Treatment/Interventions: Functional transfer training, LE strengthening/ROM, Elevations, Therapeutic exercise, Endurance training, Cognitive reorientation, Patient/family training, Equipment eval/education, Bed mobility, Gait training, Continued evaluation, Spoke to nursing, OT          See flowsheet documentation for full assessment, interventions and recommendations  Outcome: Progressing  Note: Prognosis: Good  Problem List: Decreased range of motion, Impaired balance, Decreased mobility, Decreased coordination, Decreased cognition, Impaired judgement, Decreased safety awareness, Obesity, Impaired sensation  Assessment: Pt  supine in bed upon my arrival  Pt  reporting increased fatigue, with no pain however reports static feeling in RUE maybe some "numbness"  Performance of HEP with cues provided for proper completion  Progressed with transfers being able to complete practicing proper technique with no noted LOB  Pt  progressed with an amb  trial with no AD and standbyA of therapist with cues provided for LE sequencing  Pt  remains impulsive with movements, requiring cueing for pacing and increased safety awareness  Returned to supine in bed at end of treatment session  Due to social and environmental barriers PT would recommend d/c to inpatient pysch vs  rehab when medically stable for continued improvement of noted impairments above  Barriers to Discharge: Inaccessible home environment, Decreased caregiver support  Barriers to Discharge Comments: [place], lives alone  PT Discharge Recommendation: Other (Comment)(inpatient psych vs  pt  prefers rehab )     PT - OK to Discharge: Yes(if d/c when medically stable )    See flowsheet documentation for full assessment

## 2021-01-22 NOTE — ASSESSMENT & PLAN NOTE
Troponin peaked at 10 mg  EKG with nonspecific changes  No anginal symptoms  Likely secondary to rhabdomyolysis  Cardiology evaluated, no further ischemic evaluation

## 2021-01-22 NOTE — PROGRESS NOTES
Cardiology   MD Rehan Moy MD Ather Mansoor, MD Alisia Needle, DO, Noni Santos DO, [facility] [place]  -------------------------------------------------------------------  Formerly [facility]  [address] [place] [address] Rd  [place], [place] [address]  [phone]        Progress Note - Cardiology   Bhavana Levi 41 y o  male MRN: [id number]  Unit/Bed#: E4 -01 Encounter: [id number]        Assessment/Recommendations/Discussion:   1  Left MCA territory CVA   2  Rhabdomyolysis  3  Severe transaminitis secondary to shock liver/tylenol/alcohol use  4  REANNA  5  Hypertension  6  Non MI related troponin elevation secondary to above--peak troponin 10 0  7  Narcotic and alcohol abuse  8  Obesity    · Neurology requesting ANA  Initially scheduled for this morning at 11:00 a m , however more emergent case came in requiring ANA/cardioversion  Therefore will plan to do patient's ANA on Monday    Patient agreeable for ANA after indications, risks, benefits thoroughly reviewed  · Eventual outpatient ischemic evaluation in light of elevated troponin  · TTE with normal LVEF            Subjective:  Patient seen and examined, denies chest pain, shortness of breath          Physical Exam:  GEN:  NAD  HEENT:  MMM, NCAT, pink conjunctiva, EOMI, nonicteric sclera  CV:  NO JVD/HJR, RR, NO M/R/G, +S1/S2, NO PARASTERNAL HEAVE/THRILL, NO LE EDEMA, NO HEPATIC SYSTOLIC PULSATION, WARM EXTREMITIES  RESP:  CTAB/L  ABD:  SOFT, NT, NO GROSS ORGANOMEGALY        Vitals:   /86 (BP Location: Right arm)   Pulse 71   Temp 98 °F (36 7 °C) (Temporal)   Resp 18   Ht 5' 9" (1 753 m)   Wt (!) 148 kg (326 lb 4 5 oz)   SpO2 95%   BMI 48 18 kg/m²   Vitals:    01/21/21 0053 01/21/21 0618   Weight: (!) 148 kg (325 lb 12 8 oz) (!) 148 kg (326 lb 4 5 oz)       Intake/Output Summary (Last 24 hours) at 1/22/2021 1051  Last data filed at 1/22/2021 0501  Gross per 24 hour   Intake 1600 ml   Output 1850 ml Net -250 ml       TELEMETRY: SR  Lab Results:  Results from last 7 days   Lab Units 01/21/21  0618   WBC Thousand/uL 9 77   HEMOGLOBIN g/dL 9 0*   HEMATOCRIT % 32 3*   PLATELETS Thousands/uL 251     Results from last 7 days   Lab Units 01/21/21  0618   POTASSIUM mmol/L 4 0   CHLORIDE mmol/L 105   CO2 mmol/L 30   BUN mg/dL 17   CREATININE mg/dL 1 19   CALCIUM mg/dL 8 0*   ALK PHOS U/L 59   ALT U/L 2,552*   AST U/L 743*     Results from last 7 days   Lab Units 01/21/21  0618   POTASSIUM mmol/L 4 0   CHLORIDE mmol/L 105   CO2 mmol/L 30   BUN mg/dL 17   CREATININE mg/dL 1 19   CALCIUM mg/dL 8 0*           Medications:    Current Facility-Administered Medications:     acetaminophen (TYLENOL) tablet 650 mg, 650 mg, Oral, Q4H PRN, Stevo Rise, CRNP    aluminum-magnesium hydroxide-simethicone (MYLANTA) oral suspension 30 mL, 30 mL, Oral, Q6H PRN, Stevo Rise, CRNP    aspirin (ECOTRIN LOW STRENGTH) EC tablet 81 mg, 81 mg, Oral, Daily, Stevo Rise, CRNP, 81 mg at 01/22/21 0825    atorvastatin (LIPITOR) tablet 40 mg, 40 mg, Oral, QPM, Stevo Rise, CRNP, 40 mg at 01/21/21 1759    [COMPLETED] clopidogrel (PLAVIX) tablet 600 mg, 600 mg, Oral, Once, 600 mg at 01/21/21 0315 **AND** clopidogrel (PLAVIX) tablet 75 mg, 75 mg, Oral, Daily, Stevo Rise, CRNP, 75 mg at 88/38/14 2860    folic acid (FOLVITE) tablet 1 mg, 1 mg, Oral, Daily, Stevo Rise, CRNP, 1 mg at 01/22/21 8745    hydrOXYzine HCL (ATARAX) tablet 50 mg, 50 mg, Oral, TID PRN, Stevo Rise, CRNP, 50 mg at 01/21/21 2254    metoclopramide (REGLAN) injection 10 mg, 10 mg, Intravenous, Q6H PRN, Stevo Rise, CRNP    mirtazapine (REMERON) tablet 30 mg, 30 mg, Oral, HS, Stevo Rise, CRNP, 30 mg at 01/21/21 2004    multivitamin-minerals (CENTRUM) tablet 1 tablet, 1 tablet, Oral, Daily, CHELSI Schroeder, 1 tablet at 01/22/21 0825    nitroglycerin (NITROSTAT) SL tablet 0 4 mg, 0 4 mg, Sublingual, Q5 Min PRN, Sheral Macedo, CRNP    oxyCODONE (ROXICODONE) IR tablet 5 mg, 5 mg, Oral, Q6H PRN, Sheral Macedo, CRNP    senna (SENOKOT) tablet 17 2 mg, 2 tablet, Oral, Daily PRN, Sheral Macedo, CRNP    sodium chloride 0 9 % infusion, 250 mL/hr, Intravenous, Continuous, Shermarina AvendanoMacedo, CRNP, Last Rate: 250 mL/hr at 01/22/21 0345, 250 mL/hr at 01/22/21 0345    thiamine (VITAMIN B1) tablet 100 mg, 100 mg, Oral, Daily, Sheral Macedo, CRNP, 100 mg at 01/22/21 4814    This note was completed in part utilizing M-Modal Fluency Direct Software  Grammatical errors, random word insertions, spelling mistakes, and incomplete sentences may be an occasional consequence of this system secondary to software limitations, ambient noise, and hardware issues  If you have any questions or concerns about the content, text, or information contained within the body of this dictation, please contact the provider for clarification

## 2021-01-22 NOTE — ASSESSMENT & PLAN NOTE
CT head: Subacute left MCA territory infarction, no evidence of hemorrhagic conversion  Is on stroke pathway  Echo without regional wall motion abnormalities  Given timing of symptoms, no need for permissive hypertension per Neurology  Unfortunately patient was unable to fit in MRI  Continue aspirin, statin  Appreciate Neurology recommendations  ANA on Monday

## 2021-01-22 NOTE — UTILIZATION REVIEW
Initial Clinical Review    Admission: Date/Time/Statement:   Admission Orders (From admission, onward)     Ordered        01/21/21 0145  Inpatient Admission  Once                   Orders Placed This Encounter   Procedures    Inpatient Admission     Standing Status:   Standing     Number of Occurrences:   1     Order Specific Question:   Level of Care     Answer:   Med Surg [16]     Order Specific Question:   Estimated length of stay     Answer:   More than 2 Midnights     Order Specific Question:   Certification     Answer:   I certify that inpatient services are medically necessary for this patient for a duration of greater than two midnights  See H&P and MD Progress Notes for additional information about the patient's course of treatment  ED Arrival Information     Expected Arrival Acuity Means of Arrival Escorted By Service Admission Type    - 1/20/2021 22:49 Urgent Ambulance [facility] ([address]) General Medicine Urgent    Arrival Complaint    confusion        Chief Complaint   Patient presents with    Altered Mental Status     Pt arrives via EMS stating that he has been confused lately  Pt states "I think I've had a stroke " Reports L sided weakness  States he was lying on the floor for 3 days but can't explain why   Cough     Reports dry cough x 4 days      Assessment/Plan: 40 yo male presented to ED from home via EMS as inpatient admission for acute CVA  Patient confused and having difficulty explaining his story  He thinks he fall 3 days ago and laid on the ground all that time decreased right sided sensation, weakness,numbness confusion paresthesias and unstready gait noted  [address] psychiatric hx, drinks alcohol regularly, uses performance enhancing steroids and admits to taking percocets recreationally  Plan start ASA IV heparin drip, telemetry,neusr check consult cardiology and neurology and supportive care       ED Triage Vitals   Temperature Pulse Respirations Blood Pressure SpO2 01/20/21 2258 01/20/21 2256 01/20/21 2256 01/20/21 2256 01/20/21 2256   98 8 °F (37 1 °C) 85 20 (S) (!) 183/90 98 %      Temp Source Heart Rate Source Patient Position - Orthostatic VS BP Location FiO2 (%)   01/20/21 2258 01/20/21 2256 01/20/21 2256 01/20/21 2256 --   Oral Monitor Lying Right arm       Pain Score       01/21/21 0315       No Pain          Wt Readings from Last 1 Encounters:   01/21/21 (!) 148 kg (326 lb 4 5 oz)     Additional Vital Signs:   01/21/21 2218  97 7 °F (36 5 °C)  72  18  178/102Abnormal     98 %  None (Room air)  Lying   01/21/21 1532    71  18  144/91    98 %  None (Room air)  Lying   01/21/21 1203  97 6 °F (36 4 °C)  74  18  153/107Abnormal       None (Room air)  Lying   01/21/21 1000    74  18  141/91    98 %  None (Room air)  Lying   01/21/21 0618  97 4 °F (36 3 °C)Abnormal   91  18  194/93Abnormal     99 %       01/21/21 0515    70  20  189/88Abnormal   127  97 %       01/21/21 0415    80  20  172/90Abnormal     97 %       01/21/21 0315    79  20  158/96    97 %       01/21/21 0215    76  20  150/76  106  97 %  None (Room air)  Lying   01/21/21 0107    81  20  171/88Abnormal     97 %  None (Room air)         Pertinent Labs/Diagnostic Test Results:   Results from last 7 days   Lab Units 01/20/21  2351   SARS-COV-2  Negative     Results from last 7 days   Lab Units 01/21/21  0618 01/20/21  2332   WBC Thousand/uL 9 77 9 45   HEMOGLOBIN g/dL 9 0* 10 1*   HEMATOCRIT % 32 3* 35 5*   PLATELETS Thousands/uL 251 295   NEUTROS ABS Thousands/µL 6 32 7 10         Results from last 7 days   Lab Units 01/21/21  0618 01/20/21  2332   SODIUM mmol/L 142 141   POTASSIUM mmol/L 4 0 3 8   CHLORIDE mmol/L 105 102   CO2 mmol/L 30 31   ANION GAP mmol/L 7 8   BUN mg/dL 17 23   CREATININE mg/dL 1 19 1 34*   EGFR ml/min/1 73sq m 75 65   CALCIUM mg/dL 8 0* 9 0   MAGNESIUM mg/dL 2 1  --      Results from last 7 days   Lab Units 01/21/21  0618 01/20/21  2332   AST U/L 743* 1,099*   ALT U/L 2,552* 3,144*   ALK PHOS U/L 59 70   TOTAL PROTEIN g/dL 6 7 7 6   ALBUMIN g/dL 2 9* 3 3*   TOTAL BILIRUBIN mg/dL 0 94 1 01*         Results from last 7 days   Lab Units 01/21/21  0618 01/20/21  2332   GLUCOSE RANDOM mg/dL 83 89         Results from last 7 days   Lab Units 01/21/21  0618   HEMOGLOBIN A1C % 5 8*   EAG mg/dl 120     Results from last 7 days   Lab Units 01/21/21  0810 01/20/21  2332   CK TOTAL U/L 5,776* 17,316*   CK MB INDEX % <1 0 <1 0   CK MB ng/mL 9 1* 20 2*     Results from last 7 days   Lab Units 01/21/21  0618 01/21/21  0238 01/20/21  2332   TROPONIN I ng/mL 7 76* 10 00* 9 33*         Results from last 7 days   Lab Units 01/22/21  1206 01/21/21  1628 01/21/21  0810 01/21/21  0104   PROTIME seconds  --   --   --  16 5*   INR   --   --   --  1 36*   PTT seconds 29 86* 80* 35     Results from last 7 days   Lab Units 01/21/21  0618   FERRITIN ng/mL 29     Results from last 7 days   Lab Units 01/21/21  0319   HEP B S AG  Non-reactive   HEP C AB  Non-reactive   HEP B C IGM  Non-reactive     Results from last 7 days   Lab Units 01/21/21  0045   LIPASE u/L 104             Results from last 7 days   Lab Units 01/21/21  0145   CLARITY UA  Clear   COLOR UA  Yellow   SPEC GRAV UA  1 015   PH UA  7 0   GLUCOSE UA mg/dl Negative   KETONES UA mg/dl Negative   BLOOD UA  Trace*   PROTEIN UA mg/dl Negative   NITRITE UA  Negative   BILIRUBIN UA  Negative   UROBILINOGEN UA E U /dl 1 0   LEUKOCYTES UA  Negative   WBC UA /hpf 0-1*   RBC UA /hpf None Seen   BACTERIA UA /hpf None Seen   EPITHELIAL CELLS WET PREP /hpf Occasional     Results from last 7 days   Lab Units 01/20/21  2351   INFLUENZA A PCR  Negative   INFLUENZA B PCR  Negative   RSV PCR  Negative         Results from last 7 days   Lab Units 01/21/21  0107   AMPH/METH  Negative   BARBITURATE UR  Negative   BENZODIAZEPINE UR  Positive*   COCAINE UR  Negative   METHADONE URINE  Negative   OPIATE UR  Negative   PCP UR  Negative   THC UR  Negative     Results from last 7 days   Lab Units 01/21/21  0045   ETHANOL LVL mg/dL <3   ACETAMINOPHEN LVL ug/mL <2*   SALICYLATE LVL mg/dL <3*       EKG 01-22-21    US RIGHT UQ  01-22-21  Evidence of hepatic steatosis with sparing adjacent to the portal vein  No gallstones   Normal ducts  ECHO 01-21-21  Ejection fraction was estimated to be 60 %    CTA HEAD AND NECK  01-21-21  Subacute left MCA territory infarction, as described above   No evidence of hemorrhagic conversion   Neurology consultation and follow-up is recommended  CT HEAD  01-21-21  Subacute left MCA territory infarction, as described above   No evidence of hemorrhagic conversion   Neurology consultation and follow-up is recommended  CT A/P 01-21-21  No evidence of acute intrathoracic, intra-abdominal or intrapelvic parenchymal organ injury   No pneumothorax  There is a moderate to large hiatal hernia  Bilateral gynecomastia  CT C-SPINE 01-21-21     No cervical spine fracture or traumatic malalignment   There is mild gentle reversal of cervical lordosis   Mild degenerative changes are present           EKG 01-20-21  X 3   Normal sinus rhythm  T wave abnormality, consider inferior ischemia  T wave abnormality, consider anterolateral ischemia  Abnormal ECG  T Wave Axis degrees -63   & - 43         ED Treatment:   Medication Administration from 01/20/2021 2249 to 01/21/2021 1845       Date/Time Order Dose Route Action     01/20/2021 2331 tetanus-diphtheria-acellular pertussis (BOOSTRIX) IM injection 0 5 mL 0 5 mL Intramuscular Given     01/20/2021 2330 multi-electrolyte (ISOLYTE-S PH 7 4) bolus 2,000 mL 2,000 mL Intravenous New Bag     01/20/2021 2351 thiamine (VITAMIN B1) 500 mg in sodium chloride 0 9 % 50 mL IVPB 500 mg Intravenous New Bag     69/26/9005 9744 folic acid 1 mg in sodium chloride 0 9 % 50 mL IVPB 1 mg Intravenous New Bag     01/21/2021 0052 sodium chloride 0 9 % infusion 100 mL/hr Intravenous New Bag     01/21/2021 0046 iohexol (OMNIPAQUE) 350 MG/ML injection (MULTI-DOSE) 100 mL 100 mL Intravenous Given     01/21/2021 0120 aspirin tablet 325 mg 325 mg Oral Given     01/21/2021 0225 heparin (porcine) injection 4,000 Units 4,000 Units Intravenous Given     01/21/2021 1550 heparin (porcine) 25,000 units in 0 45% NaCl 250 mL infusion (premix) 11 1 Units/kg/hr Intravenous Restarted     01/21/2021 0226 heparin (porcine) 25,000 units in 0 45% NaCl 250 mL infusion (premix) 11 1 Units/kg/hr Intravenous New Bag     01/21/2021 1809 sodium chloride 0 9 % infusion 250 mL/hr Intravenous New Bag     01/21/2021 0318 sodium chloride 0 9 % infusion 250 mL/hr Intravenous New Bag     01/21/2021 0315 clopidogrel (PLAVIX) tablet 600 mg 600 mg Oral Given     01/21/2021 1759 atorvastatin (LIPITOR) tablet 40 mg 40 mg Oral Given     01/21/2021 0941 aspirin (ECOTRIN LOW STRENGTH) EC tablet 81 mg 81 mg Oral Given     01/21/2021 0941 thiamine (VITAMIN B1) tablet 100 mg 100 mg Oral Given     67/63/0828 8342 folic acid (FOLVITE) tablet 1 mg 1 mg Oral Given     01/21/2021 0941 multivitamin-minerals (CENTRUM) tablet 1 tablet 1 tablet Oral Given     01/21/2021 0925 iohexol (OMNIPAQUE) 350 MG/ML injection (SINGLE-DOSE) 100 mL 85 mL Intravenous Given     01/21/2021 1138 perflutren lipid microsphere (DEFINITY) injection 0 8 mL/min Intravenous Given        Past Medical History:   Diagnosis Date    High blood pressure      Present on Admission:   NSTEMI (non-ST elevated myocardial infarction) ([facility] [address] )   Acute CVA (cerebrovascular accident) ([facility] [address] )   Alcohol abuse   Drug abuse ([facility] [address] )   Acute liver failure without hepatic coma   Microcytic anemia   Elevated serum creatinine   Hypertensive urgency   Rhabdomyolysis   MDD (major depressive disorder), recurrent episode, moderate (HCC)   Super obese      Admitting Diagnosis: Cough [R05]  Rhabdomyolysis [M62 82]  Altered mental status [R41 82]  Elevated troponin [R77 8]  Elevated LFTs [R79 89]  NSTEMI (non-ST elevated myocardial infarction) ([place] [address] ) [I21 4]  Acute ischemic left MCA stroke ([place] [address] ) [I63 512]  REANNA (acute kidney injury) ([place] [address] ) [N17 9]  Acute CVA (cerebrovascular accident) ([place] [address] ) [I63 9]  Acute liver failure without hepatic coma [K72 00]  Age/Sex: 41 y o  male  Admission Orders:  Scheduled Medications:  amLODIPine, 5 mg, Oral, Daily  aspirin, 81 mg, Oral, Daily  atorvastatin, 40 mg, Oral, QPM  clopidogrel, 75 mg, Oral, Daily  enoxaparin, 30 mg, Subcutaneous, R53X MONICA  folic acid, 1 mg, Oral, Daily  iron sucrose, 200 mg, Intravenous, Daily  mirtazapine, 30 mg, Oral, HS  multivitamin-minerals, 1 tablet, Oral, Daily  thiamine, 100 mg, Oral, Daily      Continuous IV Infusions:  sodium chloride, 250 mL/hr, Intravenous, Continuous      PRN Meds:  acetaminophen, 650 mg, Oral, Q4H PRN  aluminum-magnesium hydroxide-simethicone, 30 mL, Oral, Q6H PRN  hydrOXYzine HCL, 50 mg, Oral, TID PRN  Labetalol HCl, 10 mg, Intravenous, Q6H PRN  metoclopramide, 10 mg, Intravenous, Q6H PRN  nitroglycerin, 0 4 mg, Sublingual, Q5 Min PRN  oxyCODONE, 5 mg, Oral, Q6H PRN  senna, 2 tablet, Oral, Daily PRN        IP CONSULT TO NEUROLOGY  IP CONSULT TO CASE MANAGEMENT  IP CONSULT TO NUTRITION SERVICES  IP CONSULT TO GASTROENTEROLOGY  IP CONSULT TO CARDIOLOGY   CIWA scoring   ( 0-4)  Neuro and VS q 1 hr x 4  q 2 hrs x 4 than q 4 hrs x 72 hours   PT/OT  SCD  Telemetry  ST segment monitoring      Network Utilization Review Department  ATTENTION: Please call with any questions or concerns to [phone] and carefully listen to the prompts so that you are directed to the right person  All voicemails are confidential   Qiana Garcia all requests for admission clinical reviews, approved or denied determinations and any other requests to dedicated fax number below belonging to the campus where the patient is receiving treatment   List of dedicated fax numbers for the Facilities:  FACILITY NAME UR FAX NUMBER   ADMISSION DENIALS (Administrative/Medical Necessity) [phone]   [facility] [address] (Maternity/NICU/Pediatrics) [facility] [address] [phone]   [address] (  [address]) [address] [place] [address] [address] Juliana Owens [facility] [phone]   [place] [address] [phone]

## 2021-01-22 NOTE — PROGRESS NOTES
[facility] - Inpatient Progress Note  Ibis Petty 41 y o  male MRN: [id number]  Encounter: [id number]          ASSESSMENT AND PLAN:      1  Abnormal LFTs:  Patient presenting with AST elevation to 1099, ALT elevation to 3144, with T bili of 1 01  LFTs continue to downtrend today  LFTs abnormal more of a hepatocellular pattern rather than obstructive pattern  Suspect that abnormal LFTs likely secondary to alcoholic hepatitis with possible degree of shock liver in the setting of being down, and potentially hypotensive at home, although no documented hypotension since patient has been in the hospital (patient has been markedly hypertensive in the hospital)  Additionally, could potentially have some degree of DILI 2/2 reported heavy vitamin/supplement use, though exact amounts and supplement types are unclear  Tylenol level <2  Hepatitis panel negative  Mentating appropriately present time  Right upper quadrant ultrasound showing hepatic steatosis, no obstructive process  - trend LFTs/INR daily; awaiting INR result from AM labs ordered  - monitor mental status closely  - avoid hepatotoxic medications  - supportive care with IVF  - discussed the crucial nature of ETOH and polysubstance/supplement discontinuation  - healthy weight loss and ETOH abstinence discussed for hepatic steatosis to prevent progression to cirrhosis     ______________________________________________________________________    SUBJECTIVE:  Patient denies any abdominal pain  Patient reports he is having normal bowel movements  Discusses goals to attend AA and quit alcohol use        Historical Information   Past Medical History:   Diagnosis Date    High blood pressure      Past Surgical History:   Procedure Laterality Date    HERNIA REPAIR       Social History   Social History     Substance and Sexual Activity   Alcohol Use Yes    Comment: states "Im trying to quit"      Social History     Substance and Sexual Activity   Drug Use Yes    Comment: rakesh      Social History     Tobacco Use   Smoking Status Unknown If Ever Smoked   Smokeless Tobacco Current User    Types: Chew     History reviewed  No pertinent family history      Meds/Allergies       Current Facility-Administered Medications:     acetaminophen (TYLENOL) tablet 650 mg, 650 mg, Oral, Q4H PRN    aluminum-magnesium hydroxide-simethicone (MYLANTA) oral suspension 30 mL, 30 mL, Oral, Q6H PRN    amLODIPine (NORVASC) tablet 5 mg, 5 mg, Oral, Daily, 5 mg at 01/22/21 1339    aspirin (ECOTRIN LOW STRENGTH) EC tablet 81 mg, 81 mg, Oral, Daily, 81 mg at 01/22/21 0825    atorvastatin (LIPITOR) tablet 40 mg, 40 mg, Oral, QPM, 40 mg at 01/21/21 1759    [COMPLETED] clopidogrel (PLAVIX) tablet 600 mg, 600 mg, Oral, Once, 600 mg at 01/21/21 0315 **AND** clopidogrel (PLAVIX) tablet 75 mg, 75 mg, Oral, Daily, 75 mg at 01/22/21 0825    enoxaparin (LOVENOX) subcutaneous injection 30 mg, 30 mg, Subcutaneous, Q12H [address], 30 mg at 55/75/85 5888    folic acid (FOLVITE) tablet 1 mg, 1 mg, Oral, Daily, 1 mg at 01/22/21 0826    hydrOXYzine HCL (ATARAX) tablet 50 mg, 50 mg, Oral, TID PRN, 50 mg at 01/21/21 2254    iron sucrose (VENOFER) 200 mg in sodium chloride 0 9 % 100 mL IVPB, 200 mg, Intravenous, Daily, 200 mg at 01/22/21 1338    Labetalol HCl (NORMODYNE) injection 10 mg, 10 mg, Intravenous, Q6H PRN    metoclopramide (REGLAN) injection 10 mg, 10 mg, Intravenous, Q6H PRN    mirtazapine (REMERON) tablet 30 mg, 30 mg, Oral, HS, 30 mg at 01/21/21 2254    multivitamin-minerals (CENTRUM) tablet 1 tablet, 1 tablet, Oral, Daily, 1 tablet at 01/22/21 0825    nitroglycerin (NITROSTAT) SL tablet 0 4 mg, 0 4 mg, Sublingual, Q5 Min PRN    oxyCODONE (ROXICODONE) IR tablet 5 mg, 5 mg, Oral, Q6H PRN    senna (SENOKOT) tablet 17 2 mg, 2 tablet, Oral, Daily PRN    sodium chloride 0 9 % infusion, 150 mL/hr, Intravenous, Continuous, 150 mL/hr at 01/22/21 1337    thiamine (VITAMIN B1) tablet 100 mg, 100 mg, Oral, Daily, 100 mg at 01/22/21 0826    No Known Allergies    Objective     Blood pressure 162/86, pulse 71, temperature 98 °F (36 7 °C), temperature source Temporal, resp  rate 18, height 5' 9" (1 753 m), weight (!) 148 kg (326 lb 4 5 oz), SpO2 95 %  Body mass index is 48 18 kg/m²        PHYSICAL EXAM:      General Appearance:   Alert, cooperative, no distress   HEENT:   Normocephalic, atraumatic, anicteric    Neck:  Supple, symmetrical, trachea midline   Lungs:   Clear to auscultation bilaterally; respirations even and unlabored   Heart:   Regular rate and rhythm; +S1/+S2   Abdomen:   Soft, obese, non-tender, non-distended; normal bowel sounds; no masses, no organomegaly    Genitalia:   Deferred    Rectal:   Deferred    Extremities:  No cyanosis, clubbing or edema    Pulses:  2+ and symmetric    Skin:  No jaundice, rashes, or lesions visualized          Lab Results:   Admission on 01/20/2021   Component Date Value    Ventricular Rate 01/20/2021 85     Atrial Rate 01/20/2021 85     NV Interval 01/20/2021 150     QRSD Interval 01/20/2021 94     QT Interval 01/20/2021 360     QTC Interval 01/20/2021 428     P Axis 01/20/2021 45     QRS Axis 01/20/2021 75     T Wave Axis 01/20/2021 -63     Troponin I 01/20/2021 9 33*    WBC 01/20/2021 9 45     RBC 01/20/2021 5 12     Hemoglobin 01/20/2021 10 1*    Hematocrit 01/20/2021 35 5*    MCV 01/20/2021 69*    MCH 01/20/2021 19 7*    MCHC 01/20/2021 28 5*    RDW 01/20/2021 17 6*    MPV 01/20/2021 10 3     Platelets 27/35/8560 295     nRBC 01/20/2021 0     Neutrophils Relative 01/20/2021 75     Immat GRANS % 01/20/2021 0     Lymphocytes Relative 01/20/2021 15     Monocytes Relative 01/20/2021 9     Eosinophils Relative 01/20/2021 1     Basophils Relative 01/20/2021 0     Neutrophils Absolute 01/20/2021 7 10     Immature Grans Absolute 01/20/2021 0 03     Lymphocytes Absolute 01/20/2021 1 40     Monocytes Absolute 01/20/2021 0 83     Eosinophils Absolute 01/20/2021 0 06     Basophils Absolute 01/20/2021 0 03     Sodium 01/20/2021 141     Potassium 01/20/2021 3 8     Chloride 01/20/2021 102     CO2 01/20/2021 31     ANION GAP 01/20/2021 8     BUN 01/20/2021 23     Creatinine 01/20/2021 1 34*    Glucose 01/20/2021 89     Calcium 01/20/2021 9 0     Corrected Calcium 01/20/2021 9 6     AST 01/20/2021 1,099*    ALT 01/20/2021 3,144*    Alkaline Phosphatase 01/20/2021 70     Total Protein 01/20/2021 7 6     Albumin 01/20/2021 3 3*    Total Bilirubin 01/20/2021 1 01*    eGFR 01/20/2021 65     Total CK 01/20/2021 17,316*    Amph/Meth UR 01/21/2021 Negative     Barbiturate Ur 01/21/2021 Negative     Benzodiazepine Urine 01/21/2021 Positive*    Cocaine Urine 01/21/2021 Negative     Methadone Urine 01/21/2021 Negative     Opiate Urine 01/21/2021 Negative     PCP Ur 01/21/2021 Negative     THC Urine 01/21/2021 Negative     Oxycodone Urine 01/21/2021 Negative     SARS-CoV-2 01/20/2021 Negative     INFLUENZA A PCR 01/20/2021 Negative     INFLUENZA B PCR 01/20/2021 Negative     RSV PCR 01/20/2021 Negative     Lipase 01/21/2021 104     Ethanol Lvl 29/72/7650 <3     Salicylate Lvl 16/97/8994 <3*    Acetaminophen Level 01/21/2021 <2*    PTT 01/21/2021 35     Protime 01/21/2021 16 5*    INR 01/21/2021 1 36*    Color, UA 01/21/2021 Yellow     Clarity, UA 01/21/2021 Clear     pH, UA 01/21/2021 7 0     Leukocytes, UA 01/21/2021 Negative     Nitrite, UA 01/21/2021 Negative     Protein, UA 01/21/2021 Negative     Glucose, UA 01/21/2021 Negative     Ketones, UA 01/21/2021 Negative     Urobilinogen, UA 01/21/2021 1 0     Bilirubin, UA 01/21/2021 Negative     Blood, UA 01/21/2021 Trace*    Specific Gravity, UA 01/21/2021 1 015     RBC, UA 01/21/2021 None Seen     WBC, UA 01/21/2021 0-1*    Epithelial Cells 01/21/2021 Occasional     Bacteria, UA 01/21/2021 None Seen     CK-MB Index 01/20/2021 <1 0     CK-MB 01/20/2021 20 2*    Troponin I 01/21/2021 10 00*    Hepatitis B Surface Ag 01/21/2021 Non-reactive     Hep A IgM 01/21/2021 Non-reactive     Hepatitis C Ab 01/21/2021 Non-reactive     Hep B C IgM 01/21/2021 Non-reactive     Vitamin B-12 01/21/2021 2,191*    Folate 01/21/2021 >20 0*    Troponin I 01/21/2021 7 76*    Cholesterol 01/21/2021 87     Triglycerides 01/21/2021 80     HDL, Direct 01/21/2021 25*    LDL Calculated 01/21/2021 46     Hemoglobin A1C 01/21/2021 5 8*    EAG 01/21/2021 120     Sodium 01/21/2021 142     Potassium 01/21/2021 4 0     Chloride 01/21/2021 105     CO2 01/21/2021 30     ANION GAP 01/21/2021 7     BUN 01/21/2021 17     Creatinine 01/21/2021 1 19     Glucose 01/21/2021 83     Calcium 01/21/2021 8 0*    Corrected Calcium 01/21/2021 8 9     AST 01/21/2021 743*    ALT 01/21/2021 2,552*    Alkaline Phosphatase 01/21/2021 59     Total Protein 01/21/2021 6 7     Albumin 01/21/2021 2 9*    Total Bilirubin 01/21/2021 0 94     eGFR 01/21/2021 75     WBC 01/21/2021 9 77     RBC 01/21/2021 4 62     Hemoglobin 01/21/2021 9 0*    Hematocrit 01/21/2021 32 3*    MCV 01/21/2021 70*    MCH 01/21/2021 19 5*    MCHC 01/21/2021 27 9*    RDW 01/21/2021 17 8*    MPV 01/21/2021 10 2     Platelets 19/23/9941 251     nRBC 01/21/2021 0     Neutrophils Relative 01/21/2021 63     Immat GRANS % 01/21/2021 1     Lymphocytes Relative 01/21/2021 23     Monocytes Relative 01/21/2021 10     Eosinophils Relative 01/21/2021 2     Basophils Relative 01/21/2021 1     Neutrophils Absolute 01/21/2021 6 32     Immature Grans Absolute 01/21/2021 0 05     Lymphocytes Absolute 01/21/2021 2 24     Monocytes Absolute 01/21/2021 0 96     Eosinophils Absolute 01/21/2021 0 15     Basophils Absolute 01/21/2021 0 05     Magnesium 01/21/2021 2 1     Ventricular Rate 01/21/2021 73     Atrial Rate 01/21/2021 73     MT Interval 01/21/2021 148     QRSD Interval 01/21/2021 92     QT Interval 01/21/2021 382     QTC Interval 01/21/2021 420     P Axis 01/21/2021 53     QRS Axis 01/21/2021 53     T Wave Axis 01/21/2021 -62     Ventricular Rate 01/21/2021 70     Atrial Rate 01/21/2021 70     FL Interval 01/21/2021 150     QRSD Interval 01/21/2021 96     QT Interval 01/21/2021 396     QTC Interval 01/21/2021 427     P Axis 01/21/2021 25     QRS Axis 01/21/2021 63     T Wave Axis 01/21/2021 -49     Total CK 01/21/2021 5,776*    PTT 01/21/2021 80*    Iron Saturation 01/21/2021 6     TIBC 01/21/2021 359     Iron 01/21/2021 23*    Ferritin 01/21/2021 29     CK-MB Index 01/21/2021 <1 0     CK-MB 01/21/2021 9 1*    PTT 01/21/2021 86*    NT-proBNP 01/22/2021 148*    Sodium 01/22/2021 139     Potassium 01/22/2021 3 8     Chloride 01/22/2021 106     CO2 01/22/2021 27     ANION GAP 01/22/2021 6     BUN 01/22/2021 12     Creatinine 01/22/2021 1 01     Glucose 01/22/2021 111     Calcium 01/22/2021 8 6     eGFR 01/22/2021 91     Magnesium 01/22/2021 1 8     PTT 01/22/2021 29     Total Bilirubin 01/22/2021 0 83     Bilirubin, Direct 01/22/2021 0 26*    Alkaline Phosphatase 01/22/2021 63     AST 01/22/2021 266*    ALT 01/22/2021 1,834*    Total Protein 01/22/2021 7 3     Albumin 01/22/2021 3 0*         Radiology Results:   Cta Head And Neck W Wo Contrast    Result Date: 1/21/2021  Narrative: CTA NECK AND BRAIN WITH AND WITHOUT CONTRAST INDICATION: Neuro deficit, acute, stroke suspected Stroke COMPARISON:   CT performed 9 hours earlier TECHNIQUE:  Routine CT imaging of the Brain without contrast   Post contrast imaging was performed after administration of iodinated contrast through the neck and brain  Post contrast axial 0 625 mm images timed to opacify the arterial system  3D rendering was performed on an independent workstation  MIP reconstructions performed  Coronal reconstructions were performed of the noncontrast portion of the brain    Radiation dose length product (DLP) for this visit:  1846 mGy-cm   This examination, like all CT scans performed in the [facility], was performed utilizing techniques to minimize radiation dose exposure, including the use of iterative reconstruction and automated exposure control  IV Contrast:  85 mL of iohexol (OMNIPAQUE)  IMAGE QUALITY:   CTA images are degraded by quantum mottle through the level of the shoulders  Both V1 segments and proximal V2 segments poorly depicted  The vessels appear patent  FINDINGS: NONCONTRAST BRAIN PARENCHYMA:  Stable ill-defined hypodensity in the left posterior parietal parenchyma, and a discontiguous focus in the more anterior posterior left frontal parenchyma, both consistent with recent ischemia  No overt hemorrhage  Findings similar in appearance to most recent exam   Minimal local mass effect  VENTRICLES AND EXTRA-AXIAL SPACES:  Normal for the patient's age  VISUALIZED ORBITS AND PARANASAL SINUSES:  Unremarkable  CERVICAL VASCULATURE AORTIC ARCH AND GREAT VESSELS:  Normal aortic arch and great vessel origins  Normal visualized subclavian vessels  RIGHT VERTEBRAL ARTERY CERVICAL SEGMENT:  Normal origin  The vessel is normal in caliber throughout the neck  Segments of the V1 and proximal V2 are degraded by quantum mottle  LEFT VERTEBRAL ARTERY CERVICAL SEGMENT:  Normal origin  The vessel is normal in caliber throughout the neck  Segments of the V1 and proximal V2 are degraded by quantum mottle RIGHT EXTRACRANIAL CAROTID SEGMENT:  Mild atherosclerotic disease of the distal common carotid artery and proximal cervical internal carotid artery without significant stenosis compared to the more distal ICA  LEFT EXTRACRANIAL CAROTID SEGMENT:  Mild atherosclerotic disease of the distal common carotid artery and proximal cervical internal carotid artery without significant stenosis compared to the more distal ICA   NASCET criteria was used to determine the degree of internal carotid artery diameter stenosis  INTRACRANIAL VASCULATURE INTERNAL CAROTID ARTERIES:  Normal enhancement of the intracranial portions of the internal carotid arteries  Normal ophthalmic artery origins  Normal ICA terminus  Bilateral posterior communicating artery are patent ANTERIOR CIRCULATION:  Left A1 is dominant, A1 segments and anterior cerebral arteries with normal enhancement  Normal anterior communicating artery  MIDDLE CEREBRAL ARTERY CIRCULATION:  M1 segment and middle cerebral artery branches demonstrate normal enhancement bilaterally  Subtle underfilling of the distal MCA branches on the left  No focal clot or proximal obstructive disease is noted  DISTAL VERTEBRAL ARTERIES:  Normal distal vertebral arteries  Right AICA/PICA and left Posterior inferior cerebellar artery origins are normal  Normal vertebral basilar junction  BASILAR ARTERY:  Basilar artery is normal in caliber  Normal superior cerebellar arteries  POSTERIOR CEREBRAL ARTERIES: Both posterior cerebral arteries demonstrate normal enhancement  Normal posterior communicating arteries  DURAL VENOUS SINUSES:  Normal  NON VASCULAR ANATOMY BONY STRUCTURES:  No acute osseous abnormality  SOFT TISSUES OF THE NECK:  Tiny hypodense nodule right thyroid, based on size in a supine patient, no further evaluation is warranted  THORACIC INLET:  Unremarkable  Impression: Stable subacute ischemia left MCA distribution, embolic in nature based on discontiguous foci of involvement  No nancy hemorrhage, no change in minimal mass effect  No large vessel flow restrictive disease within the head or neck  Slightly diminished distal left MCA branches  Workstation performed: [id number]     Ct Head Without Contrast    Result Date: 1/21/2021  Narrative: CT BRAIN - WITHOUT CONTRAST INDICATION:   ams  Altered mental status, confusion, decreased sensation on the right, word finding difficulties, patient stated " I think I had a stroke"    Symptoms began approximately 3 days ago  NSTEMI, troponin 9 33  Transaminitis  History provided by Dr Kellen Aquino  COMPARISON:  None available  TECHNIQUE:  CT examination of the brain was performed  In addition to axial images, sagittal and coronal 2D reformatted images were created and submitted for interpretation  Radiation dose length product (DLP) for this visit:  929 mGy-cm   This examination, like all CT scans performed in the [facility], was performed utilizing techniques to minimize radiation dose exposure, including the use of iterative reconstruction and automated exposure control  IMAGE QUALITY:  Diagnostic  FINDINGS: PARENCHYMA:  There is a relatively large area of abnormal hypodensity and loss of gray-white differentiation involving the left posterior frontoparietal region  A smaller area of abnormal hypodensity and loss of gray-white differentiation is also evident more anteriorly in the left frontal lobe, near the convexity  These findings are most suggestive of subacute left MCA territory infarction  There is no evidence of hemorrhagic conversion or acute intracranial hemorrhage  There is no midline shift  VENTRICLES AND EXTRA-AXIAL SPACES:  There is no hydrocephalus  The basilar cisterns are patent  VISUALIZED ORBITS AND PARANASAL SINUSES:  There are retention cysts and mucosal thickening in the right maxillary sinus  There is mild mucosal thickening of the ethmoids  The orbits appear unremarkable  CALVARIUM AND EXTRACRANIAL SOFT TISSUES:  Normal      Impression: Subacute left MCA territory infarction, as described above  No evidence of hemorrhagic conversion  Neurology consultation and follow-up is recommended  I personally discussed this study with DR Brenna Bang on 1/21/2021 at 12:59 AM  Workstation performed: [id number]     Ct Spine Cervical Without Contrast    Result Date: 1/21/2021  Narrative: CT CERVICAL SPINE - WITHOUT CONTRAST INDICATION:   ams fall  COMPARISON:  None available   TECHNIQUE: CT examination of the cervical spine was performed without intravenous contrast   Contiguous axial images were obtained  Sagittal and coronal reconstructions were performed  Radiation dose length product (DLP) for this visit:  714 mGy-cm   This examination, like all CT scans performed in the [facility], was performed utilizing techniques to minimize radiation dose exposure, including the use of iterative reconstruction and automated exposure control  IMAGE QUALITY:  Diagnostic  FINDINGS: ALIGNMENT:  There is mild gentle reversal of cervical lordosis  There is no subluxation  VERTEBRAL BODIES:  No fracture  DEGENERATIVE CHANGES:  Mild degenerative changes are present  PREVERTEBRAL AND PARASPINAL SOFT TISSUES:  Unremarkable  THORACIC INLET:  Please refer to the concurrent chest, abdomen, and pelvic CT report for description of the thoracic inlet findings  Impression: No cervical spine fracture or traumatic malalignment  There is mild gentle reversal of cervical lordosis  Mild degenerative changes are present  Workstation performed: [id number]     Ct Chest Abdomen Pelvis W Contrast    Result Date: 1/21/2021  Narrative: CT CHEST, ABDOMEN AND PELVIS WITH IV CONTRAST INDICATION:   ams  Subacute stroke, NSTEMI, troponin 9 33  Transaminitis  Possible fall  COMPARISON:  None available  TECHNIQUE: CT examination of the chest, abdomen and pelvis was performed  Axial, sagittal, and coronal 2D reformatted images were created from the source data and submitted for interpretation  Radiation dose length product (DLP) for this visit:  1853 mGy-cm   This examination, like all CT scans performed in the [facility], was performed utilizing techniques to minimize radiation dose exposure, including the use of iterative reconstruction and automated exposure control  IV Contrast:  100 mL of iohexol (OMNIPAQUE) Enteric Contrast: Enteric contrast was not administered   FINDINGS: CHEST LUNGS: Lungs are clear  There is no tracheal or endobronchial lesion  PLEURA:  Unremarkable  HEART/GREAT VESSELS:  Unremarkable for patient's age  MEDIASTINUM AND BRODERICK:  There is a moderate to large hiatal hernia  CHEST WALL AND LOWER NECK:   Bilateral gynecomastia  ABDOMEN LIVER/BILIARY TREE:  Unremarkable  GALLBLADDER:  No calcified gallstones  No pericholecystic inflammatory change  SPLEEN:  Unremarkable  PANCREAS:  Unremarkable  ADRENAL GLANDS:  Unremarkable  KIDNEYS/URETERS:  Unremarkable  No hydronephrosis  STOMACH AND BOWEL:  There is a moderate to large hiatal hernia  There is no evidence of bowel obstruction  APPENDIX:  A normal appendix was visualized  ABDOMINOPELVIC CAVITY:  No ascites  No pneumoperitoneum  No lymphadenopathy  VESSELS:  Unremarkable for patient's age  PELVIS REPRODUCTIVE ORGANS:  Unremarkable for patient's age  URINARY BLADDER:  Unremarkable  ABDOMINAL WALL/INGUINAL REGIONS:  Tiny fat-containing umbilical hernia  Small fat-containing left inguinal hernia  OSSEOUS STRUCTURES:  No acute fracture or destructive osseous lesion  Impression: No evidence of acute intrathoracic, intra-abdominal or intrapelvic parenchymal organ injury  No pneumothorax  There is a moderate to large hiatal hernia  Bilateral gynecomastia  I personally discussed this study with DR Brenna Bang on 1/21/2021 at 12:59 AM  Workstation performed: [id number]     Us Right Upper Quadrant    Result Date: 1/22/2021  Narrative: RIGHT UPPER QUADRANT ULTRASOUND INDICATION:     abnormal LFTs  COMPARISON:  CT from 1/21/2020 TECHNIQUE:   Real-time ultrasound of the right upper quadrant was performed with a curvilinear transducer with both volumetric sweeps and still imaging techniques  FINDINGS: PANCREAS:  The pancreas is partially obscured by bowel gas  AORTA AND IVC:  Visualized portions are normal for patient age  LIVER: Size:  Mildly enlarged  The liver measures 18 5 cm in the midclavicular line   Contour:  Surface contour is smooth  Parenchyma: There is moderate diffuse increased echogenicity with smooth echotexture and acoustic beam attenuation  Most consistent with moderate hepatic steatosis  Heterogeneous areas are related to body habitus and technical factors  There appears to be an area of focal fatty sparing anterior to the portal vein is typical location  This area is slightly denser on the prior CT  No evidence of suspicious mass  Limited imaging of the main portal vein shows it to be patent and hepatopetal   BILIARY: No gallbladder findings  No intrahepatic biliary dilatation  CBD measures 4 mm  No choledocholithiasis  KIDNEY: Right kidney measures 13 0 cm  Within normal limits  ASCITES:   None  Impression: Evidence of hepatic steatosis with sparing adjacent to the portal vein  No gallstones  Normal ducts  Workstation performed: [id number]       The patient was seen and examined by Dr Gabriel Foster, all jeff medical decisions were made with Dr Gabriel Foster  Thank you for allowing us to participate in the care of this pleasant patient  We will follow up with you closely

## 2021-01-22 NOTE — PROGRESS NOTES
Progress Note - Iraida Fallon 1980, 41 y o  male MRN: [id number]    Unit/Bed#: E4 -01 Encounter: [id number]    Primary Care Provider: No primary care provider on file     Date and time admitted to hospital: 1/20/2021 10:49 PM        * Acute CVA (cerebrovascular accident) [facility])  Assessment & Plan  CT head: Subacute left MCA territory infarction, no evidence of hemorrhagic conversion  Is on stroke pathway  Echo without regional wall motion abnormalities  Given timing of symptoms, no need for permissive hypertension per Neurology  Unfortunately patient was unable to fit in MRI  Continue aspirin, statin  Appreciate Neurology recommendations    Super obese  Assessment & Plan  Weight loss, dietary and lifestyle modifications    Rhabdomyolysis  Assessment & Plan  Continue IV fluids  Monitor CK    Hypertensive urgency  Assessment & Plan  Start amlodipine 5 mg daily   Prn labetalol       Elevated serum creatinine  Assessment & Plan  Creatinine 1 3  Possibly due to increased muscle mass in setting of steroid use  Continue IV fluids continue to monitor      Microcytic anemia  Assessment & Plan  Microcytic anemia with iron deficiency anemia  Likely secondary to poor nutrition in setting of alcoholism  Will provide IV iron    Acute liver failure without hepatic coma  Assessment & Plan  Likely related to ischemic hepatitis  Ultrasound with hepatic steatosis  LFTs trending down  Continue to monitor  Appreciate GI recommendations    Drug abuse ([address] )  Assessment & Plan  Reports taking Percocet tablets and anabolic steroids  Host referral    Alcohol abuse  Assessment & Plan  CIWA protocol  Continue thiamine and folate    NSTEMI (non-ST elevated myocardial infarction) (HCC)  Assessment & Plan  Troponin peaked at 10 mg  EKG with nonspecific changes  No anginal symptoms  Likely secondary to rhabdomyolysis  Cardiology evaluated, no further ischemic evaluation    MDD (major depressive disorder), recurrent episode, moderate [facility]  Assessment & Plan  Continue home Remeron and Atarax    VTE Pharmacologic Prophylaxis:  Lovenox  Pharmacologic: Enoxaparin (Lovenox)  Mechanical VTE Prophylaxis in Place: Yes    Patient Centered Rounds: I have performed bedside rounds with nursing staff today  Discussions with Specialists or Other Care Team Provider:  Neurology    Time Spent for Care: 30 minutes  More than 50% of total time spent on counseling and coordination of care as described above  Current Length of Stay: 1 day(s)    Current Patient Status: Inpatient   Certification Statement: The patient will continue to require additional inpatient hospital stay due to Awaiting inpatient procedure    Discharge Plan:  ANA on Monday    Code Status: Level 1 - Full Code      Subjective:   Patient seen examined at bedside  He still feels confused and has difficulty with the alphabet  Denies chest pain shortness of breath  Objective:     Vitals:   Temp (24hrs), Av 9 °F (36 6 °C), Min:97 7 °F (36 5 °C), Max:98 °F (36 7 °C)    Temp:  [97 7 °F (36 5 °C)-98 °F (36 7 °C)] 98 °F (36 7 °C)  HR:  [60-79] 71  Resp:  [18] 18  BP: (144-178)/() 162/86  SpO2:  [95 %-98 %] 95 %  Body mass index is 48 18 kg/m²  Input and Output Summary (last 24 hours): Intake/Output Summary (Last 24 hours) at 2021 1240  Last data filed at 2021 0501  Gross per 24 hour   Intake 1600 ml   Output 1600 ml   Net 0 ml       Physical Exam:     Physical Exam  Constitutional:       General: He is not in acute distress  Appearance: He is well-developed  He is not diaphoretic  HENT:      Head: Normocephalic and atraumatic  Eyes:      General: No scleral icterus  Conjunctiva/sclera: Conjunctivae normal       Pupils: Pupils are equal, round, and reactive to light  Cardiovascular:      Rate and Rhythm: Normal rate and regular rhythm  Heart sounds: Normal heart sounds  No murmur     Pulmonary:      Effort: Pulmonary effort is normal  No respiratory distress  Breath sounds: Normal breath sounds  No wheezing or rales  Abdominal:      General: Bowel sounds are normal  There is no distension  Palpations: Abdomen is soft  Tenderness: There is no abdominal tenderness  There is no guarding  Musculoskeletal:         General: No tenderness or deformity  Skin:     General: Skin is warm and dry  Capillary Refill: Capillary refill takes less than 2 seconds  Neurological:      Mental Status: He is alert and oriented to person, place, and time  Comments: Word-finding difficulty, some receptive aphasia, moves all 4 extremities equally, 4-5 right upper/right lower extremity   Psychiatric:         Mood and Affect: Mood normal          Behavior: Behavior normal          Thought Content: Thought content normal          Judgment: Judgment normal          Additional Data:     Labs:  I have reviewed pertinent results     Results from last 7 days   Lab Units 01/21/21  0618   WBC Thousand/uL 9 77   HEMOGLOBIN g/dL 9 0*   HEMATOCRIT % 32 3*   PLATELETS Thousands/uL 251   NEUTROS PCT % 63   LYMPHS PCT % 23   MONOS PCT % 10   EOS PCT % 2     Results from last 7 days   Lab Units 01/21/21  0618   SODIUM mmol/L 142   POTASSIUM mmol/L 4 0   CHLORIDE mmol/L 105   CO2 mmol/L 30   BUN mg/dL 17   CREATININE mg/dL 1 19   ANION GAP mmol/L 7   CALCIUM mg/dL 8 0*   ALBUMIN g/dL 2 9*   TOTAL BILIRUBIN mg/dL 0 94   ALK PHOS U/L 59   ALT U/L 2,552*   AST U/L 743*   GLUCOSE RANDOM mg/dL 83     Results from last 7 days   Lab Units 01/21/21  0104   INR  1 36*         Results from last 7 days   Lab Units 01/21/21  0618   HEMOGLOBIN A1C % 5 8*             Imaging: I have reviewed pertinent imaging       Recent Cultures (last 7 days):           Last 24 Hours Medication List:   Current Facility-Administered Medications   Medication Dose Route Frequency Provider Last Rate    acetaminophen  650 mg Oral Q4H PRN CHELSI Sheriff      aluminum-magnesium hydroxide-simethicone  30 mL Oral Q6H PRN Azalee Cloud, CRNP      amLODIPine  5 mg Oral Daily Ami Corolla, DO      aspirin  81 mg Oral Daily Azalee [place], [address]      atorvastatin  40 mg Oral QPM Azalee Cloud, CRNP      clopidogrel  75 mg Oral Daily Azalee [place], [address]      folic acid  1 mg Oral Daily Azalee Cloud, CRNP      hydrOXYzine HCL  50 mg Oral TID PRN Azalee Cloud, CRNP      iron sucrose  200 mg Intravenous Daily Ami Corolla, DO      Labetalol HCl  10 mg Intravenous Q6H PRN Ami Corolla, DO      metoclopramide  10 mg Intravenous Q6H PRN Azalee Cloud, CRNP      mirtazapine  30 mg Oral HS Azalee Cloud, CRNP      multivitamin-minerals  1 tablet Oral Daily Azalee [place], [address]      nitroglycerin  0 4 mg Sublingual Q5 Min PRN Azalee Cloud, CRNP      oxyCODONE  5 mg Oral Q6H PRN Azalee Cloud, CRNP      senna  2 tablet Oral Daily PRN Azalee [place], CRNP      sodium chloride  250 mL/hr Intravenous Continuous Azalee Cloud, CRNP 250 mL/hr (01/22/21 0345)    thiamine  100 mg Oral Daily Azalee Cloud, CRNP          Today, Patient Was Seen By: Argelia Alexander DO    ** Please Note: Dictation voice to text software may have been used in the creation of this document   **

## 2021-01-22 NOTE — PLAN OF CARE
Problem: Neurological Deficit  Goal: Neurological status is stable or improving  Description: Interventions:  - Monitor and assess patient's level of consciousness, motor function, sensory function, and level of assistance needed for ADLs  - Monitor and report changes from baseline  Collaborate with interdisciplinary team to initiate plan and implement interventions as ordered  - Provide and maintain a safe environment  - Consider seizure precautions  - Consider fall precautions  - Consider aspiration precautions  - Consider bleeding precautions  Outcome: Progressing     Problem: Activity Intolerance/Impaired Mobility  Goal: Mobility/activity is maintained at optimum level for patient  Description: Interventions:  - Assess and monitor patient  barriers to mobility and need for assistive/adaptive devices  - Assess patient's emotional response to limitations  - Collaborate with interdisciplinary team and initiate plans and interventions as ordered  - Encourage independent activity per ability   - Maintain proper body alignment  - Perform active/passive rom as tolerated/ordered  - Plan activities to conserve energy   - Turn patient as appropriate  Outcome: Progressing     Problem: Communication Impairment  Goal: Ability to express needs and understand communication  Description: Assess patient's communication skills and ability to understand information  Patient will demonstrate use of effective communication techniques, alternative methods of communication and understanding even if not able to speak  - Encourage communication and provide alternate methods of communication as needed  - Collaborate with case management/ for discharge needs  - Include patient/family/caregiver in decisions related to communication    Outcome: Progressing     Problem: Potential for Aspiration  Goal: Non-ventilated patient's risk of aspiration is minimized  Description: Assess and monitor vital signs, respiratory status, and labs (WBC)  Monitor for signs of aspiration (tachypnea, cough, rales, wheezing, cyanosis, fever)  - Assess and monitor patient's ability to swallow  - Place patient up in chair to eat if possible  - HOB up at 90 degrees to eat if unable to get patient up into chair   - Supervise patient during oral intake  - Instruct patient/ family to take small bites  - Instruct patient/ family to take small single sips when taking liquids  - Follow patient-specific strategies generated by speech pathologist   Outcome: Progressing  Goal: Ventilated patient's risk of aspiration is minimized  Description: Assess and monitor vital signs, respiratory status, airway cuff pressure, and labs (WBC)  Monitor for signs of aspiration (tachypnea, cough, rales, wheezing, cyanosis, fever)  - Elevate head of bed 30 degrees if patient has tube feeding   - Monitor tube feeding  Outcome: Progressing     Problem: Nutrition  Goal: Nutrition/Hydration status is improving  Description: Monitor and assess patient's nutrition/hydration status for malnutrition (ex- brittle hair, bruises, dry skin, pale skin and conjunctiva, muscle wasting, smooth red tongue, and disorientation)  Collaborate with interdisciplinary team and initiate plan and interventions as ordered  Monitor patient's weight and dietary intake as ordered or per policy  Utilize nutrition screening tool and intervene per policy  Determine patient's food preferences and provide high-protein, high-caloric foods as appropriate  - Assist patient with eating   - Allow adequate time for meals   - Encourage patient to take dietary supplement as ordered  - Collaborate with clinical nutritionist   - Include patient/family/caregiver in decisions related to nutrition    Outcome: Progressing     Problem: PAIN - ADULT  Goal: Verbalizes/displays adequate comfort level or baseline comfort level  Description: Interventions:  - Encourage patient to monitor pain and request assistance  - Assess pain using appropriate pain scale  - Administer analgesics based on type and severity of pain and evaluate response  - Implement non-pharmacological measures as appropriate and evaluate response  - Consider cultural and social influences on pain and pain management  - Notify physician/advanced practitioner if interventions unsuccessful or patient reports new pain  Outcome: Progressing     Problem: INFECTION - ADULT  Goal: Absence or prevention of progression during hospitalization  Description: INTERVENTIONS:  - Assess and monitor for signs and symptoms of infection  - Monitor lab/diagnostic results  - Monitor all insertion sites, i e  indwelling lines, tubes, and drains  - Monitor endotracheal if appropriate and nasal secretions for changes in amount and color  - [place] appropriate cooling/warming therapies per order  - Administer medications as ordered  - Instruct and encourage patient and family to use good hand hygiene technique  - Identify and instruct in appropriate isolation precautions for identified infection/condition  Outcome: Progressing  Goal: Absence of fever/infection during neutropenic period  Description: INTERVENTIONS:  - Monitor WBC    Outcome: Progressing     Problem: SAFETY ADULT  Goal: Patient will remain free of falls  Description: INTERVENTIONS:  - Assess patient frequently for physical needs  -  Identify cognitive and physical deficits and behaviors that affect risk of falls    -  [place] fall precautions as indicated by assessment   - Educate patient/family on patient safety including physical limitations  - Instruct patient to call for assistance with activity based on assessment  - Modify environment to reduce risk of injury  - Consider OT/PT consult to assist with strengthening/mobility  Outcome: Progressing  Goal: Maintain or return to baseline ADL function  Description: INTERVENTIONS:  -  Assess patient's ability to carry out ADLs; assess patient's baseline for ADL function and identify physical deficits which impact ability to perform ADLs (bathing, care of mouth/teeth, toileting, grooming, dressing, etc )  - Assess/evaluate cause of self-care deficits   - Assess range of motion  - Assess patient's mobility; develop plan if impaired  - Assess patient's need for assistive devices and provide as appropriate  - Encourage maximum independence but intervene and supervise when necessary  - Involve family in performance of ADLs  - Assess for home care needs following discharge   - Consider OT consult to assist with ADL evaluation and planning for discharge  - Provide patient education as appropriate  Outcome: Progressing  Goal: Maintain or return mobility status to optimal level  Description: INTERVENTIONS:  - Assess patient's baseline mobility status (ambulation, transfers, stairs, etc )    - Identify cognitive and physical deficits and behaviors that affect mobility  - Identify mobility aids required to assist with transfers and/or ambulation (gait belt, sit-to-stand, lift, walker, cane, etc )  - [place] fall precautions as indicated by assessment  - Record patient progress and toleration of activity level on Mobility SBAR; progress patient to next Phase/Stage  - Instruct patient to call for assistance with activity based on assessment  - Consider rehabilitation consult to assist with strengthening/weightbearing, etc   Outcome: Progressing     Problem: DISCHARGE PLANNING  Goal: Discharge to home or other facility with appropriate resources  Description: INTERVENTIONS:  - Identify barriers to discharge w/patient and caregiver  - Arrange for needed discharge resources and transportation as appropriate  - Identify discharge learning needs (meds, wound care, etc )  - Arrange for interpretive services to assist at discharge as needed  - Refer to Case Management Department for coordinating discharge planning if the patient needs post-hospital services based on physician/advanced practitioner order or complex needs related to functional status, cognitive ability, or social support system  Outcome: Progressing     Problem: Knowledge Deficit  Goal: Patient/family/caregiver demonstrates understanding of disease process, treatment plan, medications, and discharge instructions  Description: Complete learning assessment and assess knowledge base    Interventions:  - Provide teaching at level of understanding  - Provide teaching via preferred learning methods  Outcome: Progressing

## 2021-01-22 NOTE — PHYSICAL THERAPY NOTE
Physical Therapy Progress Note     01/22/21 1517   Note Type   Note Type Treatment   Pain Assessment   Pain Assessment Tool Pain Assessment not indicated - pt denies pain   Pain Score No Pain   Restrictions/Precautions   Weight Bearing Precautions Per Order No   Other Precautions Cognitive; Chair Alarm; Bed Alarm; Fall Risk;Multiple lines; Impulsive   General   Chart Reviewed Yes   Response to Previous Treatment Patient reporting fatigue but able to participate  Family/Caregiver Present No   Subjective   Subjective "I'm in pretty rough shape "   Bed Mobility   Supine to Sit 5  Supervision   Additional items Assist x 1;HOB elevated; Bedrails;Leg ; Increased time required;Verbal cues;LE management   Sit to Supine 5  Supervision   Additional items Assist x 1;Bedrails;Leg ; Increased time required;Verbal cues;LE management   Transfers   Sit to Stand 5  Supervision   Additional items Assist x 1;Bedrails; Increased time required;Verbal cues   Stand to Sit 5  Supervision   Additional items Assist x 1;Bedrails; Increased time required;Verbal cues   Ambulation/Elevation   Gait pattern Decreased foot clearance; Forward Flexion; Short stride; Excessively slow; Inconsistent sg; Shuffling   Gait Assistance 5  Supervision   Additional items Assist x 1;Verbal cues; Tactile cues   Assistive Device None   Distance 10'   Balance   Static Sitting Good   Dynamic Sitting Fair +   Static Standing Fair   Dynamic Standing Fair -   Ambulatory Fair -   Endurance Deficit   Endurance Deficit No   Activity Tolerance   Activity Tolerance Patient tolerated treatment well   Medical Staff Made Dru López DPT   Nurse Made Aware Yes   Exercises   THR Supine;Sitting;10 reps;AAROM; Bilateral   Assessment   Prognosis Good   Problem List Decreased range of motion; Impaired balance;Decreased mobility; Decreased coordination;Decreased cognition; Impaired judgement;Decreased safety awareness; Obesity; Impaired sensation   Assessment Pt  supine in bed upon my arrival  Pt  reporting increased fatigue, with no pain however reports static feeling in RUE maybe some "numbness"  Performance of HEP with cues provided for proper completion  Progressed with transfers being able to complete practicing proper technique with no noted LOB  Pt  progressed with an amb  trial with no AD and standbyA of therapist with cues provided for LE sequencing  Pt  remains impulsive with movements, requiring cueing for pacing and increased safety awareness  Returned to supine in bed at end of treatment session  Due to social and environmental barriers PT would recommend d/c to inpatient pysch vs  rehab when medically stable for continued improvement of noted impairments above  Barriers to Discharge Inaccessible home environment;Decreased caregiver support   Barriers to Discharge Comments NORMA, lives alone  Goals   Patient Goals To figure out what is going on  STG Expiration Date 02/04/21   PT Treatment Day 1   Plan   Treatment/Interventions Functional transfer training;LE strengthening/ROM; Therapeutic exercise; Endurance training;Bed mobility;Gait training;Spoke to nursing;Spoke to case management   Progress Progressing toward goals   PT Frequency Other (Comment)  (3-5x/wk)   Recommendation   PT Discharge Recommendation Other (Comment)  (inpatient psych vs  pt  prefers rehab )   Equipment Recommended Other (Comment)  (continue to assess)   PT - OK to Discharge Yes  (if d/c when medically stable )       Marcia Zavala, PTA

## 2021-01-22 NOTE — ASSESSMENT & PLAN NOTE
Creatinine 1 3  Possibly due to increased muscle mass in setting of steroid use  Continue IV fluids continue to monitor

## 2021-01-22 NOTE — ASSESSMENT & PLAN NOTE
Likely related to ischemic hepatitis  Ultrasound with hepatic steatosis  LFTs trending down  Continue to monitor  Appreciate GI recommendations

## 2021-01-22 NOTE — PLAN OF CARE
Problem: Neurological Deficit  Goal: Neurological status is stable or improving  Description: Interventions:  - Monitor and assess patient's level of consciousness, motor function, sensory function, and level of assistance needed for ADLs  - Monitor and report changes from baseline  Collaborate with interdisciplinary team to initiate plan and implement interventions as ordered  - Provide and maintain a safe environment  - Consider seizure precautions  - Consider fall precautions  - Consider aspiration precautions  - Consider bleeding precautions  Outcome: Progressing     Problem: Activity Intolerance/Impaired Mobility  Goal: Mobility/activity is maintained at optimum level for patient  Description: Interventions:  - Assess and monitor patient  barriers to mobility and need for assistive/adaptive devices  - Assess patient's emotional response to limitations  - Collaborate with interdisciplinary team and initiate plans and interventions as ordered  - Encourage independent activity per ability   - Maintain proper body alignment  - Perform active/passive rom as tolerated/ordered  - Plan activities to conserve energy   - Turn patient as appropriate  Outcome: Progressing     Problem: Communication Impairment  Goal: Ability to express needs and understand communication  Description: Assess patient's communication skills and ability to understand information  Patient will demonstrate use of effective communication techniques, alternative methods of communication and understanding even if not able to speak  - Encourage communication and provide alternate methods of communication as needed  - Collaborate with case management/ for discharge needs  - Include patient/family/caregiver in decisions related to communication    Outcome: Progressing     Problem: Potential for Aspiration  Goal: Non-ventilated patient's risk of aspiration is minimized  Description: Assess and monitor vital signs, respiratory status, and labs (WBC)  Monitor for signs of aspiration (tachypnea, cough, rales, wheezing, cyanosis, fever)  - Assess and monitor patient's ability to swallow  - Place patient up in chair to eat if possible  - HOB up at 90 degrees to eat if unable to get patient up into chair   - Supervise patient during oral intake  - Instruct patient/ family to take small bites  - Instruct patient/ family to take small single sips when taking liquids  - Follow patient-specific strategies generated by speech pathologist   Outcome: Progressing  Goal: Ventilated patient's risk of aspiration is minimized  Description: Assess and monitor vital signs, respiratory status, airway cuff pressure, and labs (WBC)  Monitor for signs of aspiration (tachypnea, cough, rales, wheezing, cyanosis, fever)  - Elevate head of bed 30 degrees if patient has tube feeding   - Monitor tube feeding  Outcome: Progressing     Problem: Nutrition  Goal: Nutrition/Hydration status is improving  Description: Monitor and assess patient's nutrition/hydration status for malnutrition (ex- brittle hair, bruises, dry skin, pale skin and conjunctiva, muscle wasting, smooth red tongue, and disorientation)  Collaborate with interdisciplinary team and initiate plan and interventions as ordered  Monitor patient's weight and dietary intake as ordered or per policy  Utilize nutrition screening tool and intervene per policy  Determine patient's food preferences and provide high-protein, high-caloric foods as appropriate  - Assist patient with eating   - Allow adequate time for meals   - Encourage patient to take dietary supplement as ordered  - Collaborate with clinical nutritionist   - Include patient/family/caregiver in decisions related to nutrition    Outcome: Progressing     Problem: PAIN - ADULT  Goal: Verbalizes/displays adequate comfort level or baseline comfort level  Description: Interventions:  - Encourage patient to monitor pain and request assistance  - Assess pain using appropriate pain scale  - Administer analgesics based on type and severity of pain and evaluate response  - Implement non-pharmacological measures as appropriate and evaluate response  - Consider cultural and social influences on pain and pain management  - Notify physician/advanced practitioner if interventions unsuccessful or patient reports new pain  Outcome: Progressing     Problem: INFECTION - ADULT  Goal: Absence or prevention of progression during hospitalization  Description: INTERVENTIONS:  - Assess and monitor for signs and symptoms of infection  - Monitor lab/diagnostic results  - Monitor all insertion sites, i e  indwelling lines, tubes, and drains  - Monitor endotracheal if appropriate and nasal secretions for changes in amount and color  - Cooter appropriate cooling/warming therapies per order  - Administer medications as ordered  - Instruct and encourage patient and family to use good hand hygiene technique  - Identify and instruct in appropriate isolation precautions for identified infection/condition  Outcome: Progressing  Goal: Absence of fever/infection during neutropenic period  Description: INTERVENTIONS:  - Monitor WBC    Outcome: Progressing     Problem: SAFETY ADULT  Goal: Patient will remain free of falls  Description: INTERVENTIONS:  - Assess patient frequently for physical needs  -  Identify cognitive and physical deficits and behaviors that affect risk of falls    -  Cooter fall precautions as indicated by assessment   - Educate patient/family on patient safety including physical limitations  - Instruct patient to call for assistance with activity based on assessment  - Modify environment to reduce risk of injury  - Consider OT/PT consult to assist with strengthening/mobility  Outcome: Progressing  Goal: Maintain or return to baseline ADL function  Description: INTERVENTIONS:  -  Assess patient's ability to carry out ADLs; assess patient's baseline for ADL function and identify physical deficits which impact ability to perform ADLs (bathing, care of mouth/teeth, toileting, grooming, dressing, etc )  - Assess/evaluate cause of self-care deficits   - Assess range of motion  - Assess patient's mobility; develop plan if impaired  - Assess patient's need for assistive devices and provide as appropriate  - Encourage maximum independence but intervene and supervise when necessary  - Involve family in performance of ADLs  - Assess for home care needs following discharge   - Consider OT consult to assist with ADL evaluation and planning for discharge  - Provide patient education as appropriate  Outcome: Progressing  Goal: Maintain or return mobility status to optimal level  Description: INTERVENTIONS:  - Assess patient's baseline mobility status (ambulation, transfers, stairs, etc )    - Identify cognitive and physical deficits and behaviors that affect mobility  - Identify mobility aids required to assist with transfers and/or ambulation (gait belt, sit-to-stand, lift, walker, cane, etc )  - Larose fall precautions as indicated by assessment  - Record patient progress and toleration of activity level on Mobility SBAR; progress patient to next Phase/Stage  - Instruct patient to call for assistance with activity based on assessment  - Consider rehabilitation consult to assist with strengthening/weightbearing, etc   Outcome: Progressing     Problem: DISCHARGE PLANNING  Goal: Discharge to home or other facility with appropriate resources  Description: INTERVENTIONS:  - Identify barriers to discharge w/patient and caregiver  - Arrange for needed discharge resources and transportation as appropriate  - Identify discharge learning needs (meds, wound care, etc )  - Arrange for interpretive services to assist at discharge as needed  - Refer to Case Management Department for coordinating discharge planning if the patient needs post-hospital services based on physician/advanced practitioner order or complex needs related to functional status, cognitive ability, or social support system  Outcome: Progressing     Problem: Knowledge Deficit  Goal: Patient/family/caregiver demonstrates understanding of disease process, treatment plan, medications, and discharge instructions  Description: Complete learning assessment and assess knowledge base    Interventions:  - Provide teaching at level of understanding  - Provide teaching via preferred learning methods  Outcome: Progressing

## 2021-01-22 NOTE — ASSESSMENT & PLAN NOTE
Microcytic anemia with iron deficiency anemia  Likely secondary to poor nutrition in setting of alcoholism  Will provide IV iron

## 2021-01-22 NOTE — CONSULTS
PHYSICAL MEDICINE AND REHABILITATION CONSULT NOTE  Petros Chavez 41 y o  male MRN: [id number]  Unit/Bed#: E4 -01 Encounter: [id number]    Requested by (Physician/Service): Arminda Garsia DO  Reason for Consultation:  Assessment of rehabilitation needs    Assessment:  Rehabilitation Diagnosis:    Left MCA Stroke   Impaired mobility and self care   Impaired cognition     Recommendations:  Rehabilitation Plan:   Continue PT/OT (SLP) while on acute care   His home situation is unclear at this time, but it sounds like he does not have a clear disposition  He is currently at a [address] level with therapy, I anticipate him doing well long term  He would benefit from short term rehabilition to get to an independent/modified independent level prior to discharge  In the meantime, I would have therapy work with him regularly and have SLP evaluate him as well  There is a possibility he improved to this level by the time his medical work up is completed   We will follow up with him next week to see how he is improving   Covid-19 Testing: [facility] rehabilitation units require testing within 48 hours of potential admission for all general admissions  Please re-test if needed  *Re-testing is NOT required for patients recovering from COVID-19 infection if isolation has been discontinued per CDC criteria  Medical Co-morbidities Plan:  Left MCA Stroke  Obesity BMI 48 18  Rhabdomyolysis  Hypertension with hypertensive urgency  REANNA  Microcytic Anemia  Alcohol use  Opioid abuse  Anabolic steroid use- recreational  NSTEMI  Major depressive disorder  Anemia  Bowel plan: No Bowel meds at this time  Last BM 1/21  Recommend adding colace, senna, PRN Miralax if no BM in 2 days  Bladder plan: continent  DVT ppx: lovenox      Thank you for this consultation  Do not hesitate to contact service with further questions  Reena Kohler DO  PM&R    History of Present Illness:  Petros Chavez is a 41 y o  male with PMH HTN, anxiety, depression, alcohol and drug abuse who presents with c/o left hand and left leg pain, numbness, tingling and weakness  Reports associated confusion and word-finding difficulty  States 3 days ago he drank alcohol, took 2 or 3 tablets of Percocet and anabolic steroids, fell asleep and woke up the next day on the floor  States he had a difficult time getting into bed  Eventually he was able to go back into bed and has been resting in bed for 3 days, has not eaten or drank any fluids  Confusion and weakness persisted, he had difficulty ambulating  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  Today he states he is weak, with numbness and tingling in the right hand  He feels like his memory is foggy and has difficulty with expressing words  He denies fever, chills, nausea, emesis, diarrhea constipation, urinary frequency, headache, vision changes, orshortness of breath  Review of Systems: 10 point ROS negative except for what is noted in HPI    Function:  Prior level of function and living situation:  Independent previously, lives alone with dog, however story about a friend/drug user that is watching his apartment  Also that he will be evicted and that he cant access his $150,000 savings at this time  Attempted to reach sister due to odd storyline and history, but not available  He has a full flight of stairs to his 2nd floor apartment  He is asking how he can officially become disabled      Current level of function:  Physical Therapy: bed mobility supervision, transfer supervision, 10' ambulation supervision  Occupational Therapy: eating, grooming supervision, UB ADLs supervision, LB ADLs Min A    Physical Exam:  BP (!) 172/99 (BP Location: Right arm)   Pulse 65   Temp 97 5 °F (36 4 °C) (Temporal)   Resp 18   Ht 5' 9" (1 753 m)   Wt (!) 148 kg (326 lb 4 5 oz)   SpO2 99%   BMI 48 18 kg/m²        Intake/Output Summary (Last 24 hours) at 1/22/2021 1555  Last data filed at 1/22/2021 0501  Gross per 24 hour   Intake 600 ml   Output 1600 ml   Net -1000 ml       Body mass index is 48 18 kg/m²  Gen: No acute distress, Well-nourished, well-appearing  HEENT:  Normocephalic/Atraumatic, EOMI, Moist mucus membranes  Cardiovascular: Regular rate, rhythm  Extremities: No edema  Pulmonary: Non-labored breathing  Lungs CTAB  GI: Soft, non-tender, non-distended  BS+  MSK: ROM is WFL in all extremities, No appreciated spasticity/tone  Integumentary: Skin is warm, dry  No rashes or ulcers  Neuro: AAOx3, Speech is fluent  Patient is following commands  Sensation to light touch is preserved except diminished int he distal RUE and RLE  Motor:   RUE: 5/5  LUE: 5/5  RLE: 5/5  LLE:  5/5  Psych: Normal mood and affect         Social History:    Social History     Socioeconomic History    Marital status: Single     Spouse name: None    Number of children: None    Years of education: None    Highest education level: None   Occupational History    None   Social Needs    Financial resource strain: None    Food insecurity     Worry: None     Inability: None    Transportation needs     Medical: None     Non-medical: None   Tobacco Use    Smoking status: Unknown If Ever Smoked    Smokeless tobacco: Current User     Types: Chew   Substance and Sexual Activity    Alcohol use: Yes     Comment: states "Im trying to quit"     Drug use: Yes     Comment: percocets     Sexual activity: None   Lifestyle    Physical activity     Days per week: None     Minutes per session: None    Stress: None   Relationships    Social connections     Talks on phone: None     Gets together: None     Attends [employer] service: None     Active member of club or organization: None     Attends meetings of clubs or organizations: None     Relationship status: None    Intimate partner violence     Fear of current or ex partner: None     Emotionally abused: None     Physically abused: None     Forced sexual activity: None   Other Topics Concern    None   Social History Narrative    None        Family History:    History reviewed  No pertinent family history        Medications:     Current Facility-Administered Medications:     acetaminophen (TYLENOL) tablet 650 mg, 650 mg, Oral, Q4H PRN, Tre Montana, CRNP    aluminum-magnesium hydroxide-simethicone (MYLANTA) oral suspension 30 mL, 30 mL, Oral, Q6H PRN, Ernbrentene Rubén, CRNP    amLODIPine (NORVASC) tablet 5 mg, 5 mg, Oral, Daily, Lovina Pouch, DO, 5 mg at 01/22/21 1339    aspirin (ECOTRIN LOW STRENGTH) EC tablet 81 mg, 81 mg, Oral, Daily, Tre Montana, CRNP, 81 mg at 01/22/21 0825    atorvastatin (LIPITOR) tablet 40 mg, 40 mg, Oral, QPM, Ernestene Rubén, CRNP, 40 mg at 01/21/21 1759    [COMPLETED] clopidogrel (PLAVIX) tablet 600 mg, 600 mg, Oral, Once, 600 mg at 01/21/21 0315 **AND** clopidogrel (PLAVIX) tablet 75 mg, 75 mg, Oral, Daily, Ernestene Rubén, CRNP, 75 mg at 01/22/21 0825    enoxaparin (LOVENOX) subcutaneous injection 30 mg, 30 mg, Subcutaneous, Q12H [address], Lovina Pouch, DO, 30 mg at 14/20/93 6827    folic acid (FOLVITE) tablet 1 mg, 1 mg, Oral, Daily, Ernbrentene Rubén, CRNP, 1 mg at 01/22/21 0481    hydrOXYzine HCL (ATARAX) tablet 50 mg, 50 mg, Oral, TID PRN, Ernestene Rubén, CRNP, 50 mg at 01/21/21 2499    iron sucrose (VENOFER) 200 mg in sodium chloride 0 9 % 100 mL IVPB, 200 mg, Intravenous, Daily, Lovina Pouch, DO, Last Rate: 100 mL/hr at 01/22/21 1338, 200 mg at 01/22/21 1338    Labetalol HCl (NORMODYNE) injection 10 mg, 10 mg, Intravenous, Q6H PRN, Lovina Pouch, DO    metoclopramide (REGLAN) injection 10 mg, 10 mg, Intravenous, Q6H PRN, CHELSI Preciado    mirtazapine (REMERON) tablet 30 mg, 30 mg, Oral, HS, CHELSI Preciado, 30 mg at 01/21/21 6500    multivitamin-minerals (CENTRUM) tablet 1 tablet, 1 tablet, Oral, Daily, Killdeer Salo, CRNP, 1 tablet at 01/22/21 0825    nitroglycerin (NITROSTAT) SL tablet 0 4 mg, 0 4 mg, Sublingual, Q5 Min PRN, Killdeer Salo, CRNP    oxyCODONE (ROXICODONE) IR tablet 5 mg, 5 mg, Oral, Q6H PRN, Killdeer Salo, CRNP    senna (SENOKOT) tablet 17 2 mg, 2 tablet, Oral, Daily PRN, Miguel Salo, CRNP    sodium chloride 0 9 % infusion, 150 mL/hr, Intravenous, Continuous, Chas Pea, DO, Last Rate: 150 mL/hr at 01/22/21 1337, 150 mL/hr at 01/22/21 1337    thiamine (VITAMIN B1) tablet 100 mg, 100 mg, Oral, Daily, Miguel Salo, CRNP, 100 mg at 01/22/21 9625    Past Medical History:     Past Medical History:   Diagnosis Date    High blood pressure         Past Surgical History:     Past Surgical History:   Procedure Laterality Date    HERNIA REPAIR           Allergies:     No Known Allergies        LABORATORY RESULTS:      Lab Results   Component Value Date    HGB 9 0 (L) 01/21/2021    HCT 32 3 (L) 01/21/2021    WBC 9 77 01/21/2021     Lab Results   Component Value Date    BUN 12 01/22/2021    K 3 8 01/22/2021     01/22/2021    CREATININE 1 01 01/22/2021     Lab Results   Component Value Date    PROTIME 15 5 (H) 01/22/2021    INR 1 25 (H) 01/22/2021        DIAGNOSTIC STUDIES: Reviewed  Cta Head And Neck W Wo Contrast    Result Date: 1/21/2021  Impression: Stable subacute ischemia left MCA distribution, embolic in nature based on discontiguous foci of involvement  No nancy hemorrhage, no change in minimal mass effect  No large vessel flow restrictive disease within the head or neck  Slightly diminished distal left MCA branches  Workstation performed: [id number]     Ct Head Without Contrast    Result Date: 1/21/2021  Impression: Subacute left MCA territory infarction, as described above  No evidence of hemorrhagic conversion  Neurology consultation and follow-up is recommended    I personally discussed this study with DR Tracy Gallo on 1/21/2021 at 12:59 AM  Workstation performed: [id number]     Ct Spine Cervical Without Contrast    Result Date: 1/21/2021  Impression: No cervical spine fracture or traumatic malalignment  There is mild gentle reversal of cervical lordosis  Mild degenerative changes are present  Workstation performed: [id number]     Ct Chest Abdomen Pelvis W Contrast    Result Date: 1/21/2021  Impression: No evidence of acute intrathoracic, intra-abdominal or intrapelvic parenchymal organ injury  No pneumothorax  There is a moderate to large hiatal hernia  Bilateral gynecomastia  I personally discussed this study with DR Tracy Gallo on 1/21/2021 at 12:59 AM  Workstation performed: [id number]     Us Right Upper Quadrant    Result Date: 1/22/2021  Impression: Evidence of hepatic steatosis with sparing adjacent to the portal vein  No gallstones  Normal ducts   Workstation performed: [id number]       Cassidy Alicea,   Physical Medicine and [place] [id number]

## 2021-01-22 NOTE — PROGRESS NOTES
Progress Note - Neurology   Megan Held 41 y o  male MRN: [id number]  Unit/Bed#: E4 -01 Encounter: [id number]      Assessment/Plan     * Acute CVA (cerebrovascular accident) [facility])  Assessment & Plan  41 y o  male with HTN, chronic daily alcohol use, drug use including Percocet, benzos and anabolic steroids, depression and anxiety who presented to the ED with confusion, word-finding difficulties, visual disturbance and mild R sided weakness and numbness after waking up on his floor, possibly lying there for 3 days  CTH on presentation confirmed L MCA infarct  Patient initially started on heparin drip due to concern for NSTEMI  Heparin has since been discontinued as per Cardiology not felt to have acute coronary syndrome  Continues on DAPT  Stroke workup underway  Suspect vasculopathy in the setting of performance enhancing drugs, but given bilaterally dilated atria, cannot exclude cardioembolic source referable to paroxysmal AFib  Need also consider hypercoagulable state given his young age  Patient complains of right upper extremity greater than right lower extremity weakness and numbness, subjective word-finding difficulties, and memory difficulties  Exam significant for mild right upper extremity weakness and decreased sensation right upper and right lower extremities  Speech appears fluent  Some difficulty counting and spelling backwards  Plan:  - MRI brain unable to be performed due to patient's body habitus  CT was done about 3 days post symptoms onset - do not feel it necessary to order followup CT at this time    - ANA pending for 1/25/20 as Cardiology had emergent case today  - PM&R consult ordered  - Continue aspirin 81mg and Plavix 75mg daily  - Atorvastatin 40mg  - thrombosis panel pending    - goal normotension  - Telemetry  - Secondary risk factor modification   - PT/OT/ST    Results:  - CTH demonstrated subacute left MCA territory infarction involving the left posterior frontal parietal region and left frontal lobe  No evidence of hemorrhagic conversion     - CTA head/neck demonstrated subtle under filling of the distal left MCA branches with no proximal obstructive disease or LVO  Otherwise unremarkable  - 2D echo with bilaterally mildly dilated atria, EF 60%, no MAC   - AST/ALT 1099/3144  - Total CK 72374 on presentation  - INR 1 36  - LDL 46  - A1c 5 8  - COVID/influenza/RSV negative  - UDS positive for Benzos    Rhabdomyolysis  Assessment & Plan  - Reports of collapsing to the ground and lying on the floor for 3 days  - total CK 32420 on presentation  - IVF  - Management as per primary team    Acute liver failure without hepatic coma  Assessment & Plan  - Chronic daily alcohol use  - AST/ALT 1099/3144, Tbili 1 01  - Management as per GI/Primary team    Alcohol abuse  Assessment & Plan  - Daily alcohol use  - "At least 40 oz of beer or a 12 pack of beer plus liquor"  - States he relpased a few months ago with longest duration of sobriety being 5 months  - Labs consistent with liver failure, likely in the setting of chronic alcohol use  - [facility] protocol  - Thiamine, Folate        Eliane Maicol will need follow up in in 4 weeks with neurovascular attending or advance practitioner  He will not require outpatient neurological testing  Subjective:   Patient states his memory is foggy and he is not able to recall his computer passwords and banking information, which is not normal for him  Continues to have right upper extremity greater than right lower extremity weakness and subjective word-finding difficulty  States he is anxious and depressed and should have just not woken up, but denies active thoughts of suicidal ideation or plan  ROS: 12 point review of systems performed and negative except as indicated above  Vitals: Blood pressure 162/86, pulse 71, temperature 98 °F (36 7 °C), temperature source Temporal, resp   rate 18, height 5' 9" (1 753 m), weight (!) 148 kg (326 lb 4 5 oz), SpO2 95 %  ,Body mass index is 48 18 kg/m²  Physical Exam:   General:  Patient is well-developed, morbidly obese BMI 48 18, and in no acute distress  HEENT:  Head normocephalic  Eyes anicteric  Cardiovascular:  With regular rhythm  Lungs:  Normal effort  Nonlabored breathing  Extremities:  With no significant edema  Skin: No rashes  Mental Status:  Patient is alert  No obvious symbolic language difficulty or dysarthria, and the patient is fully oriented  Correctly identified the current president  Able to name simple objects and repeat sentences without errors  Gait deferred for safety  Cranial Nerves:   II: Visual fields full to confrontation  Pupils equal, round, reactive to light with normal accomodation  Cannot visualize optic fundi  III,IV,VI: extraocular movements intact with no nystagmus  V: Sensation in the V1 through V3 distributions intact to light touch bilaterally  VII: Face is symmetric with no weakness noted  VIII: Audition intact to finger rub bilaterally  IX/X: Uvula midline  Soft palate elevation symmetric  XII: Tongue midline with no atrophy or fasciculations with appropriate movement  Coordination:  Accurate with finger-to-nose maneuvers bilaterally  Motor testing with right upper extremity pronation, right  4+/5, shoulder abd 4/5, otherwise right upper extremity 5/5  Full strength noted left upper extremity and bilateral lower extremities  Sensory testing grossly with diminished light touch appreciation right upper and lower extremities as compared to left  Reflexes 2 bilateral upper extremities, 2 bilateral knees, trace bilateral ankles  Toe responses are mute bilaterally      Lab, Imaging and other studies:   CBC:   Results from last 7 days   Lab Units 01/21/21  0618 01/20/21  2332   WBC Thousand/uL 9 77 9 45   RBC Million/uL 4 62 5 12   HEMOGLOBIN g/dL 9 0* 10 1*   HEMATOCRIT % 32 3* 35 5*   MCV fL 70* 69*   PLATELETS Thousands/uL 251 295   , BMP/CMP:   Results from last 7 days   Lab Units 01/22/21  1206 01/21/21  0618 01/20/21  2332   SODIUM mmol/L 139 142 141   POTASSIUM mmol/L 3 8 4 0 3 8   CHLORIDE mmol/L 106 105 102   CO2 mmol/L 27 30 31   BUN mg/dL 12 17 23   CREATININE mg/dL 1 01 1 19 1 34*   CALCIUM mg/dL 8 6 8 0* 9 0   AST U/L 266* 743* 1,099*   ALT U/L 1,834* 2,552* 3,144*   ALK PHOS U/L 63 59 70   EGFR ml/min/1 73sq m 91 75 65   , HgBA1C:   Results from last 7 days   Lab Units 01/21/21  0618   HEMOGLOBIN A1C % 5 8*   , Lipid Profile:   Results from last 7 days   Lab Units 01/21/21  0618   HDL mg/dL 25*   LDL CALC mg/dL 46   TRIGLYCERIDES mg/dL 80     VTE Prophylaxis: Heparin    Counseling / Coordination of Care  Total time spent today 25 minutes  Greater than 50% of total time was spent with the patient and / or family counseling and / or coordination of care   A description of the counseling / coordination of care: discussed need for rehab and continuation of DAPT for now

## 2021-01-22 NOTE — SPEECH THERAPY NOTE
Speech Language/Pathology    Speech/Language Pathology Progress Note    Patient Name: Leeanna Michelle  Today's Date: 1/22/2021     Problem List  Principal Problem:    Acute CVA (cerebrovascular accident) [facility])  Active Problems:    MDD (major depressive disorder), recurrent episode, moderate ([facility] [address] )    NSTEMI (non-ST elevated myocardial infarction) ([facility] [address] )    Alcohol abuse    Drug abuse ([facility] [address] )    Acute liver failure without hepatic coma    Microcytic anemia    Elevated serum creatinine    Hypertensive urgency    Rhabdomyolysis    Super obese       Past Medical History  Past Medical History:   Diagnosis Date    High blood pressure         Past Surgical History  Past Surgical History:   Procedure Laterality Date    HERNIA REPAIR           Subjective:  Pt awake and upright on edge of bed  "I think I'm disabled  I don't think I can function from day to day"  Objective:  Pt seen for f/u speech and language tx  Pt was able to follow multi step commands in 3/6 opportunities  R/L discrimination 6/6 opportunities  When writing was assessed, pt had difficulty related to stated R UE  Given adaptive equipment (built up handle), pt was able to write name and age  When asked to write his last name, stated he "can't get it down on paper " Pt was able to spell accurately out loud (WNL retrieval)  C/o blurred vision  However, he was able to read the menu to order food but had difficulty reading enlarged alphabet board when asked to spell out first name  Speech formulation was better today  Pt still stated he is having word finding difficulties but this was not evident during our session  Assessment:  Pt's speech/language has improved from yesterday, although he still c/o word finding difficulties and altered mental status  Pt's cognitive/language abilities are inconsistent, and appear atypical  Cannot r/o other factors contributing  Plan/Recommendations:  ST to f/u as appropriate

## 2021-01-23 LAB
ALBUMIN SERPL BCP-MCNC: 3 G/DL (ref 3.5–5)
ALP SERPL-CCNC: 62 U/L (ref 46–116)
ALT SERPL W P-5'-P-CCNC: 1320 U/L (ref 12–78)
ANION GAP SERPL CALCULATED.3IONS-SCNC: 6 MMOL/L (ref 4–13)
AST SERPL W P-5'-P-CCNC: 161 U/L (ref 5–45)
BASOPHILS # BLD AUTO: 0.03 THOUSANDS/ΜL (ref 0–0.1)
BASOPHILS NFR BLD AUTO: 0 % (ref 0–1)
BILIRUB DIRECT SERPL-MCNC: 0.2 MG/DL (ref 0–0.2)
BILIRUB SERPL-MCNC: 0.56 MG/DL (ref 0.2–1)
BUN SERPL-MCNC: 11 MG/DL (ref 5–25)
CALCIUM SERPL-MCNC: 8.5 MG/DL (ref 8.3–10.1)
CARDIOLIPIN IGA SER IA-ACNC: <9 APL U/ML (ref 0–11)
CARDIOLIPIN IGG SER IA-ACNC: <9 GPL U/ML (ref 0–14)
CARDIOLIPIN IGM SER IA-ACNC: <9 MPL U/ML (ref 0–12)
CHLORIDE SERPL-SCNC: 105 MMOL/L (ref 100–108)
CK MB SERPL-MCNC: 1.5 NG/ML (ref 0–5)
CK MB SERPL-MCNC: <1 % (ref 0–2.5)
CK SERPL-CCNC: 1053 U/L (ref 39–308)
CO2 SERPL-SCNC: 28 MMOL/L (ref 21–32)
CREAT SERPL-MCNC: 1.13 MG/DL (ref 0.6–1.3)
DEPRECATED AT III PPP: 65 % OF NORMAL (ref 92–136)
EOSINOPHIL # BLD AUTO: 0.2 THOUSAND/ΜL (ref 0–0.61)
EOSINOPHIL NFR BLD AUTO: 3 % (ref 0–6)
ERYTHROCYTE [DISTWIDTH] IN BLOOD BY AUTOMATED COUNT: 19 % (ref 11.6–15.1)
GFR SERPL CREATININE-BSD FRML MDRD: 80 ML/MIN/1.73SQ M
GLUCOSE SERPL-MCNC: 90 MG/DL (ref 65–140)
HCT VFR BLD AUTO: 37.3 % (ref 36.5–49.3)
HGB BLD-MCNC: 10.3 G/DL (ref 12–17)
IMM GRANULOCYTES # BLD AUTO: 0.05 THOUSAND/UL (ref 0–0.2)
IMM GRANULOCYTES NFR BLD AUTO: 1 % (ref 0–2)
LYMPHOCYTES # BLD AUTO: 1.59 THOUSANDS/ΜL (ref 0.6–4.47)
LYMPHOCYTES NFR BLD AUTO: 23 % (ref 14–44)
MCH RBC QN AUTO: 19.4 PG (ref 26.8–34.3)
MCHC RBC AUTO-ENTMCNC: 27.6 G/DL (ref 31.4–37.4)
MCV RBC AUTO: 70 FL (ref 82–98)
MONOCYTES # BLD AUTO: 0.52 THOUSAND/ΜL (ref 0.17–1.22)
MONOCYTES NFR BLD AUTO: 8 % (ref 4–12)
NEUTROPHILS # BLD AUTO: 4.53 THOUSANDS/ΜL (ref 1.85–7.62)
NEUTS SEG NFR BLD AUTO: 65 % (ref 43–75)
NRBC BLD AUTO-RTO: 0 /100 WBCS
PLATELET # BLD AUTO: 272 THOUSANDS/UL (ref 149–390)
PMV BLD AUTO: 9.4 FL (ref 8.9–12.7)
POTASSIUM SERPL-SCNC: 4.1 MMOL/L (ref 3.5–5.3)
PROT SERPL-MCNC: 7.1 G/DL (ref 6.4–8.2)
RBC # BLD AUTO: 5.31 MILLION/UL (ref 3.88–5.62)
SODIUM SERPL-SCNC: 139 MMOL/L (ref 136–145)
WBC # BLD AUTO: 6.92 THOUSAND/UL (ref 4.31–10.16)

## 2021-01-23 PROCEDURE — 82550 ASSAY OF CK (CPK): CPT | Performed by: INTERNAL MEDICINE

## 2021-01-23 PROCEDURE — 80048 BASIC METABOLIC PNL TOTAL CA: CPT | Performed by: INTERNAL MEDICINE

## 2021-01-23 PROCEDURE — 99232 SBSQ HOSP IP/OBS MODERATE 35: CPT | Performed by: INTERNAL MEDICINE

## 2021-01-23 PROCEDURE — 85025 COMPLETE CBC W/AUTO DIFF WBC: CPT | Performed by: INTERNAL MEDICINE

## 2021-01-23 PROCEDURE — 80076 HEPATIC FUNCTION PANEL: CPT | Performed by: INTERNAL MEDICINE

## 2021-01-23 PROCEDURE — 94762 N-INVAS EAR/PLS OXIMTRY CONT: CPT

## 2021-01-23 PROCEDURE — 82553 CREATINE MB FRACTION: CPT | Performed by: INTERNAL MEDICINE

## 2021-01-23 RX ORDER — LISINOPRIL 20 MG/1
20 TABLET ORAL DAILY
Status: DISCONTINUED | OUTPATIENT
Start: 2021-01-23 | End: 2021-01-29 | Stop reason: HOSPADM

## 2021-01-23 RX ADMIN — Medication 1 TABLET: at 08:28

## 2021-01-23 RX ADMIN — THIAMINE HCL TAB 100 MG 100 MG: 100 TAB at 08:28

## 2021-01-23 RX ADMIN — ENOXAPARIN SODIUM 30 MG: 30 INJECTION SUBCUTANEOUS at 21:34

## 2021-01-23 RX ADMIN — ATORVASTATIN CALCIUM 40 MG: 40 TABLET, FILM COATED ORAL at 19:00

## 2021-01-23 RX ADMIN — MIRTAZAPINE 30 MG: 15 TABLET, FILM COATED ORAL at 21:34

## 2021-01-23 RX ADMIN — ASPIRIN 81 MG: 81 TABLET, COATED ORAL at 08:28

## 2021-01-23 RX ADMIN — SODIUM CHLORIDE 150 ML/HR: 0.9 INJECTION, SOLUTION INTRAVENOUS at 03:13

## 2021-01-23 RX ADMIN — LISINOPRIL 20 MG: 20 TABLET ORAL at 09:16

## 2021-01-23 RX ADMIN — ENOXAPARIN SODIUM 30 MG: 30 INJECTION SUBCUTANEOUS at 08:28

## 2021-01-23 RX ADMIN — IRON SUCROSE 200 MG: 20 INJECTION, SOLUTION INTRAVENOUS at 09:17

## 2021-01-23 RX ADMIN — LABETALOL 20 MG/4 ML (5 MG/ML) INTRAVENOUS SYRINGE 10 MG: at 01:09

## 2021-01-23 RX ADMIN — CLOPIDOGREL BISULFATE 75 MG: 75 TABLET ORAL at 08:28

## 2021-01-23 RX ADMIN — AMLODIPINE BESYLATE 5 MG: 5 TABLET ORAL at 08:28

## 2021-01-23 RX ADMIN — FOLIC ACID 1 MG: 1 TABLET ORAL at 08:28

## 2021-01-23 NOTE — ASSESSMENT & PLAN NOTE
Likely related to ischemic hepatitis, drug-induced  Ultrasound with hepatic steatosis  LFTs trending down  Continue to monitor  Appreciate GI recommendations

## 2021-01-23 NOTE — PLAN OF CARE
Problem: Neurological Deficit  Goal: Neurological status is stable or improving  Description: Interventions:  - Monitor and assess patient's level of consciousness, motor function, sensory function, and level of assistance needed for ADLs  - Monitor and report changes from baseline  Collaborate with interdisciplinary team to initiate plan and implement interventions as ordered  - Provide and maintain a safe environment  - Consider seizure precautions  - Consider fall precautions  - Consider aspiration precautions  - Consider bleeding precautions    Outcome: Progressing

## 2021-01-23 NOTE — PROGRESS NOTES
Progress Note - Iraida Fallon 1980, 41 y o  male MRN: [id number]    Unit/Bed#: E4 -01 Encounter: [id number]    Primary Care Provider: No primary care provider on file     Date and time admitted to hospital: 1/20/2021 10:49 PM        * Acute CVA (cerebrovascular accident) [facility])  Assessment & Plan  CT head: Subacute left MCA territory infarction, no evidence of hemorrhagic conversion  Is on stroke pathway  Echo without regional wall motion abnormalities  Given timing of symptoms, no need for permissive hypertension per Neurology  Unfortunately patient was unable to fit in MRI  Continue aspirin, statin  Appreciate Neurology recommendations  ANA on Monday     Rhabdomyolysis  Assessment & Plan  CK downtrended significantly  Patient is tolerating oral diet  Monitor off IV fluids    Hypertensive urgency  Assessment & Plan  Continue amlodipine  Add lisinopril 20 mg daily  Prn labetalol       Microcytic anemia  Assessment & Plan  Microcytic anemia with iron deficiency anemia  Likely secondary to poor nutrition in setting of alcoholism  Will provide IV iron    Acute liver failure without hepatic coma  Assessment & Plan  Likely related to ischemic hepatitis, drug-induced  Ultrasound with hepatic steatosis  LFTs trending down  Continue to monitor  Appreciate GI recommendations    Drug abuse ([address] )  Assessment & Plan  Reports taking Percocet tablets and anabolic steroids  Host referral    Alcohol abuse  Assessment & Plan  CIWA protocol  Continue thiamine and folate    NSTEMI (non-ST elevated myocardial infarction) (HCC)  Assessment & Plan  Troponin peaked at 10 mg  EKG with nonspecific changes  No anginal symptoms  Likely secondary to rhabdomyolysis  Cardiology evaluated, no further ischemic evaluation    MDD (major depressive disorder), recurrent episode, moderate (HCC)  Assessment & Plan  Continue home Remeron and Atarax      VTE Pharmacologic Prophylaxis:  Lovenox  Pharmacologic: Enoxaparin (Lovenox)  Mechanical VTE Prophylaxis in Place: Yes    Patient Centered Rounds: I have performed bedside rounds with nursing staff today  Time Spent for Care: 30 minutes  More than 50% of total time spent on counseling and coordination of care as described above  Current Length of Stay: 2 day(s)    Current Patient Status: Inpatient   Certification Statement: The patient will continue to require additional inpatient hospital stay due to Need for inpatient procedure    Discharge Plan:  Continue inpatient management, ANA on Monday    Code Status: Level 1 - Full Code      Subjective:   Patient seen and evaluated at bedside  He still feels a little out of it  Is doing better with the alphabet and understanding numbers     Otherwise is doing okay with ambulating  Notes a little bit of right-sided weakness  Objective:     Vitals:   Temp (24hrs), Av 7 °F (36 5 °C), Min:97 3 °F (36 3 °C), Max:98 2 °F (36 8 °C)    Temp:  [97 3 °F (36 3 °C)-98 2 °F (36 8 °C)] 98 2 °F (36 8 °C)  HR:  [64-77] 66  Resp:  [16-18] 16  BP: (163-184)/(86-99) 168/86  SpO2:  [95 %-99 %] 95 %  Body mass index is 48 18 kg/m²  Input and Output Summary (last 24 hours): Intake/Output Summary (Last 24 hours) at 2021 1537  Last data filed at 2021 1205  Gross per 24 hour   Intake 1100 ml   Output 4273 2 ml   Net -3173 2 ml       Physical Exam:     Physical Exam  Constitutional:       General: He is not in acute distress  Appearance: He is well-developed  He is not diaphoretic  HENT:      Head: Normocephalic and atraumatic  Eyes:      General: No scleral icterus  Conjunctiva/sclera: Conjunctivae normal       Pupils: Pupils are equal, round, and reactive to light  Cardiovascular:      Rate and Rhythm: Normal rate and regular rhythm  Heart sounds: Normal heart sounds  No murmur  Pulmonary:      Effort: Pulmonary effort is normal  No respiratory distress  Breath sounds: Normal breath sounds  No wheezing or rales     Abdominal: General: Bowel sounds are normal  There is no distension  Palpations: Abdomen is soft  Tenderness: There is no abdominal tenderness  There is no guarding  Musculoskeletal:         General: No swelling, tenderness or deformity  Skin:     General: Skin is warm and dry  Capillary Refill: Capillary refill takes less than 2 seconds  Neurological:      Mental Status: He is alert and oriented to person, place, and time  Psychiatric:         Mood and Affect: Mood normal          Behavior: Behavior normal          Thought Content: Thought content normal          Judgment: Judgment normal          Additional Data:     Labs:  I have reviewed pertinent results     Results from last 7 days   Lab Units 01/23/21  0844   WBC Thousand/uL 6 92   HEMOGLOBIN g/dL 10 3*   HEMATOCRIT % 37 3   PLATELETS Thousands/uL 272   NEUTROS PCT % 65   LYMPHS PCT % 23   MONOS PCT % 8   EOS PCT % 3     Results from last 7 days   Lab Units 01/23/21  0610   SODIUM mmol/L 139   POTASSIUM mmol/L 4 1   CHLORIDE mmol/L 105   CO2 mmol/L 28   BUN mg/dL 11   CREATININE mg/dL 1 13   ANION GAP mmol/L 6   CALCIUM mg/dL 8 5   ALBUMIN g/dL 3 0*   TOTAL BILIRUBIN mg/dL 0 56   ALK PHOS U/L 62   ALT U/L 1,320*   AST U/L 161*   GLUCOSE RANDOM mg/dL 90     Results from last 7 days   Lab Units 01/22/21  1451   INR  1 25*         Results from last 7 days   Lab Units 01/21/21  0618   HEMOGLOBIN A1C % 5 8*             Imaging: I have reviewed pertinent imaging       Recent Cultures (last 7 days):           Last 24 Hours Medication List:   Current Facility-Administered Medications   Medication Dose Route Frequency Provider Last Rate    acetaminophen  650 mg Oral Q4H PRN CHELSI Wetzel      aluminum-magnesium hydroxide-simethicone  30 mL Oral Q6H PRN CHELSI Wetzel      amLODIPine  5 mg Oral Daily [place] Pea, DO      aspirin  81 mg Oral Daily CHELSI Wetzel      atorvastatin  40 mg Oral QPM Sharath Johnson CHELSI Serrano      clopidogrel  75 mg Oral Daily CHELSI Wetzel      enoxaparin  30 mg Subcutaneous Q12H [address], DO      folic acid  1 mg Oral Daily CHELSI Wetzel      hydrOXYzine HCL  50 mg Oral TID PRN CHELSI Wetzel      iron sucrose  200 mg Intravenous Daily Chas Enamorado,  mg (01/23/21 0917)    Labetalol HCl  10 mg Intravenous Q6H PRN Chas Enamorado DO      lisinopril  20 mg Oral Daily Chas Enamorado, DO      metoclopramide  10 mg Intravenous Q6H PRN CHELSI Wetzel      mirtazapine  30 mg Oral HS CHELSI Wetzel      multivitamin-minerals  1 tablet Oral Daily Miguel Leong, [address]      nitroglycerin  0 4 mg Sublingual Q5 Min PRN CHELSI Wetzel      oxyCODONE  5 mg Oral Q6H PRN CHELSI Wetzel      senna  2 tablet Oral Daily PRN CHELSI Wetzel      thiamine  100 mg Oral Daily CHELSI Wetzel          Today, Patient Was Seen By: Chas Enamorado DO    ** Please Note: Dictation voice to text software may have been used in the creation of this document   **

## 2021-01-23 NOTE — NURSING NOTE
Patient has been confused, forgetful and disorganized this a m, has impulsively pulled off IV twice this shift  Apologetic and expressed concerns he will continue to pull out saline lock due to mental state  IV line to be placed this evening due to IV fluid being discontinued at this time and venofer infusion in a m   Discussed with charge RN and M D

## 2021-01-24 PROBLEM — R74.01 TRANSAMINITIS: Status: ACTIVE | Noted: 2021-01-21

## 2021-01-24 LAB
ALBUMIN SERPL BCP-MCNC: 3.2 G/DL (ref 3.5–5)
ALP SERPL-CCNC: 67 U/L (ref 46–116)
ALT SERPL W P-5'-P-CCNC: 1042 U/L (ref 12–78)
ANION GAP SERPL CALCULATED.3IONS-SCNC: 6 MMOL/L (ref 4–13)
AST SERPL W P-5'-P-CCNC: 108 U/L (ref 5–45)
BILIRUB DIRECT SERPL-MCNC: 0.18 MG/DL (ref 0–0.2)
BILIRUB SERPL-MCNC: 0.57 MG/DL (ref 0.2–1)
BUN SERPL-MCNC: 14 MG/DL (ref 5–25)
CALCIUM SERPL-MCNC: 9.1 MG/DL (ref 8.3–10.1)
CHLORIDE SERPL-SCNC: 104 MMOL/L (ref 100–108)
CO2 SERPL-SCNC: 30 MMOL/L (ref 21–32)
CREAT SERPL-MCNC: 1.25 MG/DL (ref 0.6–1.3)
ERYTHROCYTE [DISTWIDTH] IN BLOOD BY AUTOMATED COUNT: 19.6 % (ref 11.6–15.1)
GFR SERPL CREATININE-BSD FRML MDRD: 71 ML/MIN/1.73SQ M
GLUCOSE SERPL-MCNC: 77 MG/DL (ref 65–140)
GLUCOSE SERPL-MCNC: 88 MG/DL (ref 65–140)
HCT VFR BLD AUTO: 40 % (ref 36.5–49.3)
HGB BLD-MCNC: 11 G/DL (ref 12–17)
MCH RBC QN AUTO: 19.4 PG (ref 26.8–34.3)
MCHC RBC AUTO-ENTMCNC: 27.5 G/DL (ref 31.4–37.4)
MCV RBC AUTO: 70 FL (ref 82–98)
PLATELET # BLD AUTO: 290 THOUSANDS/UL (ref 149–390)
PMV BLD AUTO: 9.6 FL (ref 8.9–12.7)
POTASSIUM SERPL-SCNC: 3.8 MMOL/L (ref 3.5–5.3)
PROT SERPL-MCNC: 7.4 G/DL (ref 6.4–8.2)
RBC # BLD AUTO: 5.68 MILLION/UL (ref 3.88–5.62)
SODIUM SERPL-SCNC: 140 MMOL/L (ref 136–145)
WBC # BLD AUTO: 8.42 THOUSAND/UL (ref 4.31–10.16)

## 2021-01-24 PROCEDURE — 99232 SBSQ HOSP IP/OBS MODERATE 35: CPT | Performed by: INTERNAL MEDICINE

## 2021-01-24 PROCEDURE — 80048 BASIC METABOLIC PNL TOTAL CA: CPT | Performed by: INTERNAL MEDICINE

## 2021-01-24 PROCEDURE — 82948 REAGENT STRIP/BLOOD GLUCOSE: CPT

## 2021-01-24 PROCEDURE — 85027 COMPLETE CBC AUTOMATED: CPT | Performed by: INTERNAL MEDICINE

## 2021-01-24 PROCEDURE — 80076 HEPATIC FUNCTION PANEL: CPT | Performed by: INTERNAL MEDICINE

## 2021-01-24 RX ADMIN — ENOXAPARIN SODIUM 30 MG: 30 INJECTION SUBCUTANEOUS at 08:54

## 2021-01-24 RX ADMIN — Medication 1 TABLET: at 08:55

## 2021-01-24 RX ADMIN — ENOXAPARIN SODIUM 30 MG: 30 INJECTION SUBCUTANEOUS at 21:56

## 2021-01-24 RX ADMIN — THIAMINE HCL TAB 100 MG 100 MG: 100 TAB at 08:54

## 2021-01-24 RX ADMIN — MIRTAZAPINE 30 MG: 15 TABLET, FILM COATED ORAL at 21:58

## 2021-01-24 RX ADMIN — IRON SUCROSE 200 MG: 20 INJECTION, SOLUTION INTRAVENOUS at 08:59

## 2021-01-24 RX ADMIN — ASPIRIN 81 MG: 81 TABLET, COATED ORAL at 08:55

## 2021-01-24 RX ADMIN — LISINOPRIL 20 MG: 20 TABLET ORAL at 08:55

## 2021-01-24 RX ADMIN — FOLIC ACID 1 MG: 1 TABLET ORAL at 08:55

## 2021-01-24 RX ADMIN — AMLODIPINE BESYLATE 5 MG: 5 TABLET ORAL at 08:55

## 2021-01-24 RX ADMIN — ATORVASTATIN CALCIUM 40 MG: 40 TABLET, FILM COATED ORAL at 17:38

## 2021-01-24 RX ADMIN — CLOPIDOGREL BISULFATE 75 MG: 75 TABLET ORAL at 08:55

## 2021-01-24 NOTE — ASSESSMENT & PLAN NOTE
CT head: Subacute left MCA territory infarction, no evidence of hemorrhagic conversion  Is on stroke pathway  Echo without regional wall motion abnormalities  Given timing of symptoms, no need for permissive hypertension per Neurology  Unfortunately patient was unable to fit in MRI  Continue aspirin, statin  Appreciate Neurology recommendations  ANA on Monday , NPO tonight

## 2021-01-24 NOTE — ASSESSMENT & PLAN NOTE
Patient did not require any medications for CIWA protocol for greater than 72 hours, discontinued  Continue thiamine and folate

## 2021-01-24 NOTE — ASSESSMENT & PLAN NOTE
Patient was unable to get off the floor PTA  CK downtrended significantly   Patient is tolerating oral diet  Monitor off IV fluids

## 2021-01-24 NOTE — PROGRESS NOTES
Progress Note - Genesis Hong 1980, 41 y o  male MRN: [id number]    Unit/Bed#: E4 -01 Encounter: [id number]    Primary Care Provider: No primary care provider on file     Date and time admitted to hospital: 1/20/2021 10:49 PM        * Acute CVA (cerebrovascular accident) [facility])  Assessment & Plan  CT head: Subacute left MCA territory infarction, no evidence of hemorrhagic conversion  Is on stroke pathway  Echo without regional wall motion abnormalities  Given timing of symptoms, no need for permissive hypertension per Neurology  Unfortunately patient was unable to fit in MRI  Continue aspirin, statin  Appreciate Neurology recommendations  ANA on Monday , NPO tonight    Super obese  Assessment & Plan  Weight loss, dietary and lifestyle modifications    Rhabdomyolysis  Assessment & Plan  Patient was unable to get off the floor PTA  CK downtrended significantly   Patient is tolerating oral diet  Monitor off IV fluids    Hypertensive urgency  Assessment & Plan  Continue amlodipine and lisinopril  Prn labetalol       Microcytic anemia  Assessment & Plan  Microcytic anemia with iron deficiency anemia  Likely secondary to poor nutrition in setting of alcoholism  Will provide IV iron    Transaminitis  Assessment & Plan  Likely related to ischemic hepatitis, drug-induced  Ultrasound with hepatic steatosis  LFTs trending down  Continue to monitor  Appreciate GI recommendations    Drug abuse ([address] )  Assessment & Plan  Reports taking Percocet tablets and anabolic steroids  Host referral    Alcohol abuse  Assessment & Plan  Patient did not require any medications for CIWA protocol for greater than 72 hours, discontinued  Continue thiamine and folate    MDD (major depressive disorder), recurrent episode, moderate (HCC)  Assessment & Plan  Continue home Remeron and Atarax      VTE Pharmacologic Prophylaxis:  Lovenox  Pharmacologic: Enoxaparin (Lovenox)  Mechanical VTE Prophylaxis in Place: Yes    Patient Centered Rounds: I have performed bedside rounds with nursing staff today  Time Spent for Care: 30 minutes  More than 50% of total time spent on counseling and coordination of care as described above  Current Length of Stay: 3 day(s)    Current Patient Status: Inpatient   Certification Statement: The patient will continue to require additional inpatient hospital stay due to Inpatient procedure    Discharge Plan:  Pending ANA, PT OT    Code Status: Level 1 - Full Code      Subjective:   Patient seen and evaluated at bedside  Reports that he still does not feel altogether     Is having difficulty with reading but is improved from admission  Objective:     Vitals:   Temp (24hrs), Av 5 °F (36 4 °C), Min:97 3 °F (36 3 °C), Max:97 6 °F (36 4 °C)    Temp:  [97 3 °F (36 3 °C)-97 6 °F (36 4 °C)] 97 3 °F (36 3 °C)  HR:  [64-74] 68  Resp:  [18-20] 18  BP: (136-179)/(72-91) 148/76  SpO2:  [95 %-98 %] 96 %  Body mass index is 48 18 kg/m²  Input and Output Summary (last 24 hours): Intake/Output Summary (Last 24 hours) at 2021 1605  Last data filed at 2021 0730  Gross per 24 hour   Intake 120 ml   Output 1050 ml   Net -930 ml       Physical Exam:     Physical Exam  Constitutional:       General: He is not in acute distress  Appearance: He is well-developed  He is not diaphoretic  HENT:      Head: Normocephalic and atraumatic  Mouth/Throat:      Pharynx: No oropharyngeal exudate  Eyes:      General: No scleral icterus  Conjunctiva/sclera: Conjunctivae normal       Pupils: Pupils are equal, round, and reactive to light  Cardiovascular:      Rate and Rhythm: Normal rate and regular rhythm  Heart sounds: Normal heart sounds  No murmur  Pulmonary:      Effort: Pulmonary effort is normal  No respiratory distress  Breath sounds: Normal breath sounds  No wheezing or rales  Abdominal:      General: Bowel sounds are normal  There is no distension  Palpations: Abdomen is soft  Tenderness: There is no abdominal tenderness  There is no guarding  Musculoskeletal:         General: No tenderness or deformity  Skin:     General: Skin is warm and dry  Capillary Refill: Capillary refill takes less than 2 seconds  Neurological:      Mental Status: He is alert and oriented to person, place, and time  Psychiatric:         Behavior: Behavior normal          Thought Content: Thought content normal          Judgment: Judgment normal          Additional Data:     Labs:  I have reviewed pertinent results     Results from last 7 days   Lab Units 01/24/21  0240 01/23/21  0844   WBC Thousand/uL 8 42 6 92   HEMOGLOBIN g/dL 11 0* 10 3*   HEMATOCRIT % 40 0 37 3   PLATELETS Thousands/uL 290 272   NEUTROS PCT %  --  65   LYMPHS PCT %  --  23   MONOS PCT %  --  8   EOS PCT %  --  3     Results from last 7 days   Lab Units 01/24/21  0240   SODIUM mmol/L 140   POTASSIUM mmol/L 3 8   CHLORIDE mmol/L 104   CO2 mmol/L 30   BUN mg/dL 14   CREATININE mg/dL 1 25   ANION GAP mmol/L 6   CALCIUM mg/dL 9 1   ALBUMIN g/dL 3 2*   TOTAL BILIRUBIN mg/dL 0 57   ALK PHOS U/L 67   ALT U/L 1,042*   AST U/L 108*   GLUCOSE RANDOM mg/dL 88     Results from last 7 days   Lab Units 01/22/21  1451   INR  1 25*         Results from last 7 days   Lab Units 01/21/21  0618   HEMOGLOBIN A1C % 5 8*             Imaging: I have reviewed pertinent imaging       Recent Cultures (last 7 days):           Last 24 Hours Medication List:   Current Facility-Administered Medications   Medication Dose Route Frequency Provider Last Rate    acetaminophen  650 mg Oral Q4H PRN CHELSI Sheriff      aluminum-magnesium hydroxide-simethicone  30 mL Oral Q6H PRN CHELSI Sheriff      amLODIPine  5 mg Oral Daily Emily Carr DO      aspirin  81 mg Oral Daily CHELSI Sheriff      atorvastatin  40 mg Oral QPM CHELSI Sheriff      clopidogrel  75 mg Oral Daily CHELSI Sheriff      enoxaparin  30 mg Subcutaneous Q12H [address], DO      folic acid  1 mg Oral Daily Shirlie Erika, CRNP      hydrOXYzine HCL  50 mg Oral TID PRN Shirlie Erika, CRNP      Labetalol HCl  10 mg Intravenous Q6H PRN Rain Hardin, DO      lisinopril  20 mg Oral Daily Rain Hardin, DO      metoclopramide  10 mg Intravenous Q6H PRN Shirlie Erika, CRNP      mirtazapine  30 mg Oral HS Shirlie Erika, CRNP      multivitamin-minerals  1 tablet Oral Daily Shirlie Erika, [address]      nitroglycerin  0 4 mg Sublingual Q5 Min PRN Shirlie Erika, CRNP      oxyCODONE  5 mg Oral Q6H PRN Shirlie Erika, CRNP      senna  2 tablet Oral Daily PRN Shirlie Erika, CRNP      thiamine  100 mg Oral Daily Shirlie Erika, CRNP          Today, Patient Was Seen By: Rain Hardin DO    ** Please Note: Dictation voice to text software may have been used in the creation of this document   **

## 2021-01-24 NOTE — PLAN OF CARE
Problem: Neurological Deficit  Goal: Neurological status is stable or improving  Description: Interventions:  - Monitor and assess patient's level of consciousness, motor function, sensory function, and level of assistance needed for ADLs  - Monitor and report changes from baseline  Collaborate with interdisciplinary team to initiate plan and implement interventions as ordered  - Provide and maintain a safe environment  - Consider seizure precautions  - Consider fall precautions  - Consider aspiration precautions  - Consider bleeding precautions  Outcome: Progressing     Problem: Activity Intolerance/Impaired Mobility  Goal: Mobility/activity is maintained at optimum level for patient  Description: Interventions:  - Assess and monitor patient  barriers to mobility and need for assistive/adaptive devices  - Assess patient's emotional response to limitations  - Collaborate with interdisciplinary team and initiate plans and interventions as ordered  - Encourage independent activity per ability   - Maintain proper body alignment  - Perform active/passive rom as tolerated/ordered  - Plan activities to conserve energy   - Turn patient as appropriate  Outcome: Progressing     Problem: Communication Impairment  Goal: Ability to express needs and understand communication  Description: Assess patient's communication skills and ability to understand information  Patient will demonstrate use of effective communication techniques, alternative methods of communication and understanding even if not able to speak  - Encourage communication and provide alternate methods of communication as needed  - Collaborate with case management/ for discharge needs  - Include patient/family/caregiver in decisions related to communication    Outcome: Progressing     Problem: Potential for Aspiration  Goal: Non-ventilated patient's risk of aspiration is minimized  Description: Assess and monitor vital signs, respiratory status, and labs (WBC)  Monitor for signs of aspiration (tachypnea, cough, rales, wheezing, cyanosis, fever)  - Assess and monitor patient's ability to swallow  - Place patient up in chair to eat if possible  - HOB up at 90 degrees to eat if unable to get patient up into chair   - Supervise patient during oral intake  - Instruct patient/ family to take small bites  - Instruct patient/ family to take small single sips when taking liquids  - Follow patient-specific strategies generated by speech pathologist   Outcome: Progressing  Goal: Ventilated patient's risk of aspiration is minimized  Description: Assess and monitor vital signs, respiratory status, airway cuff pressure, and labs (WBC)  Monitor for signs of aspiration (tachypnea, cough, rales, wheezing, cyanosis, fever)  - Elevate head of bed 30 degrees if patient has tube feeding   - Monitor tube feeding  Outcome: Progressing     Problem: Nutrition  Goal: Nutrition/Hydration status is improving  Description: Monitor and assess patient's nutrition/hydration status for malnutrition (ex- brittle hair, bruises, dry skin, pale skin and conjunctiva, muscle wasting, smooth red tongue, and disorientation)  Collaborate with interdisciplinary team and initiate plan and interventions as ordered  Monitor patient's weight and dietary intake as ordered or per policy  Utilize nutrition screening tool and intervene per policy  Determine patient's food preferences and provide high-protein, high-caloric foods as appropriate  - Assist patient with eating   - Allow adequate time for meals   - Encourage patient to take dietary supplement as ordered  - Collaborate with clinical nutritionist   - Include patient/family/caregiver in decisions related to nutrition    Outcome: Progressing     Problem: PAIN - ADULT  Goal: Verbalizes/displays adequate comfort level or baseline comfort level  Description: Interventions:  - Encourage patient to monitor pain and request assistance  - Assess pain using appropriate pain scale  - Administer analgesics based on type and severity of pain and evaluate response  - Implement non-pharmacological measures as appropriate and evaluate response  - Consider cultural and social influences on pain and pain management  - Notify physician/advanced practitioner if interventions unsuccessful or patient reports new pain  Outcome: Progressing     Problem: INFECTION - ADULT  Goal: Absence or prevention of progression during hospitalization  Description: INTERVENTIONS:  - Assess and monitor for signs and symptoms of infection  - Monitor lab/diagnostic results  - Monitor all insertion sites, i e  indwelling lines, tubes, and drains  - Monitor endotracheal if appropriate and nasal secretions for changes in amount and color  - [place] appropriate cooling/warming therapies per order  - Administer medications as ordered  - Instruct and encourage patient and family to use good hand hygiene technique  - Identify and instruct in appropriate isolation precautions for identified infection/condition  Outcome: Progressing     Problem: SAFETY ADULT  Goal: Patient will remain free of falls  Description: INTERVENTIONS:  - Assess patient frequently for physical needs  -  Identify cognitive and physical deficits and behaviors that affect risk of falls    -  [place] fall precautions as indicated by assessment   - Educate patient/family on patient safety including physical limitations  - Instruct patient to call for assistance with activity based on assessment  - Modify environment to reduce risk of injury  - Consider OT/PT consult to assist with strengthening/mobility  Outcome: Progressing  Goal: Maintain or return to baseline ADL function  Description: INTERVENTIONS:  -  Assess patient's ability to carry out ADLs; assess patient's baseline for ADL function and identify physical deficits which impact ability to perform ADLs (bathing, care of mouth/teeth, toileting, grooming, dressing, etc )  - Assess/evaluate cause of self-care deficits   - Assess range of motion  - Assess patient's mobility; develop plan if impaired  - Assess patient's need for assistive devices and provide as appropriate  - Encourage maximum independence but intervene and supervise when necessary  - Involve family in performance of ADLs  - Assess for home care needs following discharge   - Consider OT consult to assist with ADL evaluation and planning for discharge  - Provide patient education as appropriate  Outcome: Progressing  Goal: Maintain or return mobility status to optimal level  Description: INTERVENTIONS:  - Assess patient's baseline mobility status (ambulation, transfers, stairs, etc )    - Identify cognitive and physical deficits and behaviors that affect mobility  - Identify mobility aids required to assist with transfers and/or ambulation (gait belt, sit-to-stand, lift, walker, cane, etc )  - [place] fall precautions as indicated by assessment  - Record patient progress and toleration of activity level on Mobility SBAR; progress patient to next Phase/Stage  - Instruct patient to call for assistance with activity based on assessment  - Consider rehabilitation consult to assist with strengthening/weightbearing, etc   Outcome: Progressing     Problem: DISCHARGE PLANNING  Goal: Discharge to home or other facility with appropriate resources  Description: INTERVENTIONS:  - Identify barriers to discharge w/patient and caregiver  - Arrange for needed discharge resources and transportation as appropriate  - Identify discharge learning needs (meds, wound care, etc )  - Arrange for interpretive services to assist at discharge as needed  - Refer to Case Management Department for coordinating discharge planning if the patient needs post-hospital services based on physician/advanced practitioner order or complex needs related to functional status, cognitive ability, or social support system  Outcome: Progressing     Problem: Knowledge Deficit  Goal: Patient/family/caregiver demonstrates understanding of disease process, treatment plan, medications, and discharge instructions  Description: Complete learning assessment and assess knowledge base  Interventions:  - Provide teaching at level of understanding  - Provide teaching via preferred learning methods  Outcome: Progressing     Problem: Potential for Falls  Goal: Patient will remain free of falls  Description: INTERVENTIONS:  - Assess patient frequently for physical needs  -  Identify cognitive and physical deficits and behaviors that affect risk of falls    -  [place] fall precautions as indicated by assessment   - Educate patient/family on patient safety including physical limitations  - Instruct patient to call for assistance with activity based on assessment  - Modify environment to reduce risk of injury  - Consider OT/PT consult to assist with strengthening/mobility  Outcome: Progressing

## 2021-01-25 ENCOUNTER — ANESTHESIA (INPATIENT)
Dept: NON INVASIVE DIAGNOSTICS | Facility: HOSPITAL | Age: 41
DRG: 064 | End: 2021-01-25

## 2021-01-25 ENCOUNTER — ANESTHESIA EVENT (INPATIENT)
Dept: NON INVASIVE DIAGNOSTICS | Facility: HOSPITAL | Age: 41
DRG: 064 | End: 2021-01-25

## 2021-01-25 ENCOUNTER — APPOINTMENT (OUTPATIENT)
Dept: NON INVASIVE DIAGNOSTICS | Facility: HOSPITAL | Age: 41
DRG: 064 | End: 2021-01-25
Attending: PSYCHIATRY & NEUROLOGY

## 2021-01-25 VITALS — HEART RATE: 94 BPM

## 2021-01-25 PROBLEM — R79.89 ELEVATED TROPONIN: Status: ACTIVE | Noted: 2021-01-21

## 2021-01-25 PROBLEM — R77.8 ELEVATED TROPONIN: Status: ACTIVE | Noted: 2021-01-21

## 2021-01-25 LAB
ALBUMIN SERPL BCP-MCNC: 3.1 G/DL (ref 3.5–5)
ALP SERPL-CCNC: 63 U/L (ref 46–116)
ALT SERPL W P-5'-P-CCNC: 695 U/L (ref 12–78)
ANION GAP SERPL CALCULATED.3IONS-SCNC: 9 MMOL/L (ref 4–13)
APTT SCREEN TO CONFIRM RATIO: 0.92 RATIO (ref 0–1.4)
AST SERPL W P-5'-P-CCNC: 56 U/L (ref 5–45)
B2 GLYCOPROT1 IGA SER-ACNC: <9 GPI IGA UNITS (ref 0–25)
B2 GLYCOPROT1 IGG SER-ACNC: <9 GPI IGG UNITS (ref 0–20)
B2 GLYCOPROT1 IGM SER-ACNC: <9 GPI IGM UNITS (ref 0–32)
BILIRUB SERPL-MCNC: 0.66 MG/DL (ref 0.2–1)
BUN SERPL-MCNC: 15 MG/DL (ref 5–25)
CALCIUM ALBUM COR SERPL-MCNC: 9.9 MG/DL (ref 8.3–10.1)
CALCIUM SERPL-MCNC: 9.2 MG/DL (ref 8.3–10.1)
CHLORIDE SERPL-SCNC: 105 MMOL/L (ref 100–108)
CO2 SERPL-SCNC: 28 MMOL/L (ref 21–32)
CONFIRM APTT/NORMAL: 46.2 SEC (ref 0–55)
CREAT SERPL-MCNC: 1.17 MG/DL (ref 0.6–1.3)
ERYTHROCYTE [DISTWIDTH] IN BLOOD BY AUTOMATED COUNT: 20.5 % (ref 11.6–15.1)
F5 GENE MUT ANL BLD/T: NORMAL
GFR SERPL CREATININE-BSD FRML MDRD: 76 ML/MIN/1.73SQ M
GLUCOSE SERPL-MCNC: 96 MG/DL (ref 65–140)
GLUCOSE SERPL-MCNC: 97 MG/DL (ref 65–140)
HCT VFR BLD AUTO: 40.1 % (ref 36.5–49.3)
HGB BLD-MCNC: 10.9 G/DL (ref 12–17)
LA PPP-IMP: NORMAL
MCH RBC QN AUTO: 19.5 PG (ref 26.8–34.3)
MCHC RBC AUTO-ENTMCNC: 27.2 G/DL (ref 31.4–37.4)
MCV RBC AUTO: 72 FL (ref 82–98)
PLATELET # BLD AUTO: 318 THOUSANDS/UL (ref 149–390)
PMV BLD AUTO: 10.1 FL (ref 8.9–12.7)
POTASSIUM SERPL-SCNC: 4.2 MMOL/L (ref 3.5–5.3)
PROT C AG ACT/NOR PPP IA: 66 % OF NORMAL (ref 60–150)
PROT S ACT/NOR PPP: 68 % (ref 71–117)
PROT SERPL-MCNC: 7.3 G/DL (ref 6.4–8.2)
RBC # BLD AUTO: 5.6 MILLION/UL (ref 3.88–5.62)
SCREEN APTT: 45.1 SEC (ref 0–51.9)
SCREEN DRVVT: 35.4 SEC (ref 0–47)
SODIUM SERPL-SCNC: 142 MMOL/L (ref 136–145)
THROMBIN TIME: 19.1 SEC (ref 0–23)
WBC # BLD AUTO: 9.26 THOUSAND/UL (ref 4.31–10.16)

## 2021-01-25 PROCEDURE — B24BZZ4 ULTRASONOGRAPHY OF HEART WITH AORTA, TRANSESOPHAGEAL: ICD-10-PCS | Performed by: INTERNAL MEDICINE

## 2021-01-25 PROCEDURE — 85027 COMPLETE CBC AUTOMATED: CPT | Performed by: INTERNAL MEDICINE

## 2021-01-25 PROCEDURE — 82948 REAGENT STRIP/BLOOD GLUCOSE: CPT

## 2021-01-25 PROCEDURE — 80053 COMPREHEN METABOLIC PANEL: CPT | Performed by: INTERNAL MEDICINE

## 2021-01-25 PROCEDURE — 99232 SBSQ HOSP IP/OBS MODERATE 35: CPT | Performed by: INTERNAL MEDICINE

## 2021-01-25 PROCEDURE — 93312 ECHO TRANSESOPHAGEAL: CPT

## 2021-01-25 PROCEDURE — 99232 SBSQ HOSP IP/OBS MODERATE 35: CPT | Performed by: PHYSICIAN ASSISTANT

## 2021-01-25 PROCEDURE — 99232 SBSQ HOSP IP/OBS MODERATE 35: CPT | Performed by: PSYCHIATRY & NEUROLOGY

## 2021-01-25 RX ORDER — HYDROMORPHONE HCL/PF 1 MG/ML
0.5 SYRINGE (ML) INJECTION
Status: CANCELLED | OUTPATIENT
Start: 2021-01-25

## 2021-01-25 RX ORDER — SODIUM CHLORIDE 9 MG/ML
125 INJECTION, SOLUTION INTRAVENOUS CONTINUOUS
Status: CANCELLED | OUTPATIENT
Start: 2021-01-25

## 2021-01-25 RX ORDER — MIDAZOLAM HYDROCHLORIDE 2 MG/2ML
INJECTION, SOLUTION INTRAMUSCULAR; INTRAVENOUS AS NEEDED
Status: DISCONTINUED | OUTPATIENT
Start: 2021-01-25 | End: 2021-01-25

## 2021-01-25 RX ORDER — HYDROMORPHONE HCL/PF 1 MG/ML
0.5 SYRINGE (ML) INJECTION
Status: DISCONTINUED | OUTPATIENT
Start: 2021-01-25 | End: 2021-01-25 | Stop reason: HOSPADM

## 2021-01-25 RX ORDER — ONDANSETRON 2 MG/ML
4 INJECTION INTRAMUSCULAR; INTRAVENOUS ONCE AS NEEDED
Status: CANCELLED | OUTPATIENT
Start: 2021-01-25

## 2021-01-25 RX ORDER — LIDOCAINE HYDROCHLORIDE 20 MG/ML
INJECTION, SOLUTION EPIDURAL; INFILTRATION; INTRACAUDAL; PERINEURAL AS NEEDED
Status: DISCONTINUED | OUTPATIENT
Start: 2021-01-25 | End: 2021-01-25

## 2021-01-25 RX ORDER — KETAMINE HYDROCHLORIDE 50 MG/ML
INJECTION, SOLUTION, CONCENTRATE INTRAMUSCULAR; INTRAVENOUS AS NEEDED
Status: DISCONTINUED | OUTPATIENT
Start: 2021-01-25 | End: 2021-01-25

## 2021-01-25 RX ORDER — HYDROCODONE POLISTIREX AND CHLORPHENIRAMINE POLISTIREX 10; 8 MG/5ML; MG/5ML
5 SUSPENSION, EXTENDED RELEASE ORAL ONCE
Status: COMPLETED | OUTPATIENT
Start: 2021-01-25 | End: 2021-01-25

## 2021-01-25 RX ORDER — SODIUM CHLORIDE 9 MG/ML
INJECTION, SOLUTION INTRAVENOUS CONTINUOUS PRN
Status: DISCONTINUED | OUTPATIENT
Start: 2021-01-25 | End: 2021-01-25

## 2021-01-25 RX ORDER — BENZONATATE 100 MG/1
200 CAPSULE ORAL 3 TIMES DAILY
Status: DISCONTINUED | OUTPATIENT
Start: 2021-01-25 | End: 2021-01-29 | Stop reason: HOSPADM

## 2021-01-25 RX ORDER — FENTANYL CITRATE/PF 50 MCG/ML
50 SYRINGE (ML) INJECTION
Status: DISCONTINUED | OUTPATIENT
Start: 2021-01-25 | End: 2021-01-25 | Stop reason: HOSPADM

## 2021-01-25 RX ORDER — GLYCOPYRROLATE 0.2 MG/ML
INJECTION INTRAMUSCULAR; INTRAVENOUS AS NEEDED
Status: DISCONTINUED | OUTPATIENT
Start: 2021-01-25 | End: 2021-01-25

## 2021-01-25 RX ORDER — ALBUTEROL SULFATE 2.5 MG/3ML
2.5 SOLUTION RESPIRATORY (INHALATION) ONCE
Status: COMPLETED | OUTPATIENT
Start: 2021-01-25 | End: 2021-01-25

## 2021-01-25 RX ORDER — SODIUM CHLORIDE 9 MG/ML
125 INJECTION, SOLUTION INTRAVENOUS CONTINUOUS
Status: DISCONTINUED | OUTPATIENT
Start: 2021-01-25 | End: 2021-01-25

## 2021-01-25 RX ORDER — ONDANSETRON 2 MG/ML
4 INJECTION INTRAMUSCULAR; INTRAVENOUS ONCE AS NEEDED
Status: DISCONTINUED | OUTPATIENT
Start: 2021-01-25 | End: 2021-01-25 | Stop reason: HOSPADM

## 2021-01-25 RX ORDER — PROPOFOL 10 MG/ML
INJECTION, EMULSION INTRAVENOUS AS NEEDED
Status: DISCONTINUED | OUTPATIENT
Start: 2021-01-25 | End: 2021-01-25

## 2021-01-25 RX ORDER — FENTANYL CITRATE/PF 50 MCG/ML
50 SYRINGE (ML) INJECTION
Status: CANCELLED | OUTPATIENT
Start: 2021-01-25

## 2021-01-25 RX ADMIN — ENOXAPARIN SODIUM 30 MG: 30 INJECTION SUBCUTANEOUS at 15:13

## 2021-01-25 RX ADMIN — ASPIRIN 81 MG: 81 TABLET, COATED ORAL at 08:41

## 2021-01-25 RX ADMIN — HYDROCODONE POLISTIREX AND CHLORPHENIRAMINE POLISTIREX 5 ML: 10; 8 SUSPENSION, EXTENDED RELEASE ORAL at 18:05

## 2021-01-25 RX ADMIN — THIAMINE HCL TAB 100 MG 100 MG: 100 TAB at 08:41

## 2021-01-25 RX ADMIN — PROPOFOL 50 MG: 10 INJECTION, EMULSION INTRAVENOUS at 12:55

## 2021-01-25 RX ADMIN — MIRTAZAPINE 30 MG: 15 TABLET, FILM COATED ORAL at 21:07

## 2021-01-25 RX ADMIN — PROPOFOL 50 MG: 10 INJECTION, EMULSION INTRAVENOUS at 12:48

## 2021-01-25 RX ADMIN — SODIUM CHLORIDE: 0.9 INJECTION, SOLUTION INTRAVENOUS at 12:08

## 2021-01-25 RX ADMIN — CLOPIDOGREL BISULFATE 75 MG: 75 TABLET ORAL at 08:41

## 2021-01-25 RX ADMIN — MIDAZOLAM 4 MG: 1 INJECTION INTRAMUSCULAR; INTRAVENOUS at 12:34

## 2021-01-25 RX ADMIN — GLYCOPYRROLATE 0.4 MG: 0.2 INJECTION, SOLUTION INTRAMUSCULAR; INTRAVENOUS at 12:21

## 2021-01-25 RX ADMIN — AMLODIPINE BESYLATE 5 MG: 5 TABLET ORAL at 08:41

## 2021-01-25 RX ADMIN — LISINOPRIL 20 MG: 20 TABLET ORAL at 08:41

## 2021-01-25 RX ADMIN — PROPOFOL 50 MG: 10 INJECTION, EMULSION INTRAVENOUS at 12:45

## 2021-01-25 RX ADMIN — ACETAMINOPHEN 650 MG: 325 TABLET ORAL at 17:11

## 2021-01-25 RX ADMIN — PROPOFOL 50 MG: 10 INJECTION, EMULSION INTRAVENOUS at 12:59

## 2021-01-25 RX ADMIN — PROPOFOL 50 MG: 10 INJECTION, EMULSION INTRAVENOUS at 12:44

## 2021-01-25 RX ADMIN — ATORVASTATIN CALCIUM 40 MG: 40 TABLET, FILM COATED ORAL at 17:11

## 2021-01-25 RX ADMIN — KETAMINE HYDROCHLORIDE 50 MG: 50 INJECTION INTRAMUSCULAR; INTRAVENOUS at 12:35

## 2021-01-25 RX ADMIN — PROPOFOL 50 MG: 10 INJECTION, EMULSION INTRAVENOUS at 12:51

## 2021-01-25 RX ADMIN — ENOXAPARIN SODIUM 30 MG: 30 INJECTION SUBCUTANEOUS at 21:06

## 2021-01-25 RX ADMIN — Medication 1 TABLET: at 08:41

## 2021-01-25 RX ADMIN — BENZONATATE 200 MG: 100 CAPSULE ORAL at 21:06

## 2021-01-25 RX ADMIN — FOLIC ACID 1 MG: 1 TABLET ORAL at 08:41

## 2021-01-25 RX ADMIN — LIDOCAINE HYDROCHLORIDE 5 ML: 20 INJECTION, SOLUTION EPIDURAL; INFILTRATION; INTRACAUDAL; PERINEURAL at 12:33

## 2021-01-25 RX ADMIN — ALBUTEROL SULFATE 2.5 MG: 2.5 SOLUTION RESPIRATORY (INHALATION) at 13:22

## 2021-01-25 NOTE — ASSESSMENT & PLAN NOTE
· Acute left MCA CVA unable to fit into MRI  · Seen by neurology  Transesophageal echocardiogram with bubble study today demonstrates PFO  · Continue aspirin and clopidogrel    Checking lower extremity duplex to rule out DVT

## 2021-01-25 NOTE — PROGRESS NOTES
Progress Note - Neurology   Ronda Abad 41 y o  male MRN: [id number]  Unit/Bed#: E4 -01 Encounter: [id number]      Assessment/Plan   41 y o  male with HTN, chronic daily alcohol use, drug use including Percocet, benzos and anabolic steroids, depression and anxiety who presented to the ED with confusion, word-finding difficulties, visual disturbance and mild R sided weakness and numbness after waking up on his floor, possibly lying there for 3 days  CTH on presentation confirmed L MCA infarct  Stroke workup underway  Suspect vasculopathy in the setting of performance enhancing drugs, but given bilaterally dilated atria, cannot exclude cardioembolic source referable to paroxysmal AFib  Also evaluating for hypercoagulable state given his young age  ADDENDUM TO BELOW PLAN:  Per Cardiology, moderate-sized PFO on ANA this afternoon (final ANA report is still pending); given this, will obtain lower extremity venous Dopplers to rule out DVT/paradoxical embolism, will also pursue inflammatory serum labs  Will need to further discuss with Cardiology next steps for stroke etiology investigation (loop monitor placement for cardiac arrhythmia versus interventional cardiology input for possible PFO closure given elevated ROPE score)  Rest of plan as previously mentioned below  Discussed this with primary team this afternoon  * Acute CVA (cerebrovascular accident) [facility])  Assessment & Plan  - stroke pathway ongoing:  - CTH demonstrated subacute left MCA territory infarction involving the left posterior frontal parietal region and left frontal lobe  No evidence of hemorrhagic conversion     - CTA head/neck demonstrated subtle under filling of the distal left MCA branches with no proximal obstructive disease or LVO  Otherwise unremarkable  - MRI brain unable to be performed due to patient's body habitus   CT was done about 3 days post symptoms onset - do not feel it necessary to order followup CT at this time   - 2D echo with bilaterally mildly dilated atria, EF 60%, no MAC   - ANA pending for today; if it is unrevealing for acute source of stroke, patient is to have cardiology team follow up in the coming weeks to discuss long term cardio-embolic stroke source surveillance (loop monitor, etc)  - Patient initially started on heparin drip due to concern for NSTEMI  Heparin has since been discontinued as per Cardiology not felt to have acute coronary syndrome  Continues on DAPT  From neurology standpoint, does not need DAPT long term as CTA was negative for significant intracranial stenosis (unsure if patient needs DAPT from cardiac standpoint, defer to cardiology team in regards to this)  - Atorvastatin 40mg  - thrombosis panel, following results  -hemoglobin A1C of 5 8, lipid panel with LDL of 46  - goal normotension  - Telemetry  - Secondary risk factor modification   - PT/OT/ST    Rhabdomyolysis  Assessment & Plan  - Reports of collapsing to the ground and lying on the floor for 3 days  - [place] 29835 on presentation, trending downwards, last was 1053 on 01/23  - IVF  - Management as per primary team    Transaminitis  Assessment & Plan  - Chronic daily alcohol use  - AST/ALT 56/695  - Management as per GI/Primary team    Alcohol abuse  Assessment & Plan  - Daily alcohol use  - "At least 40 oz of beer or a 12 pack of beer plus liquor"  - States he relpased a few months ago with longest duration of sobriety being 5 months  - Labs consistent with liver failure, likely in the setting of chronic alcohol use  - [facility] protocol  - Thiamine, Folate    Early Bran will need follow up in in 4 weeks with neurovascular attending or advance practitioner  He will not require outpatient neurological testing  Will need possible cardiology follow up for loop monitor evaluation/discussion, neurology team will also need to follow pending thrombosis panel results       Subjective:   Patient resting in bed, still subjectively feels ok when it comes to memory/cognition and functioning compared to normal; does feel today it may be attributable to not being able to eat anything since yesterday (awaiting ANA)  Still feels trouble remembering financial information, speech is still slightly staggered and non-fluent compared to normal  No new/acute focal deficits otherwise  Vitals: Blood pressure 169/98, pulse 71, temperature 97 9 °F (36 6 °C), temperature source Temporal, resp  rate 18, height 5' 9" (1 753 m), weight (!) 148 kg (326 lb 4 5 oz), SpO2 95 %  ,Body mass index is 48 18 kg/m²  Physical Exam:  Physical Exam  Constitutional:       Comments: Obese   HENT:      Head: Atraumatic  Eyes:      Extraocular Movements: Extraocular movements intact and EOM normal       Conjunctiva/sclera: Conjunctivae normal       Pupils: Pupils are equal, round, and reactive to light  Neck:      Musculoskeletal: Normal range of motion and neck supple  Cardiovascular:      Rate and Rhythm: Normal rate and regular rhythm  Pulmonary:      Effort: Pulmonary effort is normal       Breath sounds: Normal breath sounds  Neurological:      Coordination: Finger-Nose-Finger Test and Heel to [facility] Test normal         Neurologic Exam     Mental Status   Awake, alert, oriented, speech is not 100% fluent when reading NIH card sentences, but is without any clear word substitution error or gross aphasia  No dysarthria, following central and appendicular commands  Cranial Nerves     CN II   Visual fields full to confrontation  CN III, IV, VI   Pupils are equal, round, and reactive to light  Extraocular motions are normal      CN V   Facial sensation intact  CN VII   Facial expression full, symmetric       CN VIII   CN VIII normal      CN IX, X   CN IX normal    CN X normal      CN XI   CN XI normal      CN XII   CN XII normal      Motor Exam   Muscle bulk: normal  Overall muscle tone: normal  Right arm pronator drift: absent  Left arm pronator drift: absentAt most he has minimal R UE weakness compared to L UE (full strength)  Full LE strength throughout  Sensory Exam   Still with slightly diminished R sided arm and leg light touch/tactile stimuli sensation when compared to the L       Gait, Coordination, and Reflexes     Coordination   Finger to nose coordination: normal  Heel to shin coordination: normal    Tremor   Resting tremor: absent  Intention tremor: absentVolitional movements without clear ataxia or clumsiness  Lab, Imaging and other studies:   No new neuroimaging at time of evaluation     CBC:   Results from last 7 days   Lab Units 01/25/21  0508 01/24/21  0240 01/23/21  0844   WBC Thousand/uL 9 26 8 42 6 92   RBC Million/uL 5 60 5 68* 5 31   HEMOGLOBIN g/dL 10 9* 11 0* 10 3*   HEMATOCRIT % 40 1 40 0 37 3   MCV fL 72* 70* 70*   PLATELETS Thousands/uL 318 290 272   , BMP/CMP:   Results from last 7 days   Lab Units 01/25/21  0508 01/24/21  0240 01/23/21  0610   SODIUM mmol/L 142 140 139   POTASSIUM mmol/L 4 2 3 8 4 1   CHLORIDE mmol/L 105 104 105   CO2 mmol/L 28 30 28   BUN mg/dL 15 14 11   CREATININE mg/dL 1 17 1 25 1 13   CALCIUM mg/dL 9 2 9 1 8 5   AST U/L 56* 108* 161*   ALT U/L 695* 1,042* 1,320*   ALK PHOS U/L 63 67 62   EGFR ml/min/1 73sq m 76 71 80   , Vitamin B12:   Results from last 7 days   Lab Units 01/21/21  0618   VITAMIN B 12 pg/mL 2,191*   , HgBA1C:   Results from last 7 days   Lab Units 01/21/21  0618   HEMOGLOBIN A1C % 5 8*   , TSH:   , Coagulation:   Results from last 7 days   Lab Units 01/22/21  1451   INR  1 25*   , Lipid Profile:   Results from last 7 days   Lab Units 01/21/21  0618   HDL mg/dL 25*   LDL CALC mg/dL 46   TRIGLYCERIDES mg/dL 80   , Ammonia:   , Urinalysis:   Results from last 7 days   Lab Units 01/21/21  0145   COLOR UA  Yellow   CLARITY UA  Clear   SPEC GRAV UA  1 015   PH UA  7 0   LEUKOCYTES UA  Negative   NITRITE UA  Negative   GLUCOSE UA mg/dl Negative   KETONES UA mg/dl Negative   BILIRUBIN UA Negative   BLOOD UA  Trace*   , Drug Screen:   Results from last 7 days   Lab Units 01/21/21  0107   BARBITURATE UR  Negative   BENZODIAZEPINE UR  Positive*   THC UR  Negative   COCAINE UR  Negative   METHADONE URINE  Negative   OPIATE UR  Negative   PCP UR  Negative   , Medication Drug Levels:       Invalid input(s): CARBAMAZEPINE,  PHENOBARB, LACOSAMIDE, OXCARBAZEPINE  VTE Prophylaxis: Sequential compression device (Venodyne)  and Enoxaparin (Lovenox)     Total time spent today 20 minutes

## 2021-01-25 NOTE — ASSESSMENT & PLAN NOTE
· Nontraumatic rhabdomyolysis secondary to being found down  · Resolved with IV fluids    Results from last 7 days   Lab Units 01/23/21  0610 01/21/21  0810 01/20/21  2332   CK TOTAL U/L 1,053* 5,776* 17,316*

## 2021-01-25 NOTE — ASSESSMENT & PLAN NOTE
· Non MI elevation troponin secondary to rhabdomyolysis  · Seen by Cardiology    Results from last 7 days   Lab Units 01/21/21  0618 01/21/21  0238 01/20/21  2332   TROPONIN I ng/mL 7 76* 10 00* 9 33*

## 2021-01-25 NOTE — SOCIAL WORK
LOS 4 days, no bundle, no 30 days readmission and unplanned readmission score is 17  Pt currently has no PCP listed and pt has no insurance  Is currently MA pending  Wrote number for [employer] on discharge instruction sheets  Pt lives in [facility] alone with dog in apartment with 2 full flights of steps to enter  Pt stated a friend is looking after his dog  Pt stated he was independent with ADLs, amb with no devices and is currently unemployed  Pt has no DME and was not open with any VNA  Pt uses [facility] in [facility]  Pt has hx of major depressive disorder, recurrent episode, moderate, alcohol abuse, drugs = percocet & anabolic steriods  Pt does not have a POA/Living Will   is his sister Karina Hart at [phone] that would make medical decisions for him  Pt would need to use the LFTY ride home  Offer HOST referral and pt refused  Pt stated he does not plan to drink alcohol any more  PT recommendation is IP Psych vs patient's prefers for rehab  Recommend equipment - continue to assess  Will continue to follow for discharge needs  Pt currently has no insurance and will need a LYFT ride home

## 2021-01-25 NOTE — PROGRESS NOTES
Cardiology Progress Note   MD Alisha Kinsey MD Kathryne Ferris, DO, [facility] MD Lewis Mccarthy DO, Judith Monahan DO, [facility]  ----------------------------------------------------------------  [address] [address]    Faby Galeano 41 y o  male MRN: [id number]  Unit/Bed#: E4 -01 Encounter: [id number]      ASSESSMENT:    Left MCA infarct   Rhabdomyolysis   Non MI troponin elevation secondary to rhabdomyolysis with peak at 10   LVEF 54%, grade 2 diastolic dysfunction with elevated LAP, mild biatrial dilatation, trace TR w/ PASP 50-55 mmHg, January 2021   Acute kidney injury   Hypertension   Morbid obesity   Alcohol/ narcotic abuse    PLAN:  Patient had acute left MCA infarct and we have been requested to see the patient to perform ANA  Elevated troponin related to rhabdomyolysis  Echocardiogram demonstrated normal left ventricular function by TTE  Risks, benefits and alternatives to ANA have been discussed at length including the risk of oral trauma, esophageal rupture, gastric rupture, pneumonia, ventilator dependence and death; patient understands these risks and wishes to proceed  Telemetry without evidence of significant arrhythmia  NPO for the procedure  Continue aspirin, statin, Plavix and lisinopril  Blood pressure recommendations as per Neurology in the acute setting  Maintain telemetry while inpatient to observe for arrhythmia  Alcohol cessation heavily advised  If ANA without evidence cardioembolic source, recommend outpatient CV follow-up for within 1-2 weeks for discussion of long-term monitor including event recorder loop recorder after discharge    Signed: Lissett Sanz DO, [facility]      History of Present Illness:  Patient seen and examined  Denies chest pain, pressure, tightness or squeezing  Denies lightheadedness, dizziness or palpitations    Denies lower extremity swelling, orthopnea or paroxysmal nocturnal dyspnea  Review of Systems:  Review of Systems   Constitution: Negative for decreased appetite, fever, weight gain and weight loss  HENT: Negative for congestion and sore throat  Eyes: Negative for visual disturbance  Cardiovascular: Negative for chest pain, dyspnea on exertion, leg swelling, near-syncope and palpitations  Respiratory: Negative for cough and shortness of breath  Hematologic/Lymphatic: Negative for bleeding problem  Skin: Negative for rash  Musculoskeletal: Negative for myalgias and neck pain  Gastrointestinal: Negative for abdominal pain and nausea  Neurological: Negative for light-headedness and weakness  Psychiatric/Behavioral: Negative for depression  No Known Allergies    No current facility-administered medications on file prior to encounter        Current Outpatient Medications on File Prior to Encounter   Medication Sig    hydrOXYzine HCL (ATARAX) 50 mg tablet Take 1 tablet (50 mg total) by mouth 3 (three) times a day as needed for anxiety    mirtazapine (REMERON) 30 mg tablet TAKE 1 TABLET (30 MG TOTAL) BY MOUTH DAILY AT BEDTIME    Prasterone, DHEA, 50 MG TABS Take by mouth        Current Facility-Administered Medications   Medication Dose Route Frequency Provider Last Rate    acetaminophen  650 mg Oral Q4H PRN Azalee Cloud, CRNP      aluminum-magnesium hydroxide-simethicone  30 mL Oral Q6H PRN Azalee Cloud, CRNP      amLODIPine  5 mg Oral Daily Ami Corolla, DO      aspirin  81 mg Oral Daily Azalee Cloud, CRNP      atorvastatin  40 mg Oral QPM Azalee Cloud, CRNP      clopidogrel  75 mg Oral Daily Azalee Cloud, CRNP      enoxaparin  30 mg Subcutaneous Q12H [address], DO      folic acid  1 mg Oral Daily Azalee Cloud, CRNP      hydrOXYzine HCL  50 mg Oral TID PRN Azalee Cloud, CRNP      Labetalol HCl  10 mg Intravenous Q6H PRN Ami Corolla, DO      lisinopril  20 mg Oral Daily Darcus Castor Laura,       metoclopramide  10 mg Intravenous Q6H PRN Stevo Rise, CRNP      mirtazapine  30 mg Oral HS Stevo Rise, CRNP      multivitamin-minerals  1 tablet Oral Daily Stevo Rise, [address]      nitroglycerin  0 4 mg Sublingual Q5 Min PRN Stevo Rise, CRNP      oxyCODONE  5 mg Oral Q6H PRN Stevo Rise, CRNP      senna  2 tablet Oral Daily PRN Stevo Rise, CRNP      thiamine  100 mg Oral Daily Stevo Rise, CRNP              Vitals:    01/24/21 1900 01/24/21 2336 01/25/21 0305 01/25/21 0728   BP: 165/83 166/99 161/98 169/98   BP Location: Right arm Right arm Right arm Left arm   Pulse: 79 73 65 71   Resp: 18 18 18 18   Temp: 97 5 °F (36 4 °C) 97 6 °F (36 4 °C) 97 9 °F (36 6 °C) 97 9 °F (36 6 °C)   TempSrc: Temporal Temporal Temporal Temporal   SpO2: 96% 99% 97% 95%   Weight:       Height:           No intake or output data in the 24 hours ending 01/25/21 1022    Weight change:     PHYSICAL EXAMINATION:  Gen: Awake, Alert, NAD  HEENT: AT/NC, Anicteric, mmm  Neck: Supple, No elevated JVP  Resp: CTA bilaterally no w/r/r  CV: RRR +S1, S2, No m/r/g  Abd: Soft, NT/ND + BS  Ext: warm, no LE edema bilaterally  Neuro:  Follows commands, moves all extermities  Psych: Appropriate affect    Lab Results:  Results from last 7 days   Lab Units 01/25/21  0508   WBC Thousand/uL 9 26   HEMOGLOBIN g/dL 10 9*   HEMATOCRIT % 40 1   PLATELETS Thousands/uL 318     Results from last 7 days   Lab Units 01/25/21  0508   POTASSIUM mmol/L 4 2   CHLORIDE mmol/L 105   CO2 mmol/L 28   BUN mg/dL 15   CREATININE mg/dL 1 17   CALCIUM mg/dL 9 2   ALK PHOS U/L 63   ALT U/L 695*   AST U/L 56*     No results found for: TROPONINT      Results from last 7 days   Lab Units 01/23/21  0610  01/21/21  0618 01/21/21  0238 01/20/21  2332   CK TOTAL U/L 1,053*   < >  --   --  17,316*   TROPONIN I ng/mL  --   --  7 76* 10 00* 9 33*   CK MB INDEX % <1 0   < >  --   --  <1 0    < > = values in this interval not displayed  Results from last 7 days   Lab Units 01/22/21  1451   INR  1 25*       Tele: SR    This note was completed in part utilizing M-Modal Fluency Direct Software  Grammatical errors, random word insertions, spelling mistakes, and incomplete sentences may be an occasional consequence of this system secondary to software limitations, ambient noise, and hardware issues  If you have any questions or concerns about the content, text, or information contained within the body of this dictation, please contact the provider for clarification

## 2021-01-25 NOTE — PROGRESS NOTES
Patient Name: Ronnie Richardson  Patient MRN: [id number]  Date: 01/25/21  Service: [facility]    Subjective   Patient reports "brain fog" since his stroke  Denies abdominal pain, gi complaints  Asking what kinds of vitamins and supplements will be okay to take once recovered - poor insight  Vitals  Blood pressure 169/98, pulse 71, temperature 97 9 °F (36 6 °C), temperature source Temporal, resp  rate 18, height 5' 9" (1 753 m), weight (!) 148 kg (326 lb 4 5 oz), SpO2 95 %  Physical Exam:     General Appearance:    Awake, alert, oriented x3, no distress, well developed, well    nourished   Head:    Normocephalic without obvious abnormality   Eyes:    PERRL, conjunctiva/corneas clear, EOM's intact        Neck:   Supple, no adenopathy   Throat:   Mucous membranes moist   Lungs:     Clear to auscultation bilaterally, no wheezing or rhonchi   Heart:    Regular rate and rhythm, S1 and S2 normal, no murmur   Abdomen:     Soft, obese, non-tender, non-distended  bowel sounds active  No  masses, rebound or guarding  Extremities:   Extremities without edema   Psych  Derm:   Normal affect    No jaundice   Neurologic:   CNII-XII grossly intact   Speech intact         Laboratory Studies  Results from last 7 days   Lab Units 01/25/21  0508 01/24/21  0240 01/23/21  0844 01/22/21  1451 01/21/21  0618 01/21/21  0104 01/20/21  2332   WBC Thousand/uL 9 26 8 42 6 92  --  9 77  --  9 45   HEMOGLOBIN g/dL 10 9* 11 0* 10 3*  --  9 0*  --  10 1*   HEMATOCRIT % 40 1 40 0 37 3  --  32 3*  --  35 5*   PLATELETS Thousands/uL 318 290 272  --  251  --  295   INR   --   --   --  1 25*  --  1 36*  --      Results from last 7 days   Lab Units 01/25/21  0508 01/24/21  0240 01/23/21  0610 01/22/21  1206 01/21/21  0618   SODIUM mmol/L 142 140 139 139 142   POTASSIUM mmol/L 4 2 3 8 4 1 3 8 4 0   CHLORIDE mmol/L 105 104 105 106 105   CO2 mmol/L 28 30 28 27 30   BUN mg/dL 15 14 11 12 17   CREATININE mg/dL 1 17 1 25 1 13 1 01 1 19 CALCIUM mg/dL 9 2 9 1 8 5 8 6 8 0*   ALBUMIN g/dL 3 1* 3 2* 3 0* 3 0* 2 9*   TOTAL BILIRUBIN mg/dL 0 66 0 57 0 56 0 83 0 94   ALK PHOS U/L 63 67 62 63 59   ALT U/L 695* 1,042* 1,320* 1,834* 2,552*   AST U/L 56* 108* 161* 266* 743*         Imaging and Other Studies      Inhouse Medications     Current Facility-Administered Medications:     acetaminophen (TYLENOL) tablet 650 mg, 650 mg, Oral, Q4H PRN    aluminum-magnesium hydroxide-simethicone (MYLANTA) oral suspension 30 mL, 30 mL, Oral, Q6H PRN    amLODIPine (NORVASC) tablet 5 mg, 5 mg, Oral, Daily, 5 mg at 01/25/21 0841    aspirin (ECOTRIN LOW STRENGTH) EC tablet 81 mg, 81 mg, Oral, Daily, 81 mg at 01/25/21 0841    atorvastatin (LIPITOR) tablet 40 mg, 40 mg, Oral, QPM, 40 mg at 01/24/21 1738    [COMPLETED] clopidogrel (PLAVIX) tablet 600 mg, 600 mg, Oral, Once, 600 mg at 01/21/21 0315 **AND** clopidogrel (PLAVIX) tablet 75 mg, 75 mg, Oral, Daily, 75 mg at 01/25/21 0841    enoxaparin (LOVENOX) subcutaneous injection 30 mg, 30 mg, Subcutaneous, Q12H MONICA, 30 mg at 14/46/20 9682    folic acid (FOLVITE) tablet 1 mg, 1 mg, Oral, Daily, 1 mg at 01/25/21 0841    hydrOXYzine HCL (ATARAX) tablet 50 mg, 50 mg, Oral, TID PRN, 50 mg at 01/21/21 1584    Labetalol HCl (NORMODYNE) injection 10 mg, 10 mg, Intravenous, Q6H PRN, 10 mg at 01/23/21 0109    lisinopril (ZESTRIL) tablet 20 mg, 20 mg, Oral, Daily, 20 mg at 01/25/21 0841    metoclopramide (REGLAN) injection 10 mg, 10 mg, Intravenous, Q6H PRN    mirtazapine (REMERON) tablet 30 mg, 30 mg, Oral, HS, 30 mg at 01/24/21 2158    multivitamin-minerals (CENTRUM) tablet 1 tablet, 1 tablet, Oral, Daily, 1 tablet at 01/25/21 0841    nitroglycerin (NITROSTAT) SL tablet 0 4 mg, 0 4 mg, Sublingual, Q5 Min PRN    oxyCODONE (ROXICODONE) IR tablet 5 mg, 5 mg, Oral, Q6H PRN    senna (SENOKOT) tablet 17 2 mg, 2 tablet, Oral, Daily PRN    thiamine (VITAMIN B1) tablet 100 mg, 100 mg, Oral, Daily, 100 mg at 01/25/21 9202      Assessment/Plan:    1  Elevated liver enzymes, hepatocellular pattern, likely multifactorial: DILI, rhabdo, shock/ischemic component, overall trending down  Continue alcohol/substance/supplement abstinence  Continue to trend LFTs QOD until resolved  2  Acute CVA for ANA today  Mgmt per Neurology            Kita Garcia PA-C

## 2021-01-25 NOTE — ANESTHESIA PREPROCEDURE EVALUATION
Procedure:  ANA    Relevant Problems   CARDIO   (+) Hypertensive urgency   (+) NSTEMI (non-ST elevated myocardial infarction) (HCC)      HEMATOLOGY   (+) Microcytic anemia      MUSCULOSKELETAL   (+) Rhabdomyolysis      NEURO/PSYCH   (+) Acute CVA (cerebrovascular accident) ([address] )   (+) Anxiety   (+) Generalized anxiety disorder   (+) MDD (major depressive disorder), recurrent episode, moderate (HCC)   (+) Obsessive-compulsive disorder with good or fair insight   (+) Social anxiety disorder        Physical Exam    Airway    Mallampati score: II  TM Distance: >3 FB  Neck ROM: full     Dental       Cardiovascular  Rhythm: regular, Rate: normal, Cardiovascular exam normal    Pulmonary  Pulmonary exam normal Breath sounds clear to auscultation,     Other Findings        Anesthesia Plan  ASA Score- 3     Anesthesia Type- general with ASA Monitors  Additional Monitors:   Airway Plan:           Plan Factors-    Chart reviewed  Induction- intravenous  Postoperative Plan-     Informed Consent- Anesthetic plan and risks discussed with patient

## 2021-01-25 NOTE — PROGRESS NOTES
Progress Note - Latoya Vincent 1980, 41 y o  male MRN: [id number]    Unit/Bed#: E4 -01 Encounter: [id number]    Primary Care Provider: No primary care provider on file  Date and time admitted to hospital: 1/20/2021 10:49 PM        * Acute CVA (cerebrovascular accident) [facility])  Assessment & Plan  · Acute left MCA CVA unable to fit into MRI  · Seen by neurology  Transesophageal echocardiogram with bubble study today demonstrates PFO  · Continue aspirin and clopidogrel  Checking lower extremity duplex to rule out DVT    Super obese  Assessment & Plan  · Body mass index is 48 18 kg/m²  Rhabdomyolysis  Assessment & Plan  · Nontraumatic rhabdomyolysis secondary to being found down  · Resolved with IV fluids    Results from last 7 days   Lab Units 01/23/21  0610 01/21/21  0810 01/20/21  2332   CK TOTAL U/L 1,053* 5,776* 17,316*       Hypertensive urgency  Assessment & Plan  · Hypertensive urgency started on amlodipine and lisinopril now with improved vitals    Transaminitis  Assessment & Plan  · Transaminitis secondary to hepatic steatosis alcohol use and rhabdomyolysis  · GI following    Results from last 7 days   Lab Units 01/25/21  0508 01/24/21  0240 01/23/21  0610 01/22/21  1206 01/21/21  0618 01/20/21  2332   AST U/L 56* 108* 161* 266* 743* 1,099*   ALT U/L 695* 1,042* 1,320* 1,834* 2,552* 3,144*   TOTAL BILIRUBIN mg/dL 0 66 0 57 0 56 0 83 0 94 1 01*       Drug abuse (HCC)  Assessment & Plan  · Alcohol, anabolic steroids, and percocet use  · Advised cessation    Alcohol abuse  Assessment & Plan  · Alcohol use without active withdrawal symptoms    · Continue thiamine folic acid    Elevated troponin  Assessment & Plan  · Non MI elevation troponin secondary to rhabdomyolysis  · Seen by Cardiology    Results from last 7 days   Lab Units 01/21/21  0618 01/21/21  0238 01/20/21  2332   TROPONIN I ng/mL 7 76* 10 00* 9 33*       VTE Pharmacologic Prophylaxis: VTE Score: 10 High Risk (Score >/= 5) - Pharmacological DVT Prophylaxis Ordered: Enoxaparin (Lovenox)  Sequential Compression Devices Ordered  Patient Centered Rounds: I have performed bedside rounds with nursing staff today  Discussions with Specialists or Other Care Team Provider:  Neurology    Education and Discussions with Family / Patient:     Time Spent for Care: 25 mins  More than 50% of total time spent on counseling and coordination of care as described above  Current Length of Stay: 4 day(s)  Current Patient Status: Inpatient   Certification Statement: The patient will continue to require additional inpatient hospital stay due to stroke  Discharge Plan / Estimated Discharge Date: Next 24 hours home    Code Status: Level 1 - Full Code      Subjective:   Patient seen and examined  Returns from ANA but having cough  No chest pain    Objective:   Vitals: Blood pressure 151/76, pulse 89, temperature 98 °F (36 7 °C), temperature source Temporal, resp  rate 18, height 5' 9" (1 753 m), weight (!) 148 kg (326 lb 4 5 oz), SpO2 96 %  Physical Exam  Vitals signs reviewed  Constitutional:       General: He is not in acute distress  HENT:      Head: Atraumatic  Eyes:      Extraocular Movements: Extraocular movements intact  Pupils: Pupils are equal, round, and reactive to light  Cardiovascular:      Rate and Rhythm: Regular rhythm  Heart sounds: Normal heart sounds  Pulmonary:      Effort: Pulmonary effort is normal       Breath sounds: Decreased breath sounds present  No wheezing  Abdominal:      General: Bowel sounds are normal       Palpations: Abdomen is soft  Tenderness: There is no abdominal tenderness  There is no rebound  Comments: obese   Musculoskeletal:         General: No swelling or tenderness  Skin:     General: Skin is warm and dry  Neurological:      Mental Status: He is alert and oriented to person, place, and time  Cranial Nerves: No cranial nerve deficit     Psychiatric:         Mood and Affect: Mood normal        Additional Data:   Labs:  Results from last 7 days   Lab Units 01/25/21  0508 01/24/21  0240 01/23/21  0844 01/22/21  1451 01/21/21  0618 01/21/21  0104 01/20/21  2332   WBC Thousand/uL 9 26 8 42 6 92  --  9 77  --  9 45   HEMOGLOBIN g/dL 10 9* 11 0* 10 3*  --  9 0*  --  10 1*   HEMATOCRIT % 40 1 40 0 37 3  --  32 3*  --  35 5*   MCV fL 72* 70* 70*  --  70*  --  69*   PLATELETS Thousands/uL 318 290 272  --  251  --  295   INR   --   --   --  1 25*  --  1 36*  --      Results from last 7 days   Lab Units 01/25/21  0508 01/24/21  0240 01/23/21  0610 01/22/21  1206 01/21/21  0618 01/20/21  2332   SODIUM mmol/L 142 140 139 139 142 141   POTASSIUM mmol/L 4 2 3 8 4 1 3 8 4 0 3 8   CHLORIDE mmol/L 105 104 105 106 105 102   CO2 mmol/L 28 30 28 27 30 31   ANION GAP mmol/L 9 6 6 6 7 8   BUN mg/dL 15 14 11 12 17 23   CREATININE mg/dL 1 17 1 25 1 13 1 01 1 19 1 34*   CALCIUM mg/dL 9 2 9 1 8 5 8 6 8 0* 9 0   ALBUMIN g/dL 3 1* 3 2* 3 0* 3 0* 2 9* 3 3*   TOTAL BILIRUBIN mg/dL 0 66 0 57 0 56 0 83 0 94 1 01*   ALK PHOS U/L 63 67 62 63 59 70   ALT U/L 695* 1,042* 1,320* 1,834* 2,552* 3,144*   AST U/L 56* 108* 161* 266* 743* 1,099*   EGFR ml/min/1 73sq m 76 71 80 91 75 65   GLUCOSE RANDOM mg/dL 97 88 90 111 83 89     Results from last 7 days   Lab Units 01/22/21  1206 01/21/21  0618   MAGNESIUM mg/dL 1 8 2 1     Results from last 7 days   Lab Units 01/23/21  0610 01/21/21  0810 01/21/21  0618 01/21/21  0238 01/20/21  2332   CK TOTAL U/L 1,053* 5,776*  --   --  17,316*   TROPONIN I ng/mL  --   --  7 76* 10 00* 9 33*   CK MB INDEX % <1 0 <1 0  --   --  <1 0     Results from last 7 days   Lab Units 01/22/21  1206   NT-PRO BNP pg/mL 148*          Results from last 7 days   Lab Units 01/25/21  1023 01/24/21  2137   POC GLUCOSE mg/dl 96 77     Results from last 7 days   Lab Units 01/21/21  0618   HEMOGLOBIN A1C % 5 8*         * I Have Reviewed All Lab Data Listed Above      Cultures:   Results from last 7 days Lab Units 01/20/21  2351   INFLUENZA A PCR  Negative       Results from last 7 days   Lab Units 01/20/21  2351   SARS-COV-2  Negative   INFLUENZA A PCR  Negative   INFLUENZA B PCR  Negative   RSV PCR  Negative           Lines/Drains:  Invasive Devices     Peripheral Intravenous Line            Peripheral IV 01/24/21 Left Antecubital 1 day              Telemetry:   Telemetry Orders (From admission, onward)             48 Hour Telemetry Monitoring  Continuous x 48 hours     Question:  Reason for 48 Hour Telemetry  Answer:  Acute CVA (<24 hrs old, hemispheric strokes, selected brainstem strokes, cardiac arrhythmias)                  Imaging:  Imaging Reports Reviewed Today Include:   Cta Head And Neck W Wo Contrast    Result Date: 1/21/2021  Impression: Stable subacute ischemia left MCA distribution, embolic in nature based on discontiguous foci of involvement  No nancy hemorrhage, no change in minimal mass effect  No large vessel flow restrictive disease within the head or neck  Slightly diminished distal left MCA branches  Workstation performed: [id number]     Ct Head Without Contrast    Result Date: 1/21/2021  Impression: Subacute left MCA territory infarction, as described above  No evidence of hemorrhagic conversion  Neurology consultation and follow-up is recommended  I personally discussed this study with DR Brenna Bang on 1/21/2021 at 12:59 AM  Workstation performed: [id number]     Ct Spine Cervical Without Contrast    Result Date: 1/21/2021  Impression: No cervical spine fracture or traumatic malalignment  There is mild gentle reversal of cervical lordosis  Mild degenerative changes are present  Workstation performed: [id number]     Ct Chest Abdomen Pelvis W Contrast    Result Date: 1/21/2021  Impression: No evidence of acute intrathoracic, intra-abdominal or intrapelvic parenchymal organ injury  No pneumothorax  There is a moderate to large hiatal hernia  Bilateral gynecomastia    I personally discussed this study with DR Brenna Bang on 1/21/2021 at 12:59 AM  Workstation performed: [id number]     Us Right Upper Quadrant    Result Date: 1/22/2021  Impression: Evidence of hepatic steatosis with sparing adjacent to the portal vein  No gallstones  Normal ducts  Workstation performed: [id number]     Scheduled Meds:  Current Facility-Administered Medications   Medication Dose Route Frequency Provider Last Rate    acetaminophen  650 mg Oral Q4H PRN Ernestene Mimes, CRNP      aluminum-magnesium hydroxide-simethicone  30 mL Oral Q6H PRN Ernestene Mimes, CRNP      amLODIPine  5 mg Oral Daily Lovina Pouch, DO      aspirin  81 mg Oral Daily Ernestene Mimes, CRNP      atorvastatin  40 mg Oral QPM Ernestene Mimes, CRNP      clopidogrel  75 mg Oral Daily Ernestene Mimes, CRNP      enoxaparin  30 mg Subcutaneous Q12H [address], DO      folic acid  1 mg Oral Daily Ernestene Mimes, CRNP      hydrOXYzine HCL  50 mg Oral TID PRN Ernestene Mimes, CRNP      Labetalol HCl  10 mg Intravenous Q6H PRN Lovina Pouch, DO      lisinopril  20 mg Oral Daily Lovina Pouch, DO      metoclopramide  10 mg Intravenous Q6H PRN Ernestene Mimes, CRWIN      mirtazapine  30 mg Oral HS Ernestene Mimes, CRNP      multivitamin-minerals  1 tablet Oral Daily Ernestene Mimes, [place]      nitroglycerin  0 4 mg Sublingual Q5 Min PRN Ernestene Mimes, CRNP      oxyCODONE  5 mg Oral Q6H PRN Ernestene Mimes, CHELSI      senna  2 tablet Oral Daily PRN Ernestene Mimes, CHELSI      sodium chloride  125 mL/hr Intravenous Continuous Delphy Defalcis, DO Stopped (01/25/21 1514)    thiamine  100 mg Oral Daily Ernestene Mimes, CHELSI Taylor, DO [facility]  Hospitalist    ** Please Note: This note has been constructed using a voice recognition system   **

## 2021-01-25 NOTE — PLAN OF CARE
Problem: Neurological Deficit  Goal: Neurological status is stable or improving  Description: Interventions:  - Monitor and assess patient's level of consciousness, motor function, sensory function, and level of assistance needed for ADLs  - Monitor and report changes from baseline  Collaborate with interdisciplinary team to initiate plan and implement interventions as ordered  - Provide and maintain a safe environment  - Consider seizure precautions  - Consider fall precautions  - Consider aspiration precautions  - Consider bleeding precautions  Outcome: Progressing     Problem: Activity Intolerance/Impaired Mobility  Goal: Mobility/activity is maintained at optimum level for patient  Description: Interventions:  - Assess and monitor patient  barriers to mobility and need for assistive/adaptive devices  - Assess patient's emotional response to limitations  - Collaborate with interdisciplinary team and initiate plans and interventions as ordered  - Encourage independent activity per ability   - Maintain proper body alignment  - Perform active/passive rom as tolerated/ordered  - Plan activities to conserve energy   - Turn patient as appropriate  Outcome: Progressing     Problem: Communication Impairment  Goal: Ability to express needs and understand communication  Description: Assess patient's communication skills and ability to understand information  Patient will demonstrate use of effective communication techniques, alternative methods of communication and understanding even if not able to speak  - Encourage communication and provide alternate methods of communication as needed  - Collaborate with case management/ for discharge needs  - Include patient/family/caregiver in decisions related to communication    Outcome: Progressing     Problem: Potential for Aspiration  Goal: Non-ventilated patient's risk of aspiration is minimized  Description: Assess and monitor vital signs, respiratory status, and labs (WBC)  Monitor for signs of aspiration (tachypnea, cough, rales, wheezing, cyanosis, fever)  - Assess and monitor patient's ability to swallow  - Place patient up in chair to eat if possible  - HOB up at 90 degrees to eat if unable to get patient up into chair   - Supervise patient during oral intake  - Instruct patient/ family to take small bites  - Instruct patient/ family to take small single sips when taking liquids  - Follow patient-specific strategies generated by speech pathologist   Outcome: Progressing  Goal: Ventilated patient's risk of aspiration is minimized  Description: Assess and monitor vital signs, respiratory status, airway cuff pressure, and labs (WBC)  Monitor for signs of aspiration (tachypnea, cough, rales, wheezing, cyanosis, fever)  - Elevate head of bed 30 degrees if patient has tube feeding   - Monitor tube feeding  Outcome: Progressing     Problem: Nutrition  Goal: Nutrition/Hydration status is improving  Description: Monitor and assess patient's nutrition/hydration status for malnutrition (ex- brittle hair, bruises, dry skin, pale skin and conjunctiva, muscle wasting, smooth red tongue, and disorientation)  Collaborate with interdisciplinary team and initiate plan and interventions as ordered  Monitor patient's weight and dietary intake as ordered or per policy  Utilize nutrition screening tool and intervene per policy  Determine patient's food preferences and provide high-protein, high-caloric foods as appropriate  - Assist patient with eating   - Allow adequate time for meals   - Encourage patient to take dietary supplement as ordered  - Collaborate with clinical nutritionist   - Include patient/family/caregiver in decisions related to nutrition    Outcome: Progressing     Problem: PAIN - ADULT  Goal: Verbalizes/displays adequate comfort level or baseline comfort level  Description: Interventions:  - Encourage patient to monitor pain and request assistance  - Assess pain using appropriate pain scale  - Administer analgesics based on type and severity of pain and evaluate response  - Implement non-pharmacological measures as appropriate and evaluate response  - Consider cultural and social influences on pain and pain management  - Notify physician/advanced practitioner if interventions unsuccessful or patient reports new pain  Outcome: Progressing     Problem: INFECTION - ADULT  Goal: Absence or prevention of progression during hospitalization  Description: INTERVENTIONS:  - Assess and monitor for signs and symptoms of infection  - Monitor lab/diagnostic results  - Monitor all insertion sites, i e  indwelling lines, tubes, and drains  - Monitor endotracheal if appropriate and nasal secretions for changes in amount and color  - [place] appropriate cooling/warming therapies per order  - Administer medications as ordered  - Instruct and encourage patient and family to use good hand hygiene technique  - Identify and instruct in appropriate isolation precautions for identified infection/condition  Outcome: Progressing     Problem: SAFETY ADULT  Goal: Patient will remain free of falls  Description: INTERVENTIONS:  - Assess patient frequently for physical needs  -  Identify cognitive and physical deficits and behaviors that affect risk of falls    -  [place] fall precautions as indicated by assessment   - Educate patient/family on patient safety including physical limitations  - Instruct patient to call for assistance with activity based on assessment  - Modify environment to reduce risk of injury  - Consider OT/PT consult to assist with strengthening/mobility  Outcome: Progressing  Goal: Maintain or return to baseline ADL function  Description: INTERVENTIONS:  -  Assess patient's ability to carry out ADLs; assess patient's baseline for ADL function and identify physical deficits which impact ability to perform ADLs (bathing, care of mouth/teeth, toileting, grooming, dressing, etc )  - Assess/evaluate cause of self-care deficits   - Assess range of motion  - Assess patient's mobility; develop plan if impaired  - Assess patient's need for assistive devices and provide as appropriate  - Encourage maximum independence but intervene and supervise when necessary  - Involve family in performance of ADLs  - Assess for home care needs following discharge   - Consider OT consult to assist with ADL evaluation and planning for discharge  - Provide patient education as appropriate  Outcome: Progressing  Goal: Maintain or return mobility status to optimal level  Description: INTERVENTIONS:  - Assess patient's baseline mobility status (ambulation, transfers, stairs, etc )    - Identify cognitive and physical deficits and behaviors that affect mobility  - Identify mobility aids required to assist with transfers and/or ambulation (gait belt, sit-to-stand, lift, walker, cane, etc )  - [place] fall precautions as indicated by assessment  - Record patient progress and toleration of activity level on Mobility SBAR; progress patient to next Phase/Stage  - Instruct patient to call for assistance with activity based on assessment  - Consider rehabilitation consult to assist with strengthening/weightbearing, etc   Outcome: Progressing     Problem: DISCHARGE PLANNING  Goal: Discharge to home or other facility with appropriate resources  Description: INTERVENTIONS:  - Identify barriers to discharge w/patient and caregiver  - Arrange for needed discharge resources and transportation as appropriate  - Identify discharge learning needs (meds, wound care, etc )  - Arrange for interpretive services to assist at discharge as needed  - Refer to Case Management Department for coordinating discharge planning if the patient needs post-hospital services based on physician/advanced practitioner order or complex needs related to functional status, cognitive ability, or social support system  Outcome: Progressing     Problem: Knowledge Deficit  Goal: Patient/family/caregiver demonstrates understanding of disease process, treatment plan, medications, and discharge instructions  Description: Complete learning assessment and assess knowledge base  Interventions:  - Provide teaching at level of understanding  - Provide teaching via preferred learning methods  Outcome: Progressing     Problem: Potential for Falls  Goal: Patient will remain free of falls  Description: INTERVENTIONS:  - Assess patient frequently for physical needs  -  Identify cognitive and physical deficits and behaviors that affect risk of falls    -  [place] fall precautions as indicated by assessment   - Educate patient/family on patient safety including physical limitations  - Instruct patient to call for assistance with activity based on assessment  - Modify environment to reduce risk of injury  - Consider OT/PT consult to assist with strengthening/mobility  Outcome: Progressing

## 2021-01-25 NOTE — ASSESSMENT & PLAN NOTE
· Transaminitis secondary to hepatic steatosis alcohol use and rhabdomyolysis  · GI following    Results from last 7 days   Lab Units 01/25/21  0508 01/24/21  0240 01/23/21  0610 01/22/21  1206 01/21/21  0618 01/20/21  2332   AST U/L 56* 108* 161* 266* 743* 1,099*   ALT U/L 695* 1,042* 1,320* 1,834* 2,552* 3,144*   TOTAL BILIRUBIN mg/dL 0 66 0 57 0 56 0 83 0 94 1 01*

## 2021-01-25 NOTE — QUICK NOTE
Cardiology note  Evidence of moderate sized PFO by agitated saline injection on ANA  Report to follow

## 2021-01-25 NOTE — PLAN OF CARE
Problem: Neurological Deficit  Goal: Neurological status is stable or improving  Description: Interventions:  - Monitor and assess patient's level of consciousness, motor function, sensory function, and level of assistance needed for ADLs  - Monitor and report changes from baseline  Collaborate with interdisciplinary team to initiate plan and implement interventions as ordered  - Provide and maintain a safe environment  - Consider seizure precautions  - Consider fall precautions  - Consider aspiration precautions  - Consider bleeding precautions  Outcome: Progressing     Problem: Activity Intolerance/Impaired Mobility  Goal: Mobility/activity is maintained at optimum level for patient  Description: Interventions:  - Assess and monitor patient  barriers to mobility and need for assistive/adaptive devices  - Assess patient's emotional response to limitations  - Collaborate with interdisciplinary team and initiate plans and interventions as ordered  - Encourage independent activity per ability   - Maintain proper body alignment  - Perform active/passive rom as tolerated/ordered  - Plan activities to conserve energy   - Turn patient as appropriate  Outcome: Progressing     Problem: Communication Impairment  Goal: Ability to express needs and understand communication  Description: Assess patient's communication skills and ability to understand information  Patient will demonstrate use of effective communication techniques, alternative methods of communication and understanding even if not able to speak  - Encourage communication and provide alternate methods of communication as needed  - Collaborate with case management/ for discharge needs  - Include patient/family/caregiver in decisions related to communication    Outcome: Progressing     Problem: Potential for Aspiration  Goal: Non-ventilated patient's risk of aspiration is minimized  Description: Assess and monitor vital signs, respiratory status, and labs (WBC)  Monitor for signs of aspiration (tachypnea, cough, rales, wheezing, cyanosis, fever)  - Assess and monitor patient's ability to swallow  - Place patient up in chair to eat if possible  - HOB up at 90 degrees to eat if unable to get patient up into chair   - Supervise patient during oral intake  - Instruct patient/ family to take small bites  - Instruct patient/ family to take small single sips when taking liquids  - Follow patient-specific strategies generated by speech pathologist   Outcome: Progressing  Goal: Ventilated patient's risk of aspiration is minimized  Description: Assess and monitor vital signs, respiratory status, airway cuff pressure, and labs (WBC)  Monitor for signs of aspiration (tachypnea, cough, rales, wheezing, cyanosis, fever)  - Elevate head of bed 30 degrees if patient has tube feeding   - Monitor tube feeding  Outcome: Progressing     Problem: Nutrition  Goal: Nutrition/Hydration status is improving  Description: Monitor and assess patient's nutrition/hydration status for malnutrition (ex- brittle hair, bruises, dry skin, pale skin and conjunctiva, muscle wasting, smooth red tongue, and disorientation)  Collaborate with interdisciplinary team and initiate plan and interventions as ordered  Monitor patient's weight and dietary intake as ordered or per policy  Utilize nutrition screening tool and intervene per policy  Determine patient's food preferences and provide high-protein, high-caloric foods as appropriate  - Assist patient with eating   - Allow adequate time for meals   - Encourage patient to take dietary supplement as ordered  - Collaborate with clinical nutritionist   - Include patient/family/caregiver in decisions related to nutrition    Outcome: Progressing     Problem: PAIN - ADULT  Goal: Verbalizes/displays adequate comfort level or baseline comfort level  Description: Interventions:  - Encourage patient to monitor pain and request assistance  - Assess pain using appropriate pain scale  - Administer analgesics based on type and severity of pain and evaluate response  - Implement non-pharmacological measures as appropriate and evaluate response  - Consider cultural and social influences on pain and pain management  - Notify physician/advanced practitioner if interventions unsuccessful or patient reports new pain  Outcome: Progressing     Problem: INFECTION - ADULT  Goal: Absence or prevention of progression during hospitalization  Description: INTERVENTIONS:  - Assess and monitor for signs and symptoms of infection  - Monitor lab/diagnostic results  - Monitor all insertion sites, i e  indwelling lines, tubes, and drains  - Monitor endotracheal if appropriate and nasal secretions for changes in amount and color  - [place] appropriate cooling/warming therapies per order  - Administer medications as ordered  - Instruct and encourage patient and family to use good hand hygiene technique  - Identify and instruct in appropriate isolation precautions for identified infection/condition  Outcome: Progressing     Problem: SAFETY ADULT  Goal: Patient will remain free of falls  Description: INTERVENTIONS:  - Assess patient frequently for physical needs  -  Identify cognitive and physical deficits and behaviors that affect risk of falls    -  [place] fall precautions as indicated by assessment   - Educate patient/family on patient safety including physical limitations  - Instruct patient to call for assistance with activity based on assessment  - Modify environment to reduce risk of injury  - Consider OT/PT consult to assist with strengthening/mobility  Outcome: Progressing  Goal: Maintain or return to baseline ADL function  Description: INTERVENTIONS:  -  Assess patient's ability to carry out ADLs; assess patient's baseline for ADL function and identify physical deficits which impact ability to perform ADLs (bathing, care of mouth/teeth, toileting, grooming, dressing, etc )  - Assess/evaluate cause of self-care deficits   - Assess range of motion  - Assess patient's mobility; develop plan if impaired  - Assess patient's need for assistive devices and provide as appropriate  - Encourage maximum independence but intervene and supervise when necessary  - Involve family in performance of ADLs  - Assess for home care needs following discharge   - Consider OT consult to assist with ADL evaluation and planning for discharge  - Provide patient education as appropriate  Outcome: Progressing  Goal: Maintain or return mobility status to optimal level  Description: INTERVENTIONS:  - Assess patient's baseline mobility status (ambulation, transfers, stairs, etc )    - Identify cognitive and physical deficits and behaviors that affect mobility  - Identify mobility aids required to assist with transfers and/or ambulation (gait belt, sit-to-stand, lift, walker, cane, etc )  - [place] fall precautions as indicated by assessment  - Record patient progress and toleration of activity level on Mobility SBAR; progress patient to next Phase/Stage  - Instruct patient to call for assistance with activity based on assessment  - Consider rehabilitation consult to assist with strengthening/weightbearing, etc   Outcome: Progressing     Problem: DISCHARGE PLANNING  Goal: Discharge to home or other facility with appropriate resources  Description: INTERVENTIONS:  - Identify barriers to discharge w/patient and caregiver  - Arrange for needed discharge resources and transportation as appropriate  - Identify discharge learning needs (meds, wound care, etc )  - Arrange for interpretive services to assist at discharge as needed  - Refer to Case Management Department for coordinating discharge planning if the patient needs post-hospital services based on physician/advanced practitioner order or complex needs related to functional status, cognitive ability, or social support system  Outcome: Progressing     Problem: Knowledge Deficit  Goal: Patient/family/caregiver demonstrates understanding of disease process, treatment plan, medications, and discharge instructions  Description: Complete learning assessment and assess knowledge base  Interventions:  - Provide teaching at level of understanding  - Provide teaching via preferred learning methods  Outcome: Progressing     Problem: Potential for Falls  Goal: Patient will remain free of falls  Description: INTERVENTIONS:  - Assess patient frequently for physical needs  -  Identify cognitive and physical deficits and behaviors that affect risk of falls  -  [place] fall precautions as indicated by assessment   - Educate patient/family on patient safety including physical limitations  - Instruct patient to call for assistance with activity based on assessment  - Modify environment to reduce risk of injury  - Consider OT/PT consult to assist with strengthening/mobility  Outcome: Progressing     Problem: Knowledge Deficit  Goal: Patient/family/caregiver demonstrates understanding of disease process, treatment plan, medications, and discharge instructions  Description: Complete learning assessment and assess knowledge base    Interventions:  - Provide teaching at level of understanding  - Provide teaching via preferred learning methods  Outcome: Progressing

## 2021-01-26 ENCOUNTER — APPOINTMENT (INPATIENT)
Dept: CT IMAGING | Facility: HOSPITAL | Age: 41
DRG: 064 | End: 2021-01-26

## 2021-01-26 ENCOUNTER — APPOINTMENT (INPATIENT)
Dept: NON INVASIVE DIAGNOSTICS | Facility: HOSPITAL | Age: 41
DRG: 064 | End: 2021-01-26

## 2021-01-26 LAB
ALBUMIN SERPL BCP-MCNC: 3 G/DL (ref 3.5–5)
ALP SERPL-CCNC: 63 U/L (ref 46–116)
ALT SERPL W P-5'-P-CCNC: 442 U/L (ref 12–78)
ANION GAP SERPL CALCULATED.3IONS-SCNC: 7 MMOL/L (ref 4–13)
AST SERPL W P-5'-P-CCNC: 33 U/L (ref 5–45)
BILIRUB SERPL-MCNC: 1 MG/DL (ref 0.2–1)
BUN SERPL-MCNC: 22 MG/DL (ref 5–25)
CALCIUM ALBUM COR SERPL-MCNC: 9.2 MG/DL (ref 8.3–10.1)
CALCIUM SERPL-MCNC: 8.4 MG/DL (ref 8.3–10.1)
CHLORIDE SERPL-SCNC: 104 MMOL/L (ref 100–108)
CO2 SERPL-SCNC: 29 MMOL/L (ref 21–32)
CREAT SERPL-MCNC: 1.28 MG/DL (ref 0.6–1.3)
ERYTHROCYTE [DISTWIDTH] IN BLOOD BY AUTOMATED COUNT: 20.9 % (ref 11.6–15.1)
F2 GENE MUT ANL BLD/T: NORMAL
GFR SERPL CREATININE-BSD FRML MDRD: 69 ML/MIN/1.73SQ M
GLUCOSE SERPL-MCNC: 95 MG/DL (ref 65–140)
HCT VFR BLD AUTO: 37.3 % (ref 36.5–49.3)
HGB BLD-MCNC: 10.6 G/DL (ref 12–17)
INR PPP: 1.16 (ref 0.84–1.19)
MCH RBC QN AUTO: 20.3 PG (ref 26.8–34.3)
MCHC RBC AUTO-ENTMCNC: 28.4 G/DL (ref 31.4–37.4)
MCV RBC AUTO: 71 FL (ref 82–98)
PLATELET # BLD AUTO: 302 THOUSANDS/UL (ref 149–390)
PMV BLD AUTO: 9.6 FL (ref 8.9–12.7)
POTASSIUM SERPL-SCNC: 4 MMOL/L (ref 3.5–5.3)
PROT S ACT/NOR PPP: 128 % (ref 57–157)
PROT S PPP-ACNC: 74 % (ref 60–150)
PROT SERPL-MCNC: 7.2 G/DL (ref 6.4–8.2)
PROTHROMBIN TIME: 14.6 SECONDS (ref 11.6–14.5)
RBC # BLD AUTO: 5.23 MILLION/UL (ref 3.88–5.62)
SODIUM SERPL-SCNC: 140 MMOL/L (ref 136–145)
WBC # BLD AUTO: 16.56 THOUSAND/UL (ref 4.31–10.16)

## 2021-01-26 PROCEDURE — 92507 TX SP LANG VOICE COMM INDIV: CPT

## 2021-01-26 PROCEDURE — G1004 CDSM NDSC: HCPCS

## 2021-01-26 PROCEDURE — 93970 EXTREMITY STUDY: CPT

## 2021-01-26 PROCEDURE — 86235 NUCLEAR ANTIGEN ANTIBODY: CPT | Performed by: PHYSICIAN ASSISTANT

## 2021-01-26 PROCEDURE — 80053 COMPREHEN METABOLIC PANEL: CPT | Performed by: INTERNAL MEDICINE

## 2021-01-26 PROCEDURE — 85610 PROTHROMBIN TIME: CPT | Performed by: INTERNAL MEDICINE

## 2021-01-26 PROCEDURE — 97535 SELF CARE MNGMENT TRAINING: CPT

## 2021-01-26 PROCEDURE — 86430 RHEUMATOID FACTOR TEST QUAL: CPT | Performed by: PHYSICIAN ASSISTANT

## 2021-01-26 PROCEDURE — 85027 COMPLETE CBC AUTOMATED: CPT | Performed by: INTERNAL MEDICINE

## 2021-01-26 PROCEDURE — 99232 SBSQ HOSP IP/OBS MODERATE 35: CPT | Performed by: PHYSICAL MEDICINE & REHABILITATION

## 2021-01-26 PROCEDURE — 86255 FLUORESCENT ANTIBODY SCREEN: CPT | Performed by: PHYSICIAN ASSISTANT

## 2021-01-26 PROCEDURE — 93320 DOPPLER ECHO COMPLETE: CPT | Performed by: INTERNAL MEDICINE

## 2021-01-26 PROCEDURE — 93312 ECHO TRANSESOPHAGEAL: CPT | Performed by: INTERNAL MEDICINE

## 2021-01-26 PROCEDURE — 86038 ANTINUCLEAR ANTIBODIES: CPT | Performed by: PHYSICIAN ASSISTANT

## 2021-01-26 PROCEDURE — 97530 THERAPEUTIC ACTIVITIES: CPT

## 2021-01-26 PROCEDURE — 97110 THERAPEUTIC EXERCISES: CPT

## 2021-01-26 PROCEDURE — 70450 CT HEAD/BRAIN W/O DYE: CPT

## 2021-01-26 PROCEDURE — 93325 DOPPLER ECHO COLOR FLOW MAPG: CPT | Performed by: INTERNAL MEDICINE

## 2021-01-26 PROCEDURE — 99232 SBSQ HOSP IP/OBS MODERATE 35: CPT | Performed by: INTERNAL MEDICINE

## 2021-01-26 PROCEDURE — 97116 GAIT TRAINING THERAPY: CPT

## 2021-01-26 PROCEDURE — 99232 SBSQ HOSP IP/OBS MODERATE 35: CPT | Performed by: PSYCHIATRY & NEUROLOGY

## 2021-01-26 PROCEDURE — 93970 EXTREMITY STUDY: CPT | Performed by: SURGERY

## 2021-01-26 PROCEDURE — 86225 DNA ANTIBODY NATIVE: CPT | Performed by: PHYSICIAN ASSISTANT

## 2021-01-26 RX ORDER — WARFARIN SODIUM 7.5 MG/1
7.5 TABLET ORAL
Status: DISCONTINUED | OUTPATIENT
Start: 2021-01-26 | End: 2021-01-28

## 2021-01-26 RX ADMIN — ENOXAPARIN SODIUM 150 MG: 150 INJECTION SUBCUTANEOUS at 22:20

## 2021-01-26 RX ADMIN — CLOPIDOGREL BISULFATE 75 MG: 75 TABLET ORAL at 08:31

## 2021-01-26 RX ADMIN — BENZONATATE 200 MG: 100 CAPSULE ORAL at 22:20

## 2021-01-26 RX ADMIN — MIRTAZAPINE 30 MG: 15 TABLET, FILM COATED ORAL at 22:19

## 2021-01-26 RX ADMIN — BENZONATATE 200 MG: 100 CAPSULE ORAL at 17:26

## 2021-01-26 RX ADMIN — AMLODIPINE BESYLATE 5 MG: 5 TABLET ORAL at 08:31

## 2021-01-26 RX ADMIN — BENZONATATE 200 MG: 100 CAPSULE ORAL at 08:31

## 2021-01-26 RX ADMIN — ATORVASTATIN CALCIUM 40 MG: 40 TABLET, FILM COATED ORAL at 17:26

## 2021-01-26 RX ADMIN — WARFARIN SODIUM 7.5 MG: 7.5 TABLET ORAL at 22:20

## 2021-01-26 RX ADMIN — ASPIRIN 81 MG: 81 TABLET, COATED ORAL at 08:31

## 2021-01-26 RX ADMIN — FOLIC ACID 1 MG: 1 TABLET ORAL at 08:31

## 2021-01-26 RX ADMIN — LISINOPRIL 20 MG: 20 TABLET ORAL at 08:31

## 2021-01-26 RX ADMIN — THIAMINE HCL TAB 100 MG 100 MG: 100 TAB at 08:31

## 2021-01-26 RX ADMIN — Medication 1 TABLET: at 08:31

## 2021-01-26 RX ADMIN — ENOXAPARIN SODIUM 30 MG: 30 INJECTION SUBCUTANEOUS at 08:32

## 2021-01-26 NOTE — PLAN OF CARE
Problem: PHYSICAL THERAPY ADULT  Goal: Performs mobility at highest level of function for planned discharge setting  See evaluation for individualized goals  Description: Treatment/Interventions: Functional transfer training, LE strengthening/ROM, Elevations, Therapeutic exercise, Endurance training, Cognitive reorientation, Patient/family training, Equipment eval/education, Bed mobility, Gait training, Continued evaluation, Spoke to nursing, OT          See flowsheet documentation for full assessment, interventions and recommendations  Outcome: Progressing  Note: Prognosis: Good  Problem List: Decreased strength, Decreased range of motion, Decreased endurance, Impaired balance, Decreased mobility, Decreased coordination, Decreased cognition, Decreased safety awareness, Impaired judgement, Obesity, Decreased skin integrity, Orthopedic restrictions, Pain, Impaired sensation  Assessment: Pt agreeable to participate in BLE therex, bed mobility training, transfer training, and gait /elevation training; during today's PT tx sessions  Improvement assessed with: balance, activity tolerance,  bilateral rolling, sit <->supine transfers, sit <->stand transfers, SPT's, and gait  Initiated elevation training on stairs in stairwell today  A second therapist was present for safety purposes  Pt appears safer and steadier with use of RW as opposed to no AD; at this time  Pt alternated between non-reciprocle and reciprocle patterning with ascension and descension  Had difficulty following sequencing cues  Barriers to Discharge: Decreased caregiver support, Inaccessible home environment(Home alone; no social support)  Barriers to Discharge Comments: Below functional baseline  PT Discharge Recommendation: (Home vs STR (pending safety and elevations))     PT - OK to Discharge: Yes(Pending d/c disposition)    See flowsheet documentation for full assessment

## 2021-01-26 NOTE — OCCUPATIONAL THERAPY NOTE
Occupational Therapy Treatment Note    Name:  Ruchi Gomez   MRN:   [id number]  Age:     41 y o  Patient Active Problem List   Diagnosis    Anxiety    MDD (major depressive disorder), recurrent episode, moderate (HCC)    Obsessive-compulsive disorder with good or fair insight    Generalized anxiety disorder    Social anxiety disorder    Elevated troponin    [facility]cerebro[facility]) ([facility] [address] [facility]    Alcohol abuse    Drug abuse ([facility] )    Transaminitis    Microcytic anemia    Elevated serum creatinine    Hypertensive urgency    Rhabdomyolysis    Super obese     Cough [R05]  Rhabdomyolysis [M62 82]  Altered mental status [R41 82]  Elevated troponin [R77 8]  Elevated LFTs [R79 89]  NSTEMI (non-ST elevated myocardial infarction) ([facility]) [I21 4]  Acute ischemic left MCA stroke ([facility]) [I63 512]  REANNA (acute kidney injury) ([facility] ) [N17 9]  Acute CVA (cerebrovascular accident) ([place] [address] ) [I63 9]  Acute liver failure without hepatic coma [K72 00]      Subjective/Goals: "this hand does not want to work" (patient flopping hand/wrist)-- used phone as ROACH arrived without issue     Vitals: stable throughout session-- post steps 161/69, 85 HR    Pain: 4/10 right LE/foot    Treatment Time: (1812-4870) 53 minutes    Cognition: impaired    Patient education: DEEP BREATHING TECHNIQUES T/O ACTIVITY, SLOWING OF PACE, ENERGY CONSERVATION TECHNIQUES FOR CARRY OVER UPON D/C, INCREASED FAMILY SUPPORT, CONTINUE PARTICIPATION IN SELF-CARE/MOBILITY WITH STAFF [address] , OVERALL SAFETY AWARENESS, FALL PREVENTION, ENERGY CONSERVATION  and PAIN MANAGEMENT WITH FUNCTIONAL ACTIVITIES    Additional comments:    Assessment/Additional session details:  Patient seen this date for OT with focus on goals as set by OTR    Patient agreeable to skilled OT session with focus on ADL's (bathing, dressing, toileting), Transfers (STS, SPT), Standing tolerance/balance, Strength/ROM/HEP, Cognition, Activity tolerance, Education on safety, fall prevention and energy conservation techniques, Bathroom/kitchen/home mobility and Fine motor coordination/hand strength  Barriers to treatment include fatigue, pain, cognition, safety, shortess of breath, decreased strength/coordination, balance , activity tolerance and standing tolerance  Patient educated on safe functional transfers techniques, the appropriate use of AE/DME to improve functional performance, and activity modification techniques for energy conservation  Plans for d/c are home with home OT and family support (pending progress)  Patient is making gains toward OT goals with continued OT recommended at this time to max tolerance, safety and function for appropriate d/c planning  At end of session patient remains in room seated in arm chair with all needs within reach  01/26/21 1133   OT Last Visit   OT Visit Date 01/26/21   Note Type   Note Type Treatment   Restrictions/Precautions   Weight Bearing Precautions Per Order No   Other Precautions Impulsive; Chair Alarm; Bed Alarm;Pain; Fall Risk;Cognitive   Pain Assessment   Pain Assessment Tool 0-10   Pain Score 4   Pain Location/Orientation Orientation: Right;Location: Leg   Pain Onset/Description Onset: Ongoing; Descriptor: Michael Guerraa  (stiff)   ADL   Where Assessed   (sitting on toilet and then edge of bed)   Eating Comments independent   Grooming Comments independent    UB Dressing Comments patient assisted to change gown at min assist 2* heart monitor  Patient reports that he has difficulty controlling right UE and with decreased coordination however when observed no signifient issues noted   LB Dressing Comments patient asked to put on a pair of pants and shoes to go to the stairwell and walk  Upon set up patient asked to dress in bathroom therefore distantly observed patient dressed with MI  No issues noted    Shoes MI on right and mod assist on left due to tightness of shoes along with wearing slipper socks during trial   Patient reports having other shoes at home which would be recommended to ease management liana given back of shoes worn and can increase fall risk   Toileting Comments MI CM and hygiene    Kitchen Mobility   Kitchen Mobility Comments simulated in room with walker supervision  can be impulsive and not always use walker   Functional Standing Tolerance   Time 5-6 minutes with mobility, steps and activity    Comments appears steady however is impulsive posing as a potential fall risk    Bed Mobility   Supine to Sit 6  Modified independent   Sit to Supine 6  Modified independent   Additional Comments no issues noted, flat bed, no rail    Transfers   Sit to Stand 6  Modified independent   Stand to Sit 5  Supervision   Additional items Verbal cues   Stand pivot 5  Supervision   Additional items Verbal cues   Additional Comments cues for safety for hand placement and control decent  Both mobility short distance or SPT with and without RW   Functional Mobility   Functional Mobility 5  Supervision   Additional Comments with and without RW short distance and longer distance with walker   Therapeutic Exercise - ROM   UE-ROM   (patient reports decreased right ROM/FMC)   Coordination   Fine Motor   (encouraged hand squeezes and [facility] challenges )   Cognition   Overall Cognitive Status Impaired   Arousal/Participation Alert; Cooperative   Attention Attends with cues to redirect   Orientation Level Oriented X4   Memory Decreased short term memory   Following Commands Follows one step commands with increased time or repetition   Comments patient reports challenges with memory in terms of recall of passwords, some difficulty reported with managment of right UE however was fairly functional with tasks provided,  Patient also reports some challenges with word finding and problem solving-   Additional Activities   Additional Activities Comments Good+ sitting balance; Good good dynamic balance    Activity Tolerance   Activity Tolerance Patient tolerated treatment well   Assessment   Assessment see note   Plan   Treatment Interventions Activityengagement; Energy conservation; ADL retraining;Functional transfer training;UE strengthening/ROM   Goal Expiration Date 02/04/21   OT Treatment Day 1   OT Frequency 3-5x/wk   Recommendation   OT Discharge Recommendation   (TBD pending progress )   Manuel Stacy  1/26/2021

## 2021-01-26 NOTE — PHYSICAL THERAPY NOTE
Physical Therapy Daily Treatment Note       01/26/21  Total Treatment Time: 55 minutes   40 minutes (10:52 to 11:32)  15 minutes (12:07 to 12:22)-> seen for BLE shannonx      PT Last Visit   PT Visit Date 01/26/21   Note Type   Note Type Treatment   Pain Assessment   Pain Assessment Tool 0-10   Pain Score 4   Pain Location/Orientation Orientation: Right;Location: Leg;Location: Foot   Pain Onset/Description Descriptor: Sharp  ("Stiff")   Restrictions/Precautions   Weight Bearing Precautions Per Order No   Other Precautions Chair Alarm; Bed Alarm; Impulsive;Cognitive; Fall Risk;Pain   General   Chart Reviewed Yes   Response to Previous Treatment Patient with no complaints from previous session  Family/Caregiver Present No   Cognition   Overall Cognitive Status Impaired   Arousal/Participation Cooperative   Attention Attends with cues to redirect   Memory Decreased recall of precautions;Decreased short term memory   Following Commands Follows one step commands with increased time or repetition   Comments Pt assessed to have difficulty at times with processing then responding to questions and word-finding  Pt also stated that he lost coordination in his right hand and has difficulty using it at times  However , this PT observed pt use his right hand fairly well with functional activities, especially when engaged in conversation and distracted         Subjective   Subjective Pt perseverating on having "another stroke in the future"   Bed Mobility   Rolling R 6  Modified independent   Additional items Increased time required   Rolling L 6  Modified independent   Additional items Increased time required   Supine to Sit 6  Modified independent   Additional items Increased time required   Sit to Supine 6  Modified independent   Additional items Increased time required   Additional Comments HOB flat, with then w/o rails   Transfers   Sit to Stand 6  Modified independent   Additional items Increased time required  (Occ cues for hand placement, but still appears safe/steady)   Stand to Sit 5  Supervision  (Occ cues for hand placement and to control descent)   Additional items Increased time required   Stand pivot 5  Supervision  (SPT with and without RW)   Additional items Increased time required  (Cues for turn completion w/ RW)   Additional Comments Transfers:EOB, recliner, toilet   Ambulation/Elevation   Gait pattern   (Decreased but adequate foot clearance wearing sneakers)   Gait Assistance 5  Supervision  (With RW and without AD)   Assistive Device Rolling walker   Distance With RW: 10 feet, 45 feet, 50 feet, 100 feet  (Without AD: 12 feet )   Stair Management Assistance 4  Minimal assist   Additional items Increased time required; Tactile cues; Verbal cues  (Cues for sequencing-> recommend step to)   Stair Management Technique Foreward;Reciprocal;Nonreciprocal;Sideways   Number of Stairs 8  (Bilateral hands on RHR up)   Balance   Static Sitting   (Good+)   Dynamic Sitting   (G (-): assesed donning socks and sneakers)   Static Standing   (Good (-) with RW)   Dynamic Standing Fair +  (With RW)   Ambulatory Fair +  (With RW)   Higher level balance Side stepping  (Fair + with RW)   Endurance Deficit   Endurance Deficit Yes  (Provided w/ monitored rest breaks prn between activities)   Activity Tolerance   Activity Tolerance Patient limited by fatigue;Patient tolerated treatment well  (+ SOB post elevation training and during BLE therex)   Exercises   Hamstring Sets Left;Right;Sitting  (With minimum manual resistanc)   Hip Flexion Left;Right;Sitting  (15 x 2 (sit)->w/ min manual resist; SLR stand 15 x 2 (AROM))   Hip Abduction Sitting;15 reps;Standing;Left;Right;AROM  (With minimum manual resistanc)   Hip Adduction 15 reps;Bilateral;Sitting  (Against pillow)   Knee AROM Long Arc Quad Left;Right;Sitting  (15 x 2->With minimum manual resistanc)   Ankle Pumps Bilateral;AROM;Standing;15 reps   Marching 15 reps;Standing;Left;Right;AROM Assessment   Prognosis Good   Problem List Decreased strength;Decreased range of motion;Decreased endurance; Impaired balance;Decreased mobility; Decreased coordination;Decreased cognition;Decreased safety awareness; Impaired judgement;Obesity; Decreased skin integrity;Orthopedic restrictions;Pain; Impaired sensation   Assessment Pt agreeable to participate in BLE therex, bed mobility training, transfer training, and gait /elevation training; during today's PT tx sessions  Improvement assessed with: balance, activity tolerance,  bilateral rolling, sit <->supine transfers, sit <->stand transfers, SPT's, and gait  Initiated elevation training on stairs in stairwell today  A second therapist was present for safety purposes  Pt appears safer and steadier with use of RW as opposed to no AD; at this time  Pt alternated between non-reciprocle and reciprocle patterning with ascension and descension  Had difficulty following sequencing cues  Barriers to Discharge Decreased caregiver support; Inaccessible home environment  (Home alone; no social support)   Barriers to Discharge Comments Below functional baseline   Goals   Patient Goals " I want to get strong again"   STG Expiration Date 02/04/21    Pt will function at Mod I for bed mobility  2  Pt will function at Mod I for transfers and ambulation  3  Pt will ambulate >150'x1 w/ least restrictive AD for community/household distances  4  Pt will increase activity tolerance to 45 minutes  5  Pt will negotiate stairs at Mod I for safe d/c home  6  Increase Barthel  by MCID value OF 35 to facilitate independence   7  PT for ongoing patient and family/caregiver education, DME needs and d/c planning in order to promote highest level of function in least restrictive environment  PT Treatment Day 2   Plan   Treatment/Interventions ADL retraining;Elevations;LE strengthening/ROM; Functional transfer training; Therapeutic exercise; Endurance training;Cognitive reorientation; Bed mobility; Equipment eval/education;Patient/family training;Gait training; Compensatory technique education;Continued evaluation;Spoke to MD;Spoke to nursing;Spoke to case management;Spoke to advanced practitioner;OT;Family;ST   Progress Progressing toward goals   PT Frequency   (3 to 5x/week)   Recommendation   PT Discharge Recommendation   (Home vs STR (pending safety and elevations))   Equipment Recommended   (TBD)   PT - OK to Discharge Yes  (Pending d/c disposition)       Vitals  After elevation training on 8 steps with 1 HR + 50 feet amb distance with RW (seated): 161/69, HR 85

## 2021-01-26 NOTE — SPEECH THERAPY NOTE
Speech Language/Pathology    Speech/Language Pathology Progress Note    Patient Name: Patrick Smiht  Today's Date: 1/26/2021     Problem List  Principal Problem:    Acute CVA (cerebrovascular accident) [facility])  Active Problems:    MDD (major depressive disorder), recurrent episode, moderate (HCC)    Elevated troponin    Alcohol abuse    Drug abuse ([facility] [address] )    Transaminitis    Microcytic anemia    Elevated serum creatinine    Hypertensive urgency    Rhabdomyolysis    Super obese       Past Medical History  Past Medical History:   Diagnosis Date    High blood pressure         Past Surgical History  Past Surgical History:   Procedure Laterality Date    HERNIA REPAIR           Subjective:  Pt alert and sitting upright in chair  "I do not feel like I'm cognitively there"  Objective:  Pt seen for ongoing speech and language tx  Pt assessed w/ reading and auditory comprehension tasks  Pt was able to answer reading comprehension questions and locate answers  (related to copy of TV Guide) accurately and independently in 7/10 opportunities  He required verbal cues for 3/10  Pt was able to independently follow R/L auditory comprehension/discrimination tasks in 8/8 opportunities  C/o blurred vision, and difficulty remembering numbers (phone code, bank #,etc )  Assessment:  Pt demonstrated adequate auditory comprehension and reading comprehension skills w/ minimal cueing  Speech was clear and fluent  Pt feels he is not at baseline     Plan/Recommendations:  SLP to follow for ongoing recs

## 2021-01-26 NOTE — ASSESSMENT & PLAN NOTE
· Acute left MCA CVA unable to fit into MRI  · Seen by neurology  Transesophageal echocardiogram with bubble study demonstrates PFO  · Continue on aspirin and clopidogrel  Lower extremity duplex positive for DVT  · Case discussed with neurology and cardiology    Will discontinue aspirin/clopidogrel and start bridging enoxaparin to warfarin

## 2021-01-26 NOTE — PROGRESS NOTES
PM&R Consult Follow Up Note  Worthy Orn 41 y o  male MRN: [id number]  Unit/Bed#: E4 -01 Encounter: [id number]     Assessment:  Rehabilitation Diagnosis:   · Left MCA Stroke  · Impaired mobility and self care  · Impaired cognition      Recommendations:  Rehabilitation Plan:  · Continue PT/OT (SLP) while on acute care  Will need repeat PT/OT within 48 hours of discharge  · States he does not have a car and was not able to drive  · Does not have support available, would need to be modified Ind in order to safely discharge home  · Has flight of stairs to get to apartment  · Lives in 2nd floor apartment  · States that he was living off a "trust fund" or "lump sum" but that money has been running out  · Patient is MA pending, and would have to be approved from finance standpoint  · If he is at a modified Ind level, safe to discharge home with services  If not, would likely benefit from short term rehab - level to be determined based on PT/OT eval    · Please repeat PT/OT eval - patient has been going to the bathroom independently  · I will follow-up this week  · Covid-19 Testing: [facility] rehabilitation units require testing within 48 hours of potential admission for all general admissions  Please re-test if needed  *Re-testing is NOT required for patients recovering from COVID-19 infection if isolation has been discontinued per CDC criteria            Medical Co-morbidities Plan:  Left MCA Stroke  Obesity BMI 48 18  Moderate PFO - clarify plan of treatment vs loop recorder placement with Cards prior to discharge to post-acute setting  Rhabdomyolysis - resolved with IV fluids  Hypertension with hypertensive urgency - improving on amlodipine/lisinopril  REANNA - Crea increased today  Microcytic Anemia  Alcohol use - Not interested in Psych admission, plans on quitting  Opioid abuse  Anabolic steroid use- recreational  NSTEMI - Elevated troponin trending down  Likely non-ACS per cards     Major depressive disorder  Anemia  Bowel plan: Last BM 1/26 per patient  Bladder plan: continent   DVT ppx: lovenox       Thank you for this consultation  Do not hesitate to contact service with further questions  Last Seen: 01/22    Interval History/Subjective:   ANA performed 1/25 showed moderate sized PFO  Dopplers performed to r/o DVT/paradoxical embolism which are pending  Neuro recommended Cards consult for loop vs  PFO closure   - Heparin d/c elevated troponin felt 2/2 to rhabdo/non-ACS  MRI Brain unable to be performed due toe habitus  CT 3 days post symptom onset - no need for repeat CTh at this time  GI saw patient and cleared for anticoagulation/monitoring liver enzymes after shock liver (currently resolving)    States he has been walking to and from the bathroom independently  Last BM was 1/26  He still feels that his R hand is somewhat clumsy and he has difficulty with sensation and motor planning  He denies any headaches, CP, SOB, fevers, chills, N/V, abdominal pain  No new weakness/sensory impairment  Pending labs: Thrombosis panel, anti-ds DNA, RF, Sjogren's antibodies, ANCA, ARMOND    ROS: A 10 point ROS was performed; negative except as noted above        Physical Therapy:  No new updates     Occupational Therapy: No new updates       Vital Signs:      Temp:  [98 °F (36 7 °C)-100 3 °F (37 9 °C)] 98 °F (36 7 °C)  HR:  [70-98] 75  Resp:  [12-24] 20  BP: ()/() 136/71   Intake/Output Summary (Last 24 hours) at 1/26/2021 0902  Last data filed at 1/25/2021 1452  Gross per 24 hour   Intake 900 ml   Output    Net 900 ml        Laboratory:      Lab Results   Component Value Date    HGB 10 6 (L) 01/26/2021    HCT 37 3 01/26/2021    WBC 16 56 (H) 01/26/2021     Lab Results   Component Value Date    BUN 22 01/26/2021    K 4 0 01/26/2021     01/26/2021    CREATININE 1 28 01/26/2021     Lab Results   Component Value Date    PROTIME 15 5 (H) 01/22/2021    INR 1 25 (H) 01/22/2021 Gen: No acute distress, Well-nourished, well-appearing  HEENT: Moist mucus membranes, Normocephalic/Atraumatic  Cardiovascular: Regular rate, rhythm, S1/S2  Distal pulses palpable  Heme/Extr: No edema  Pulmonary: Non-labored breathing  Lungs CTAB  : No chung  GI: Soft, non-tender, non-distended  BS+  MSK: ROM is WFL in all extremities  No effusions or deformities  Bulk is symmetric  See below for MMT scores  Integumentary: Skin is warm, dry  Neuro: AAOx3, CN 2-12 intact  Impaired sensation RUE/RLE compared to L  Speech is intact  Appropriate to questioning  Tone is normal     Coord: No nancy ataxia/dysmetria, motor planning difficulties noted when testing strength  No R arm fix, no dysdiadochokinesia  MMT:   Strength:   Right  Left  Site  Right  Left  Site    5- 5  S Ab: Shoulder Abductors  5-  5  HF: Hip Flexors    5- 5  EF: Elbow Flexors  5  5 KF: Knee Flexors    5-  5  EE: Elbow Extensors  4+  4+  KE: Knee Extensors    5  5  WE: Wrist Extensors  5  5  DR: Dorsi Flexors    5-  5  FF: Finger Flexors  5  5  PF: Plantar Flexors    5-  5  HI: Hand Intrinsics  5  5  EHL: Extensor Hallucis Longus   Psych: Normal mood and affect  Imaging Reviewed:  1/22 US RUQ:  IMPRESSION:     Evidence of hepatic steatosis with sparing adjacent to the portal vein      No gallstones  Normal ducts  1/21 Echo:     LEFT VENTRICLE:  Systolic function was normal  Ejection fraction was estimated to be 60 %  There were no regional wall motion abnormalities  Features were consistent with a pseudonormal left ventricular filling pattern, with concomitant abnormal relaxation and increased filling pressure (grade 2 diastolic dysfunction)      LEFT ATRIUM:  The atrium was mildly dilated      RIGHT ATRIUM:  The atrium was mildly dilated      IVC, HEPATIC VEINS:  The inferior vena cava was dilated    Respirophasic changes were blunted (less than 50% variation)      PULMONARY ARTERIES:  Systolic pressure was moderately increased  Estimated peak pressure was in the range of 50 mmHg to 55 mmHg  1/25 ANA:  SUMMARY:  1  This is a technically adequate study  2  Left ventricle is normal in size and function  Left ventricular ejection fraction is estimated at 55%  3  Biatrial dilatation is noted  Prominent Chiari network noted within the right atrium  4  Left atrial appendage appears grossly normal without evidence of thrombus or spontaneous echo contrast  5  Moderate PFO is noted by agitated saline injection with right-to-left shunting  Interatrial septum appears aneurysmal   6  Trace mitral regurgitation with fibrocalcific changes of the mitral valve  7  Trace to mild tricuspid regurgitation  8   Aortic root is mildly dilated at 3 6 cm

## 2021-01-26 NOTE — PROGRESS NOTES
Cardiology Progress Note   MD Shaye Justin MD Mikal Estelle, DO, MD Jose Alfredo Moreno DO, Loi Rondon DO, [facility]  ----------------------------------------------------------------  [address] [address]    Iraida Fallon 41 y o  male MRN: [id number]  Unit/Bed#: E4 -01 Encounter: [id number]      ASSESSMENT:    Left MCA infarct   Moderate sized PFO noted on ANA, January 2021   Rhabdomyolysis   Non MI troponin elevation secondary to rhabdomyolysis with peak at 10   LVEF 52%, grade 2 diastolic dysfunction with elevated LAP, mild biatrial dilatation, trace TR w/ PASP 50-55 mmHg, January 2021   Acute kidney injury   Hypertension   Morbid obesity   Alcohol/ narcotic abuse    PLAN:  Patient has moderate sized PFO by agitated saline injection showing between 6 and 25 bubbles passing over with aneurysmal interatrial septum  ANA was well tolerated  DVT study was abnormal  Anticoagulation as per primary team  Patient is reasonable for outpatient implantable loop recorder  I have counseled the patient extensively on alcohol cessation as this could lead to significant arrhythmia including atrial fibrillation  Would benefit for outpatient sleep study evaluation  Continue Plavix, statin lisinopril  Blood pressure management as per Neurology  As patient has DVT on study, will pursue anticoagulation for now rather than an closure  Outpatient follow-up for additional CV testing as clinically indicated after discharge including monitoring  We will see further inpatient p r n  Signed: Brett Olsen DO, [facility]      History of Present Illness:  Patient seen and examined  Denies chest pain, pressure, tightness or squeezing  Denies lightheadedness, dizziness or palpitations  Denies lower extremity swelling, orthopnea or paroxysmal nocturnal dyspnea        Review of Systems:  Review of Systems   Constitution: Negative for decreased appetite, fever, weight gain and weight loss  HENT: Negative for congestion and sore throat  Eyes: Negative for visual disturbance  Cardiovascular: Negative for chest pain, dyspnea on exertion, leg swelling, near-syncope and palpitations  Respiratory: Negative for cough and shortness of breath  Hematologic/Lymphatic: Negative for bleeding problem  Skin: Negative for rash  Musculoskeletal: Negative for myalgias and neck pain  Gastrointestinal: Negative for abdominal pain and nausea  Neurological: Negative for light-headedness and weakness  Psychiatric/Behavioral: Negative for depression  No Known Allergies    No current facility-administered medications on file prior to encounter        Current Outpatient Medications on File Prior to Encounter   Medication Sig    hydrOXYzine HCL (ATARAX) 50 mg tablet Take 1 tablet (50 mg total) by mouth 3 (three) times a day as needed for anxiety    mirtazapine (REMERON) 30 mg tablet TAKE 1 TABLET (30 MG TOTAL) BY MOUTH DAILY AT BEDTIME    Prasterone, DHEA, 50 MG TABS Take by mouth        Current Facility-Administered Medications   Medication Dose Route Frequency Provider Last Rate    acetaminophen  650 mg Oral Q4H PRN Acey Harps, CRNP      aluminum-magnesium hydroxide-simethicone  30 mL Oral Q6H PRN Acey Harps, CRNP      amLODIPine  5 mg Oral Daily Micah Dhaliwal, DO      aspirin  81 mg Oral Daily Acey Harps, CRNP      atorvastatin  40 mg Oral QPM Acey Harps, CRNP      benzonatate  200 mg Oral TID Scooter Ronquillo, DO      clopidogrel  75 mg Oral Daily Acey Harps, CRNP      enoxaparin  30 mg Subcutaneous Q12H [address], DO      folic acid  1 mg Oral Daily Acey Harps, CRNP      hydrOXYzine HCL  50 mg Oral TID PRN Acey Harps, CRNP      Labetalol HCl  10 mg Intravenous Q6H PRN Micah Dhaliwal, DO      lisinopril  20 mg Oral Daily Micah Dhaliwal, DO      metoclopramide  10 mg Intravenous Q6H PRN Tara Mitts, CRNP      mirtazapine  30 mg Oral HS Tara Mitts, CRNP      multivitamin-minerals  1 tablet Oral Daily Tara Vargas, [address]      nitroglycerin  0 4 mg Sublingual Q5 Min PRN Tara Mitts, [address]      oxyCODONE  5 mg Oral Q6H PRN Tara Mitts, CRNP      senna  2 tablet Oral Daily PRN Tara Mitts, CRNP      thiamine  100 mg Oral Daily Tara Mitts, [address]              Vitals:    01/25/21 2001 01/26/21 0026 01/26/21 0300 01/26/21 0700   BP: (!) 172/74 142/67 137/79 136/71   BP Location: Right arm Right arm Right arm Right arm   Pulse: 93 82 76 75   Resp: 18 20 20 20   Temp: 98 5 °F (36 9 °C) 100 3 °F (37 9 °C) 98 3 °F (36 8 °C) 98 °F (36 7 °C)   TempSrc: Temporal Temporal Temporal Tympanic   SpO2: 93% 93% 93% 94%   Weight:       Height:             Intake/Output Summary (Last 24 hours) at 1/26/2021 0857  Last data filed at 1/25/2021 1452  Gross per 24 hour   Intake 900 ml   Output    Net 900 ml       Weight change:     PHYSICAL EXAMINATION:  Gen: Awake, Alert, NAD  HEENT: AT/NC, Anicteric, mmm  Neck: Supple, No elevated JVP  Resp: CTA bilaterally no w/r/r  CV: RRR +S1, S2, No m/r/g  Abd: Soft, NT/ND + BS  Ext: warm, no LE edema bilaterally  Neuro:  Follows commands, moves all extermities  Psych: Appropriate affect    Lab Results:  Results from last 7 days   Lab Units 01/26/21  0516   WBC Thousand/uL 16 56*   HEMOGLOBIN g/dL 10 6*   HEMATOCRIT % 37 3   PLATELETS Thousands/uL 302     Results from last 7 days   Lab Units 01/26/21  0516   POTASSIUM mmol/L 4 0   CHLORIDE mmol/L 104   CO2 mmol/L 29   BUN mg/dL 22   CREATININE mg/dL 1 28   CALCIUM mg/dL 8 4   ALK PHOS U/L 63   ALT U/L 442*   AST U/L 33     No results found for: TROPONINT      Results from last 7 days   Lab Units 01/23/21  0610  01/21/21  0618 01/21/21  0238 01/20/21  2332   CK TOTAL U/L 1,053*   < >  --   --  17,316*   TROPONIN I ng/mL  --   --  7 76* 10 00* 9 33*   CK MB INDEX % <1 0   < >  --   --  <1 0    < > = values in this interval not displayed  Results from last 7 days   Lab Units 01/22/21  1451   INR  1 25*       Tele:     This note was completed in part utilizing M-Modal Fluency Direct Software  Grammatical errors, random word insertions, spelling mistakes, and incomplete sentences may be an occasional consequence of this system secondary to software limitations, ambient noise, and hardware issues  If you have any questions or concerns about the content, text, or information contained within the body of this dictation, please contact the provider for clarification

## 2021-01-26 NOTE — PLAN OF CARE
Problem: Neurological Deficit  Goal: Neurological status is stable or improving  Description: Interventions:  - Monitor and assess patient's level of consciousness, motor function, sensory function, and level of assistance needed for ADLs  - Monitor and report changes from baseline  Collaborate with interdisciplinary team to initiate plan and implement interventions as ordered  - Provide and maintain a safe environment  - Consider seizure precautions  - Consider fall precautions  - Consider aspiration precautions  - Consider bleeding precautions  Outcome: Progressing     Problem: Activity Intolerance/Impaired Mobility  Goal: Mobility/activity is maintained at optimum level for patient  Description: Interventions:  - Assess and monitor patient  barriers to mobility and need for assistive/adaptive devices  - Assess patient's emotional response to limitations  - Collaborate with interdisciplinary team and initiate plans and interventions as ordered  - Encourage independent activity per ability   - Maintain proper body alignment  - Perform active/passive rom as tolerated/ordered  - Plan activities to conserve energy   - Turn patient as appropriate  Outcome: Progressing     Problem: Communication Impairment  Goal: Ability to express needs and understand communication  Description: Assess patient's communication skills and ability to understand information  Patient will demonstrate use of effective communication techniques, alternative methods of communication and understanding even if not able to speak  - Encourage communication and provide alternate methods of communication as needed  - Collaborate with case management/ for discharge needs  - Include patient/family/caregiver in decisions related to communication    Outcome: Progressing     Problem: Potential for Aspiration  Goal: Non-ventilated patient's risk of aspiration is minimized  Description: Assess and monitor vital signs, respiratory status, and labs (WBC)  Monitor for signs of aspiration (tachypnea, cough, rales, wheezing, cyanosis, fever)  - Assess and monitor patient's ability to swallow  - Place patient up in chair to eat if possible  - HOB up at 90 degrees to eat if unable to get patient up into chair   - Supervise patient during oral intake  - Instruct patient/ family to take small bites  - Instruct patient/ family to take small single sips when taking liquids  - Follow patient-specific strategies generated by speech pathologist   Outcome: Progressing  Goal: Ventilated patient's risk of aspiration is minimized  Description: Assess and monitor vital signs, respiratory status, airway cuff pressure, and labs (WBC)  Monitor for signs of aspiration (tachypnea, cough, rales, wheezing, cyanosis, fever)  - Elevate head of bed 30 degrees if patient has tube feeding   - Monitor tube feeding  Outcome: Progressing     Problem: Nutrition  Goal: Nutrition/Hydration status is improving  Description: Monitor and assess patient's nutrition/hydration status for malnutrition (ex- brittle hair, bruises, dry skin, pale skin and conjunctiva, muscle wasting, smooth red tongue, and disorientation)  Collaborate with interdisciplinary team and initiate plan and interventions as ordered  Monitor patient's weight and dietary intake as ordered or per policy  Utilize nutrition screening tool and intervene per policy  Determine patient's food preferences and provide high-protein, high-caloric foods as appropriate  - Assist patient with eating   - Allow adequate time for meals   - Encourage patient to take dietary supplement as ordered  - Collaborate with clinical nutritionist   - Include patient/family/caregiver in decisions related to nutrition    Outcome: Progressing     Problem: PAIN - ADULT  Goal: Verbalizes/displays adequate comfort level or baseline comfort level  Description: Interventions:  - Encourage patient to monitor pain and request assistance  - Assess pain using appropriate pain scale  - Administer analgesics based on type and severity of pain and evaluate response  - Implement non-pharmacological measures as appropriate and evaluate response  - Consider cultural and social influences on pain and pain management  - Notify physician/advanced practitioner if interventions unsuccessful or patient reports new pain  Outcome: Progressing     Problem: INFECTION - ADULT  Goal: Absence or prevention of progression during hospitalization  Description: INTERVENTIONS:  - Assess and monitor for signs and symptoms of infection  - Monitor lab/diagnostic results  - Monitor all insertion sites, i e  indwelling lines, tubes, and drains  - Monitor endotracheal if appropriate and nasal secretions for changes in amount and color  - [place] appropriate cooling/warming therapies per order  - Administer medications as ordered  - Instruct and encourage patient and family to use good hand hygiene technique  - Identify and instruct in appropriate isolation precautions for identified infection/condition  Outcome: Progressing     Problem: SAFETY ADULT  Goal: Patient will remain free of falls  Description: INTERVENTIONS:  - Assess patient frequently for physical needs  -  Identify cognitive and physical deficits and behaviors that affect risk of falls    -  [place] fall precautions as indicated by assessment   - Educate patient/family on patient safety including physical limitations  - Instruct patient to call for assistance with activity based on assessment  - Modify environment to reduce risk of injury  - Consider OT/PT consult to assist with strengthening/mobility  Outcome: Progressing  Goal: Maintain or return to baseline ADL function  Description: INTERVENTIONS:  -  Assess patient's ability to carry out ADLs; assess patient's baseline for ADL function and identify physical deficits which impact ability to perform ADLs (bathing, care of mouth/teeth, toileting, grooming, dressing, etc )  - Assess/evaluate cause of self-care deficits   - Assess range of motion  - Assess patient's mobility; develop plan if impaired  - Assess patient's need for assistive devices and provide as appropriate  - Encourage maximum independence but intervene and supervise when necessary  - Involve family in performance of ADLs  - Assess for home care needs following discharge   - Consider OT consult to assist with ADL evaluation and planning for discharge  - Provide patient education as appropriate  Outcome: Progressing  Goal: Maintain or return mobility status to optimal level  Description: INTERVENTIONS:  - Assess patient's baseline mobility status (ambulation, transfers, stairs, etc )    - Identify cognitive and physical deficits and behaviors that affect mobility  - Identify mobility aids required to assist with transfers and/or ambulation (gait belt, sit-to-stand, lift, walker, cane, etc )  - [place] fall precautions as indicated by assessment  - Record patient progress and toleration of activity level on Mobility SBAR; progress patient to next Phase/Stage  - Instruct patient to call for assistance with activity based on assessment  - Consider rehabilitation consult to assist with strengthening/weightbearing, etc   Outcome: Progressing     Problem: DISCHARGE PLANNING  Goal: Discharge to home or other facility with appropriate resources  Description: INTERVENTIONS:  - Identify barriers to discharge w/patient and caregiver  - Arrange for needed discharge resources and transportation as appropriate  - Identify discharge learning needs (meds, wound care, etc )  - Arrange for interpretive services to assist at discharge as needed  - Refer to Case Management Department for coordinating discharge planning if the patient needs post-hospital services based on physician/advanced practitioner order or complex needs related to functional status, cognitive ability, or social support system  Outcome: Progressing     Problem: Knowledge Deficit  Goal: Patient/family/caregiver demonstrates understanding of disease process, treatment plan, medications, and discharge instructions  Description: Complete learning assessment and assess knowledge base  Interventions:  - Provide teaching at level of understanding  - Provide teaching via preferred learning methods  Outcome: Progressing     Problem: Potential for Falls  Goal: Patient will remain free of falls  Description: INTERVENTIONS:  - Assess patient frequently for physical needs  -  Identify cognitive and physical deficits and behaviors that affect risk of falls    -  [place] fall precautions as indicated by assessment   - Educate patient/family on patient safety including physical limitations  - Instruct patient to call for assistance with activity based on assessment  - Modify environment to reduce risk of injury  - Consider OT/PT consult to assist with strengthening/mobility  Outcome: Progressing

## 2021-01-26 NOTE — PROGRESS NOTES
Progress Note - Ronnie Richardson 1980, 41 y o  male MRN: [id number]    Unit/Bed#: E4 -01 Encounter: [id number]    Primary Care Provider: No primary care provider on file  Date and time admitted to hospital: 1/20/2021 10:49 PM        * Acute CVA (cerebrovascular accident) [facility])  Assessment & Plan  · Acute left MCA CVA unable to fit into MRI  · Seen by neurology  Transesophageal echocardiogram with bubble study demonstrates PFO  · Continue on aspirin and clopidogrel  Lower extremity duplex positive for DVT  · Case discussed with neurology and cardiology  Will discontinue aspirin/clopidogrel and start bridging enoxaparin to warfarin    Super obese  Assessment & Plan  · Body mass index is 48 18 kg/m²  Rhabdomyolysis  Assessment & Plan  · Nontraumatic rhabdomyolysis secondary to being found down  · Resolved with IV fluids    Results from last 7 days   Lab Units 01/23/21  0610 01/21/21  0810 01/20/21  2332   CK TOTAL U/L 1,053* 5,776* 17,316*       Hypertensive urgency  Assessment & Plan  · Hypertensive urgency started on amlodipine and lisinopril now with improved vitals    Transaminitis  Assessment & Plan  · Transaminitis secondary to hepatic steatosis alcohol use and rhabdomyolysis  · GI following    Results from last 7 days   Lab Units 01/26/21  0516 01/25/21  0508 01/24/21  0240 01/23/21  0610 01/22/21  1206 01/21/21  0618 01/20/21  2332   AST U/L 33 56* 108* 161* 266* 743* 1,099*   ALT U/L 442* 695* 1,042* 1,320* 1,834* 2,552* 3,144*   TOTAL BILIRUBIN mg/dL 1 00 0 66 0 57 0 56 0 83 0 94 1 01*       Drug abuse (HCC)  Assessment & Plan  · Alcohol, anabolic steroids, and percocet use  · Advised cessation    Alcohol abuse  Assessment & Plan  · Alcohol use without active withdrawal symptoms    · Continue thiamine and folic acid    Elevated troponin  Assessment & Plan  · Non MI elevation troponin secondary to rhabdomyolysis  · Seen by cardiology    Results from last 7 days   Lab Units 01/21/21  0618 01/21/21  0238 01/20/21  2332   TROPONIN I ng/mL 7 76* 10 00* 9 33*       VTE Pharmacologic Prophylaxis: VTE Score: 10 High Risk (Score >/= 5) - Pharmacological DVT Prophylaxis Ordered: Enoxaparin (Lovenox)  Sequential Compression Devices Ordered  Patient Centered Rounds: I have performed bedside rounds with nursing staff today  Discussions with Specialists or Other Care Team Provider:  Cardiology and Neurology    Education and Discussions with Family / Patient:  Left detailed voicemail with sister    Time Spent for Care: 25 mins  More than 50% of total time spent on counseling and coordination of care as described above  Current Length of Stay: 5 day(s)  Current Patient Status: Inpatient   Certification Statement: The patient will continue to require additional inpatient hospital stay due to need for anticoagulation  Discharge Plan / Estimated Discharge Date: Anticipate discharge in 48-72 hrs to home  Code Status: Level 1 - Full Code      Subjective:   Patient seen and examined  Feeling low bit better, no further cough  Found to have DVT    Objective:   Vitals: Blood pressure 136/71, pulse 75, temperature 98 °F (36 7 °C), temperature source Tympanic, resp  rate 20, height 5' 9" (1 753 m), weight (!) 148 kg (326 lb 4 5 oz), SpO2 94 %  Physical Exam  Vitals signs reviewed  Constitutional:       General: He is not in acute distress  Cardiovascular:      Rate and Rhythm: Regular rhythm  Heart sounds: Normal heart sounds  Pulmonary:      Effort: Pulmonary effort is normal       Breath sounds: No wheezing  Abdominal:      General: Bowel sounds are normal       Palpations: Abdomen is soft  Tenderness: There is no abdominal tenderness  There is no rebound  Musculoskeletal:         General: Swelling present  No tenderness  Comments: +1 edema lower extremities bilaterally   Skin:     General: Skin is warm and dry     Neurological:      Mental Status: He is alert and oriented to person, place, and time  Cranial Nerves: No cranial nerve deficit     Psychiatric:         Mood and Affect: Mood normal        Additional Data:   Labs:  Results from last 7 days   Lab Units 01/26/21  0516 01/25/21  0508 01/24/21  0240 01/23/21  0844 01/22/21  1451 01/21/21  0618 01/21/21  0104 01/20/21  2332   WBC Thousand/uL 16 56* 9 26 8 42 6 92  --  9 77  --  9 45   HEMOGLOBIN g/dL 10 6* 10 9* 11 0* 10 3*  --  9 0*  --  10 1*   HEMATOCRIT % 37 3 40 1 40 0 37 3  --  32 3*  --  35 5*   MCV fL 71* 72* 70* 70*  --  70*  --  69*   PLATELETS Thousands/uL 302 318 290 272  --  251  --  295   INR   --   --   --   --  1 25*  --  1 36*  --      Results from last 7 days   Lab Units 01/26/21  0516 01/25/21  0508 01/24/21  0240 01/23/21  0610 01/22/21  1206 01/21/21  0618 01/20/21  2332   SODIUM mmol/L 140 142 140 139 139 142 141   POTASSIUM mmol/L 4 0 4 2 3 8 4 1 3 8 4 0 3 8   CHLORIDE mmol/L 104 105 104 105 106 105 102   CO2 mmol/L 29 28 30 28 27 30 31   ANION GAP mmol/L 7 9 6 6 6 7 8   BUN mg/dL 22 15 14 11 12 17 23   CREATININE mg/dL 1 28 1 17 1 25 1 13 1 01 1 19 1 34*   CALCIUM mg/dL 8 4 9 2 9 1 8 5 8 6 8 0* 9 0   ALBUMIN g/dL 3 0* 3 1* 3 2* 3 0* 3 0* 2 9* 3 3*   TOTAL BILIRUBIN mg/dL 1 00 0 66 0 57 0 56 0 83 0 94 1 01*   ALK PHOS U/L 63 63 67 62 63 59 70   ALT U/L 442* 695* 1,042* 1,320* 1,834* 2,552* 3,144*   AST U/L 33 56* 108* 161* 266* 743* 1,099*   EGFR ml/min/1 73sq m 69 76 71 80 91 75 65   GLUCOSE RANDOM mg/dL 95 97 88 90 111 83 89     Results from last 7 days   Lab Units 01/22/21  1206 01/21/21  0618   MAGNESIUM mg/dL 1 8 2 1     Results from last 7 days   Lab Units 01/23/21  0610 01/21/21  0810 01/21/21  0618 01/21/21  0238 01/20/21  2332   CK TOTAL U/L 1,053* 5,776*  --   --  17,316*   TROPONIN I ng/mL  --   --  7 76* 10 00* 9 33*   CK MB INDEX % <1 0 <1 0  --   --  <1 0     Results from last 7 days   Lab Units 01/22/21  1206   NT-PRO BNP pg/mL 148*          Results from last 7 days   Lab Units 01/25/21  1023 01/24/21  2137   POC GLUCOSE mg/dl 96 77     Results from last 7 days   Lab Units 01/21/21  0618   HEMOGLOBIN A1C % 5 8*         * I Have Reviewed All Lab Data Listed Above  Cultures:   Results from last 7 days   Lab Units 01/20/21  2351   INFLUENZA A PCR  Negative       Results from last 7 days   Lab Units 01/20/21  2351   SARS-COV-2  Negative   INFLUENZA A PCR  Negative   INFLUENZA B PCR  Negative   RSV PCR  Negative           Lines/Drains:  Invasive Devices     Peripheral Intravenous Line            Peripheral IV 01/24/21 Left Antecubital 2 days              Telemetry:   Telemetry Orders (From admission, onward)             48 Hour Telemetry Monitoring  Continuous x 48 hours     Question:  Reason for 48 Hour Telemetry  Answer:  Acute CVA (<24 hrs old, hemispheric strokes, selected brainstem strokes, cardiac arrhythmias)                  Imaging:  Imaging Reports Reviewed Today Include:   Cta Head And Neck W Wo Contrast    Result Date: 1/21/2021  Impression: Stable subacute ischemia left MCA distribution, embolic in nature based on discontiguous foci of involvement  No nancy hemorrhage, no change in minimal mass effect  No large vessel flow restrictive disease within the head or neck  Slightly diminished distal left MCA branches  Workstation performed: [id number]     Ct Head Without Contrast    Result Date: 1/21/2021  Impression: Subacute left MCA territory infarction, as described above  No evidence of hemorrhagic conversion  Neurology consultation and follow-up is recommended  I personally discussed this study with DR Brenna Bang on 1/21/2021 at 12:59 AM  Workstation performed: [id number]     Ct Spine Cervical Without Contrast    Result Date: 1/21/2021  Impression: No cervical spine fracture or traumatic malalignment  There is mild gentle reversal of cervical lordosis  Mild degenerative changes are present    Workstation performed: [id number]     Ct Chest Abdomen Pelvis W Contrast    Result Date: 1/21/2021  Impression: No evidence of acute intrathoracic, intra-abdominal or intrapelvic parenchymal organ injury  No pneumothorax  There is a moderate to large hiatal hernia  Bilateral gynecomastia  I personally discussed this study with DR Brenna Bang on 1/21/2021 at 12:59 AM  Workstation performed: [id number]      Right Upper Quadrant    Result Date: 1/22/2021  Impression: Evidence of hepatic steatosis with sparing adjacent to the portal vein  No gallstones  Normal ducts   Workstation performed: [id number]     Scheduled Meds:  Current Facility-Administered Medications   Medication Dose Route Frequency Provider Last Rate    acetaminophen  650 mg Oral Q4H PRN Adriana Joaquim, CRNP      aluminum-magnesium hydroxide-simethicone  30 mL Oral Q6H PRN Adriana Joaquim, CRNP      amLODIPine  5 mg Oral Daily Aloma Nora, DO      aspirin  81 mg Oral Daily Adriana Joaquim, CRNP      atorvastatin  40 mg Oral QPM Adriana Joaquim, CRNP      benzonatate  200 mg Oral TID Debra Alfaro, DO      clopidogrel  75 mg Oral Daily Adriana Joaquim, CRNP      enoxaparin  30 mg Subcutaneous Q12H [address], DO      folic acid  1 mg Oral Daily Adriana Joaquim, CRNP      hydrOXYzine HCL  50 mg Oral TID PRN Adriana Joaquim, CRNP      Labetalol HCl  10 mg Intravenous Q6H PRN Aloma Jansky, DO      lisinopril  20 mg Oral Daily Aloma Jansky, DO      metoclopramide  10 mg Intravenous Q6H PRN Adriana Joaquim, CRNP      mirtazapine  30 mg Oral HS Adriana Joaquim, CRNP      multivitamin-minerals  1 tablet Oral Daily Adriana Joaquim, [address]      nitroglycerin  0 4 mg Sublingual Q5 Min PRN Adriana Joaquim, CRNP      oxyCODONE  5 mg Oral Q6H PRN Adriana Joaquim, CRNP      senna  2 tablet Oral Daily PRN Adriana Joaquim, CRNP      thiamine  100 mg Oral Daily Adriana Joaquim, CRNP         Debra Colonclari, DO  [address] [facility]  Hospitalist    ** Please Note: This note has been constructed using a voice recognition system   **

## 2021-01-26 NOTE — ANESTHESIA POSTPROCEDURE EVALUATION
Post-Op Assessment Note    CV Status:  Stable  Pain Score: 2    Pain management: adequate     Mental Status:  Alert and awake   Hydration Status:  Euvolemic and stable   PONV Controlled:  Controlled   Airway Patency:  Patent      Post Op Vitals Reviewed: Yes      Staff: Anesthesiologist         No complications documented      BP      Temp      Pulse     Resp      SpO2

## 2021-01-26 NOTE — PLAN OF CARE
Problem: OCCUPATIONAL THERAPY ADULT  Goal: Performs self-care activities at highest level of function for planned discharge setting  See evaluation for individualized goals  Description: Treatment Interventions: ADL retraining, UE strengthening/ROM, Functional transfer training, Endurance training, Patient/family training, Equipment evaluation/education, Compensatory technique education, Continued evaluation, Activityengagement          See flowsheet documentation for full assessment, interventions and recommendations     Outcome: Progressing  Note: Limitation: Decreased ADL status, Decreased UE strength, Decreased Safe judgement during ADL, Decreased cognition, Decreased endurance, Decreased sensation, Decreased self-care trans, Decreased high-level ADLs  Prognosis: Fair  Assessment: see note     OT Discharge Recommendation: Other (Comment)(TBD pending progress, will continue to monitor)

## 2021-01-26 NOTE — ASSESSMENT & PLAN NOTE
· Transaminitis secondary to hepatic steatosis alcohol use and rhabdomyolysis  · GI following    Results from last 7 days   Lab Units 01/26/21  0516 01/25/21  0508 01/24/21  0240 01/23/21  0610 01/22/21  1206 01/21/21  0618 01/20/21  2332   AST U/L 33 56* 108* 161* 266* 743* 1,099*   ALT U/L 442* 695* 1,042* 1,320* 1,834* 2,552* 3,144*   TOTAL BILIRUBIN mg/dL 1 00 0 66 0 57 0 56 0 83 0 94 1 01*

## 2021-01-26 NOTE — PROGRESS NOTES
Progress Note - Neurology   Iraida Fallon 41 y o  male MRN: [id number]  Unit/Bed#: E4 -01 Encounter: [id number]      Assessment/Plan   41 y  o  male with HTN, chronic daily alcohol use, drug use including Percocet, benzos and anabolic steroids, depression and anxiety who presented to the ED with confusion, word-finding difficulties, visual disturbance and mild R sided weakness and numbness after waking up on his floor, possibly lying there for 3 days  CTH on presentation confirmed L MCA infarct       Embolic stroke workup underway, Interestingly ANA noted a moderate-sized PFO and lower extremity Dopplers today noted a left gastrocnemius vein DVT (making concern for paradoxical embolism through PFO as stroke etiology quite high)  * Acute CVA ([facility]brovascular accident) [facility])  Assessment & Plan  - stroke pathway ongoing:  - CTH demonstrated subacute left MCA territory infarction involving the left posterior frontal parietal region and left frontal lobe  No evidence of hemorrhagic conversion     - CTA head/neck demonstrated subtle under filling of the distal left MCA branches with no proximal obstructive disease or LVO  Otherwise unremarkable  - MRI brain unable to be performed due to patient's body habitus  CT was done about 3 days post symptoms onset - do not feel it necessary to order followup CT at this time  - ANA performed yesterday, noted moderate-sized PFO with bubble study  - lower extremity Dopplers today with acute left gastrocnemius vein DVT  - had repeat CT head today, stable L MCA infarct size, improved edema, no nancy hemorrhage  - given acute DVT, primary team planning for Lovenox to Coumadin bridge (from neurology standpoint once [facility] is started does not need antiplatelet therapy, can discontinue aspirin and Plavix)  - Atorvastatin 40mg  - thrombosis panel, following results     -hemoglobin A1C of 5 8, lipid panel with LDL of 46  - goal normotension  - Telemetry  - Secondary risk factor modification   - PT/OT/ST  - he will follow closely with interventional cardiology as an outpatient to discuss timing of PFO closure    Rhabdomyolysis  Assessment & Plan  - Reports of collapsing to the ground and lying on the floor for 3 days  - total CK 18557 on presentation, trending downwards, last was 1053 on 01/23  - IVF  - Management as per primary team    Transaminitis  Assessment & Plan  - Chronic daily alcohol use  - AST/ALT 56/695  - Management as per GI/Primary team    Alcohol abuse  Assessment & Plan  - Daily alcohol use  - "At least 40 oz of beer or a 12 pack of beer plus liquor"  - States he relpased a few months ago with longest duration of sobriety being 5 months  - Labs consistent with liver failure, likely in the setting of chronic alcohol use  - [facility] protocol  - Thiamine, Folate    No further inpatient neurologic workup, okay from neurologic standpoint for discharge  Discussed plan of care with attending neurologist     Ruchi Patricia will need follow up in in 4 weeks with neurovascular attending or advance practitioner  He will not require outpatient neurological testing  Subjective:   Patient resting in bedside chair, appears more comfortable today, overall feels better, still with cognitive impairment  No new/focal neurologic symptoms or deficits since yesterday  Vitals: Blood pressure 136/71, pulse 75, temperature 98 °F (36 7 °C), temperature source Tympanic, resp  rate 20, height 5' 9" (1 753 m), weight (!) 148 kg (326 lb 4 5 oz), SpO2 94 %  ,Body mass index is 48 18 kg/m²  Physical Exam:  Physical Exam  Constitutional:       Appearance: Normal appearance  He is obese  HENT:      Head: Normocephalic and atraumatic  Eyes:      Extraocular Movements: Extraocular movements intact  Conjunctiva/sclera: Conjunctivae normal       Pupils: Pupils are equal, round, and reactive to light  Neck:      Musculoskeletal: Normal range of motion and neck supple     Cardiovascular:      Rate and Rhythm: Normal rate and regular rhythm  Pulmonary:      Effort: Pulmonary effort is normal       Breath sounds: Normal breath sounds  Abdominal:      General: Bowel sounds are normal       Palpations: Abdomen is soft  Neurological:      Mental Status: He is alert and oriented to person, place, and time  Deep Tendon Reflexes: Strength normal         Neurologic Exam     Mental Status   Oriented to person, place, and time  Some trouble with complex cognitive testing as well as multi step command following, conversing without clear dysarthria or aphasia  Following simple central and appendicular commands  Cranial Nerves     CN II   Visual fields full to confrontation  CN III, IV, VI   Pupils are equal, round, and reactive to light  CN V   Facial sensation intact  CN VII   Facial expression full, symmetric  CN VIII   CN VIII normal      CN IX, X   CN IX normal    CN X normal      CN XI   CN XI normal      CN XII   CN XII normal      Motor Exam     Strength   Strength 5/5 throughout  Sensory Exam   Light touch normal      Gait, Coordination, and Reflexes     Tremor   Resting tremor: absent  Intention tremor: absent      Lab, Imaging and other studies:   CT head 01/26/2021: Expected evolution of left MCA ischemic edema  Diminishing edema and mass effect  No evidence for nancy hemorrhage  Minor petechial hemorrhage cannot be excluded  LE dopplers 01/26/2021: RIGHT LOWER LIMB:  No evidence of acute or chronic deep vein thrombosis  No evidence of superficial thrombophlebitis noted  Doppler evaluation shows a normal response to augmentation maneuvers  Popliteal, posterior tibial and anterior tibial arterial Doppler waveforms are  triphasic  LEFT LOWER LIMB:  Evidence of acute deep vein thrombosis identified in on of the paired  gastrocnemius veins  No evidence of superficial thrombophlebitis noted    Doppler evaluation shows a normal response to augmentation maneuvers    Popliteal, posterior tibial and anterior tibial arterial Doppler waveforms are  triphasic  CBC:   Results from last 7 days   Lab Units 01/26/21  0516 01/25/21  0508 01/24/21  0240   WBC Thousand/uL 16 56* 9 26 8 42   RBC Million/uL 5 23 5 60 5 68*   HEMOGLOBIN g/dL 10 6* 10 9* 11 0*   HEMATOCRIT % 37 3 40 1 40 0   MCV fL 71* 72* 70*   PLATELETS Thousands/uL 302 318 290   , BMP/CMP:   Results from last 7 days   Lab Units 01/26/21  0516 01/25/21  0508 01/24/21  0240   SODIUM mmol/L 140 142 140   POTASSIUM mmol/L 4 0 4 2 3 8   CHLORIDE mmol/L 104 105 104   CO2 mmol/L 29 28 30   BUN mg/dL 22 15 14   CREATININE mg/dL 1 28 1 17 1 25   CALCIUM mg/dL 8 4 9 2 9 1   AST U/L 33 56* 108*   ALT U/L 442* 695* 1,042*   ALK PHOS U/L 63 63 67   EGFR ml/min/1 73sq m 69 76 71   , Vitamin B12:   Results from last 7 days   Lab Units 01/21/21  0618   VITAMIN B 12 pg/mL 2,191*   , HgBA1C:   Results from last 7 days   Lab Units 01/21/21  0618   HEMOGLOBIN A1C % 5 8*   , TSH:   , Coagulation:   Results from last 7 days   Lab Units 01/22/21  1451   INR  1 25*   , Lipid Profile:   Results from last 7 days   Lab Units 01/21/21  0618   HDL mg/dL 25*   LDL CALC mg/dL 46   TRIGLYCERIDES mg/dL 80   , Ammonia:   , Urinalysis:   Results from last 7 days   Lab Units 01/21/21  0145   COLOR UA  Yellow   CLARITY UA  Clear   SPEC GRAV UA  1 015   PH UA  7 0   LEUKOCYTES UA  Negative   NITRITE UA  Negative   GLUCOSE UA mg/dl Negative   KETONES UA mg/dl Negative   BILIRUBIN UA  Negative   BLOOD UA  Trace*   , Drug Screen:   Results from last 7 days   Lab Units 01/21/21  0107   BARBITURATE UR  Negative   BENZODIAZEPINE UR  Positive*   THC UR  Negative   COCAINE UR  Negative   METHADONE URINE  Negative   OPIATE UR  Negative   PCP UR  Negative   , Medication Drug Levels:       Invalid input(s): CARBAMAZEPINE,  PHENOBARB, LACOSAMIDE, OXCARBAZEPINE  VTE Prophylaxis: Sequential compression device (Venodyne) , Enoxaparin (Lovenox) and Warfarin (Coumadin)    Total time spent today 25 minutes

## 2021-01-27 LAB
ALBUMIN SERPL BCP-MCNC: 3.2 G/DL (ref 3.5–5)
ALP SERPL-CCNC: 65 U/L (ref 46–116)
ALT SERPL W P-5'-P-CCNC: 369 U/L (ref 12–78)
ANION GAP SERPL CALCULATED.3IONS-SCNC: 8 MMOL/L (ref 4–13)
AST SERPL W P-5'-P-CCNC: 41 U/L (ref 5–45)
BILIRUB SERPL-MCNC: 0.76 MG/DL (ref 0.2–1)
BUN SERPL-MCNC: 18 MG/DL (ref 5–25)
CALCIUM ALBUM COR SERPL-MCNC: 9.7 MG/DL (ref 8.3–10.1)
CALCIUM SERPL-MCNC: 9.1 MG/DL (ref 8.3–10.1)
CHLORIDE SERPL-SCNC: 104 MMOL/L (ref 100–108)
CK MB SERPL-MCNC: 2.2 NG/ML (ref 0–5)
CK MB SERPL-MCNC: <1 % (ref 0–2.5)
CK SERPL-CCNC: 258 U/L (ref 39–308)
CO2 SERPL-SCNC: 26 MMOL/L (ref 21–32)
CREAT SERPL-MCNC: 1.13 MG/DL (ref 0.6–1.3)
DSDNA AB SER-ACNC: <1 IU/ML (ref 0–9)
ENA SS-A AB SER-ACNC: <0.2 AI (ref 0–0.9)
ENA SS-B AB SER-ACNC: <0.2 AI (ref 0–0.9)
GFR SERPL CREATININE-BSD FRML MDRD: 80 ML/MIN/1.73SQ M
GLUCOSE SERPL-MCNC: 94 MG/DL (ref 65–140)
INR PPP: 1.16 (ref 0.84–1.19)
POTASSIUM SERPL-SCNC: 3.8 MMOL/L (ref 3.5–5.3)
PROT SERPL-MCNC: 7.6 G/DL (ref 6.4–8.2)
PROTHROMBIN TIME: 14.5 SECONDS (ref 11.6–14.5)
RHEUMATOID FACT SER QL LA: NEGATIVE
RYE IGE QN: NEGATIVE
SODIUM SERPL-SCNC: 138 MMOL/L (ref 136–145)

## 2021-01-27 PROCEDURE — 99232 SBSQ HOSP IP/OBS MODERATE 35: CPT | Performed by: INTERNAL MEDICINE

## 2021-01-27 PROCEDURE — 97112 NEUROMUSCULAR REEDUCATION: CPT | Performed by: PHYSICAL THERAPIST

## 2021-01-27 PROCEDURE — 82553 CREATINE MB FRACTION: CPT | Performed by: INTERNAL MEDICINE

## 2021-01-27 PROCEDURE — 82550 ASSAY OF CK (CPK): CPT | Performed by: INTERNAL MEDICINE

## 2021-01-27 PROCEDURE — 85610 PROTHROMBIN TIME: CPT | Performed by: INTERNAL MEDICINE

## 2021-01-27 PROCEDURE — 80053 COMPREHEN METABOLIC PANEL: CPT | Performed by: INTERNAL MEDICINE

## 2021-01-27 PROCEDURE — 97110 THERAPEUTIC EXERCISES: CPT | Performed by: PHYSICAL THERAPIST

## 2021-01-27 PROCEDURE — 97116 GAIT TRAINING THERAPY: CPT | Performed by: PHYSICAL THERAPIST

## 2021-01-27 RX ADMIN — AMLODIPINE BESYLATE 5 MG: 5 TABLET ORAL at 08:50

## 2021-01-27 RX ADMIN — Medication 1 TABLET: at 08:50

## 2021-01-27 RX ADMIN — BENZONATATE 200 MG: 100 CAPSULE ORAL at 08:50

## 2021-01-27 RX ADMIN — ENOXAPARIN SODIUM 150 MG: 150 INJECTION SUBCUTANEOUS at 08:50

## 2021-01-27 RX ADMIN — THIAMINE HCL TAB 100 MG 100 MG: 100 TAB at 08:50

## 2021-01-27 RX ADMIN — BENZONATATE 200 MG: 100 CAPSULE ORAL at 17:34

## 2021-01-27 RX ADMIN — MIRTAZAPINE 30 MG: 15 TABLET, FILM COATED ORAL at 21:38

## 2021-01-27 RX ADMIN — FOLIC ACID 1 MG: 1 TABLET ORAL at 08:50

## 2021-01-27 RX ADMIN — BENZONATATE 200 MG: 100 CAPSULE ORAL at 21:38

## 2021-01-27 RX ADMIN — LISINOPRIL 20 MG: 20 TABLET ORAL at 08:50

## 2021-01-27 RX ADMIN — WARFARIN SODIUM 7.5 MG: 7.5 TABLET ORAL at 17:34

## 2021-01-27 RX ADMIN — ATORVASTATIN CALCIUM 40 MG: 40 TABLET, FILM COATED ORAL at 17:34

## 2021-01-27 RX ADMIN — ENOXAPARIN SODIUM 150 MG: 150 INJECTION SUBCUTANEOUS at 21:38

## 2021-01-27 RX ADMIN — ALUMINUM HYDROXIDE, MAGNESIUM HYDROXIDE, AND SIMETHICONE 30 ML: 200; 200; 20 SUSPENSION ORAL at 08:50

## 2021-01-27 NOTE — PLAN OF CARE
Problem: Neurological Deficit  Goal: Neurological status is stable or improving  Description: Interventions:  - Monitor and assess patient's level of consciousness, motor function, sensory function, and level of assistance needed for ADLs  - Monitor and report changes from baseline  Collaborate with interdisciplinary team to initiate plan and implement interventions as ordered  - Provide and maintain a safe environment  - Consider seizure precautions  - Consider fall precautions  - Consider aspiration precautions  - Consider bleeding precautions  1/26/2021 2058 by Lety Garcia RN  Outcome: Progressing  1/26/2021 0723 by Lety Garcia RN  Outcome: Progressing     Problem: Activity Intolerance/Impaired Mobility  Goal: Mobility/activity is maintained at optimum level for patient  Description: Interventions:  - Assess and monitor patient  barriers to mobility and need for assistive/adaptive devices  - Assess patient's emotional response to limitations  - Collaborate with interdisciplinary team and initiate plans and interventions as ordered  - Encourage independent activity per ability   - Maintain proper body alignment  - Perform active/passive rom as tolerated/ordered  - Plan activities to conserve energy   - Turn patient as appropriate  1/26/2021 2058 by Lety Garcia RN  Outcome: Progressing  1/26/2021 0723 by Lety Garcia RN  Outcome: Progressing     Problem: Communication Impairment  Goal: Ability to express needs and understand communication  Description: Assess patient's communication skills and ability to understand information  Patient will demonstrate use of effective communication techniques, alternative methods of communication and understanding even if not able to speak  - Encourage communication and provide alternate methods of communication as needed  - Collaborate with case management/ for discharge needs    - Include patient/family/caregiver in decisions related to communication  1/26/2021 2058 by Katie Tovar RN  Outcome: Progressing  1/26/2021 0723 by Katie Tovar RN  Outcome: Progressing     Problem: Potential for Aspiration  Goal: Non-ventilated patient's risk of aspiration is minimized  Description: Assess and monitor vital signs, respiratory status, and labs (WBC)  Monitor for signs of aspiration (tachypnea, cough, rales, wheezing, cyanosis, fever)  - Assess and monitor patient's ability to swallow  - Place patient up in chair to eat if possible  - HOB up at 90 degrees to eat if unable to get patient up into chair   - Supervise patient during oral intake  - Instruct patient/ family to take small bites  - Instruct patient/ family to take small single sips when taking liquids  - Follow patient-specific strategies generated by speech pathologist   1/26/2021 2058 by Katie Tovar RN  Outcome: Progressing  1/26/2021 0723 by Katie Tovar RN  Outcome: Progressing  Goal: Ventilated patient's risk of aspiration is minimized  Description: Assess and monitor vital signs, respiratory status, airway cuff pressure, and labs (WBC)  Monitor for signs of aspiration (tachypnea, cough, rales, wheezing, cyanosis, fever)  - Elevate head of bed 30 degrees if patient has tube feeding   - Monitor tube feeding  1/26/2021 2058 by Katie Tovar RN  Outcome: Progressing  1/26/2021 0723 by Katie Tovar RN  Outcome: Progressing     Problem: Nutrition  Goal: Nutrition/Hydration status is improving  Description: Monitor and assess patient's nutrition/hydration status for malnutrition (ex- brittle hair, bruises, dry skin, pale skin and conjunctiva, muscle wasting, smooth red tongue, and disorientation)  Collaborate with interdisciplinary team and initiate plan and interventions as ordered  Monitor patient's weight and dietary intake as ordered or per policy  Utilize nutrition screening tool and intervene per policy   Determine patient's food preferences and provide high-protein, high-caloric foods as appropriate  - Assist patient with eating   - Allow adequate time for meals   - Encourage patient to take dietary supplement as ordered  - Collaborate with clinical nutritionist   - Include patient/family/caregiver in decisions related to nutrition    1/26/2021 2058 by Kevin Singh RN  Outcome: Progressing  1/26/2021 0723 by Kevin Singh RN  Outcome: Progressing     Problem: PAIN - ADULT  Goal: Verbalizes/displays adequate comfort level or baseline comfort level  Description: Interventions:  - Encourage patient to monitor pain and request assistance  - Assess pain using appropriate pain scale  - Administer analgesics based on type and severity of pain and evaluate response  - Implement non-pharmacological measures as appropriate and evaluate response  - Consider cultural and social influences on pain and pain management  - Notify physician/advanced practitioner if interventions unsuccessful or patient reports new pain  1/26/2021 2058 by Kevin Singh RN  Outcome: Progressing  1/26/2021 0723 by Kevin Singh RN  Outcome: Progressing     Problem: INFECTION - ADULT  Goal: Absence or prevention of progression during hospitalization  Description: INTERVENTIONS:  - Assess and monitor for signs and symptoms of infection  - Monitor lab/diagnostic results  - Monitor all insertion sites, i e  indwelling lines, tubes, and drains  - Monitor endotracheal if appropriate and nasal secretions for changes in amount and color  - [place] appropriate cooling/warming therapies per order  - Administer medications as ordered  - Instruct and encourage patient and family to use good hand hygiene technique  - Identify and instruct in appropriate isolation precautions for identified infection/condition  1/26/2021 2058 by Kevin Singh RN  Outcome: Progressing  1/26/2021 0723 by Kevin Singh RN  Outcome: Progressing     Problem: SAFETY ADULT  Goal: Patient will remain free of falls  Description: INTERVENTIONS:  - Assess patient frequently for physical needs  -  Identify cognitive and physical deficits and behaviors that affect risk of falls    -  [place] fall precautions as indicated by assessment   - Educate patient/family on patient safety including physical limitations  - Instruct patient to call for assistance with activity based on assessment  - Modify environment to reduce risk of injury  - Consider OT/PT consult to assist with strengthening/mobility  1/26/2021 2058 by Tamiko Lombardo RN  Outcome: Progressing  1/26/2021 0723 by Tamiko Lombardo RN  Outcome: Progressing  Goal: Maintain or return to baseline ADL function  Description: INTERVENTIONS:  -  Assess patient's ability to carry out ADLs; assess patient's baseline for ADL function and identify physical deficits which impact ability to perform ADLs (bathing, care of mouth/teeth, toileting, grooming, dressing, etc )  - Assess/evaluate cause of self-care deficits   - Assess range of motion  - Assess patient's mobility; develop plan if impaired  - Assess patient's need for assistive devices and provide as appropriate  - Encourage maximum independence but intervene and supervise when necessary  - Involve family in performance of ADLs  - Assess for home care needs following discharge   - Consider OT consult to assist with ADL evaluation and planning for discharge  - Provide patient education as appropriate  1/26/2021 2058 by Tamiko Lombardo RN  Outcome: Progressing  1/26/2021 0723 by Tamiko Lombardo RN  Outcome: Progressing  Goal: Maintain or return mobility status to optimal level  Description: INTERVENTIONS:  - Assess patient's baseline mobility status (ambulation, transfers, stairs, etc )    - Identify cognitive and physical deficits and behaviors that affect mobility  - Identify mobility aids required to assist with transfers and/or ambulation (gait belt, sit-to-stand, lift, walker, cane, etc )  - [place] fall precautions as indicated by assessment  - Record patient progress and toleration of activity level on Mobility SBAR; progress patient to next Phase/Stage  - Instruct patient to call for assistance with activity based on assessment  - Consider rehabilitation consult to assist with strengthening/weightbearing, etc   1/26/2021 2058 by Jacquelin Quarles RN  Outcome: Progressing  1/26/2021 0723 by Jacquelin Quarles RN  Outcome: Progressing     Problem: DISCHARGE PLANNING  Goal: Discharge to home or other facility with appropriate resources  Description: INTERVENTIONS:  - Identify barriers to discharge w/patient and caregiver  - Arrange for needed discharge resources and transportation as appropriate  - Identify discharge learning needs (meds, wound care, etc )  - Arrange for interpretive services to assist at discharge as needed  - Refer to Case Management Department for coordinating discharge planning if the patient needs post-hospital services based on physician/advanced practitioner order or complex needs related to functional status, cognitive ability, or social support system  1/26/2021 2058 by Jacquelin Quarles RN  Outcome: Progressing  1/26/2021 0723 by Jacquelin Quarles RN  Outcome: Progressing     Problem: Knowledge Deficit  Goal: Patient/family/caregiver demonstrates understanding of disease process, treatment plan, medications, and discharge instructions  Description: Complete learning assessment and assess knowledge base  Interventions:  - Provide teaching at level of understanding  - Provide teaching via preferred learning methods  1/26/2021 2058 by Jacquelin Quarles RN  Outcome: Progressing  1/26/2021 0723 by Jacquelin Quarles RN  Outcome: Progressing     Problem: Potential for Falls  Goal: Patient will remain free of falls  Description: INTERVENTIONS:  - Assess patient frequently for physical needs  -  Identify cognitive and physical deficits and behaviors that affect risk of falls    -  [place] fall precautions as indicated by assessment   - Educate patient/family on patient safety including physical limitations  - Instruct patient to call for assistance with activity based on assessment  - Modify environment to reduce risk of injury  - Consider OT/PT consult to assist with strengthening/mobility  1/26/2021 2058 by Marco Antonio Villegas RN  Outcome: Progressing  1/26/2021 0723 by Marco Antonio Villegas RN  Outcome: Progressing

## 2021-01-27 NOTE — QUICK NOTE
Reviewed PT note from today  Agree that if patient is able to get to mod Ind level of function would be safe for discharge home  If interested in STR, could look at subacute level rehab if still requiring assistance  He is too high functioning for acute inpatient rehab       Blas Falk MD  Physical Medicine and Rehabilitation

## 2021-01-27 NOTE — PHYSICAL THERAPY NOTE
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  Transfers   Sit to Stand 7  Independent   Stand to Sit 7  Independent   Stand pivot 7  Independent   Ambulation/Elevation   Gait pattern WNL   Gait Assistance 5  Supervision   Assistive Device Rolling walker;None   Distance 400'   Stair Management Assistance 5  Supervision   Stair Management Technique Two rails; Alternating pattern; Foreward   Number of Stairs 12   Balance   Static Sitting Normal   Dynamic Sitting Normal   Static Standing Good   Dynamic Standing Fair +   Ambulatory Good   Higher level balance Reading   Higher level balance rep range 20 reps;to the L and to the R  (side steps, tandem walk, retro walk, )   Endurance Deficit   Endurance Deficit No   Activity Tolerance   Activity Tolerance Patient tolerated treatment well   Nurse Made Aware yes   Exercises   Neuro re-ed rapid alternating movements in sitting ble  coordination ther ex  standing hip abd, minisquats, alternating weight bearing ex  Balance training  agility at counter top  side steps  crossing steps, braiding, tandem and retro amb  36' each   Assessment   Prognosis Good   Problem List Decreased coordination; Impaired balance   Assessment pt seen for PT session, pt performed ther ex in standing, agility, stairs and gait training  pt initially started out using rw , finished without using assistive device and was steady  pt also worked on seated le coordination ex  rapid alternating sequences   instructed to to work on ankle alphabet while sitting  pt will be able to return home  recommend OPPT- neuro rehab  Barriers to Discharge Decreased caregiver support   Goals   Patient Goals get back to normal   STG Expiration Date 01/24/21   PT Treatment Day 3   Plan   Treatment/Interventions Gait training; Endurance training; Therapeutic exercise  (high level balance and coordination   d/c walker)   Progress Improving as expected   PT Frequency   (3-5x/week)   AM-PAC Basic Mobility Inpatient   Turning in Bed Without Bedrails 4   Lying on Back to Sitting on Edge of Flat Bed 4   Moving Bed to Chair 4   Standing Up From Chair 4   Walk in Room 4   Climb 3-5 Stairs 4   Basic Mobility Inpatient Raw Score 24   Basic Mobility Standardized Score 57 68     Hillary Restrepo, PT

## 2021-01-27 NOTE — PLAN OF CARE
Problem: PHYSICAL THERAPY ADULT  Goal: Performs mobility at highest level of function for planned discharge setting  See evaluation for individualized goals  Description: Treatment/Interventions: Functional transfer training, LE strengthening/ROM, Elevations, Therapeutic exercise, Endurance training, Cognitive reorientation, Patient/family training, Equipment eval/education, Bed mobility, Gait training, Continued evaluation, Spoke to nursing, OT          See flowsheet documentation for full assessment, interventions and recommendations  Outcome: Adequate for Discharge  Note: Prognosis: Good  Problem List: Decreased coordination, Impaired balance  Assessment: pt seen for PT session, pt performed ther ex in standing, agility, stairs and gait training  pt initially started out using rw , finished without using assistive device and was steady  pt also worked on seated le coordination ex  rapid alternating sequences   instructed to to work on ankle alphabet while sitting  pt will be able to return home  recommend OPPT- neuro rehab  Barriers to Discharge: Decreased caregiver support  Barriers to Discharge Comments: Below functional baseline  PT Discharge Recommendation: (Home vs STR (pending safety and elevations))     PT - OK to Discharge: Yes(Pending d/c disposition)    See flowsheet documentation for full assessment

## 2021-01-28 LAB
C-ANCA TITR SER IF: NORMAL TITER
INR PPP: 1.24 (ref 0.84–1.19)
MYELOPEROXIDASE AB SER IA-ACNC: <9 U/ML (ref 0–9)
P-ANCA ATYPICAL TITR SER IF: NORMAL TITER
P-ANCA TITR SER IF: NORMAL TITER
PROTEINASE3 AB SER IA-ACNC: <3.5 U/ML (ref 0–3.5)
PROTHROMBIN TIME: 15.3 SECONDS (ref 11.6–14.5)

## 2021-01-28 PROCEDURE — 85610 PROTHROMBIN TIME: CPT | Performed by: INTERNAL MEDICINE

## 2021-01-28 PROCEDURE — 99232 SBSQ HOSP IP/OBS MODERATE 35: CPT | Performed by: INTERNAL MEDICINE

## 2021-01-28 PROCEDURE — 97535 SELF CARE MNGMENT TRAINING: CPT

## 2021-01-28 RX ORDER — WARFARIN SODIUM 5 MG/1
10 TABLET ORAL
Status: DISCONTINUED | OUTPATIENT
Start: 2021-01-28 | End: 2021-01-29 | Stop reason: HOSPADM

## 2021-01-28 RX ADMIN — ATORVASTATIN CALCIUM 40 MG: 40 TABLET, FILM COATED ORAL at 17:59

## 2021-01-28 RX ADMIN — ENOXAPARIN SODIUM 150 MG: 150 INJECTION SUBCUTANEOUS at 08:38

## 2021-01-28 RX ADMIN — LISINOPRIL 20 MG: 20 TABLET ORAL at 08:39

## 2021-01-28 RX ADMIN — BENZONATATE 200 MG: 100 CAPSULE ORAL at 21:48

## 2021-01-28 RX ADMIN — Medication 1 TABLET: at 08:39

## 2021-01-28 RX ADMIN — THIAMINE HCL TAB 100 MG 100 MG: 100 TAB at 08:39

## 2021-01-28 RX ADMIN — WARFARIN SODIUM 10 MG: 5 TABLET ORAL at 19:59

## 2021-01-28 RX ADMIN — ENOXAPARIN SODIUM 150 MG: 150 INJECTION SUBCUTANEOUS at 21:49

## 2021-01-28 RX ADMIN — AMLODIPINE BESYLATE 5 MG: 5 TABLET ORAL at 08:39

## 2021-01-28 RX ADMIN — MIRTAZAPINE 30 MG: 15 TABLET, FILM COATED ORAL at 21:48

## 2021-01-28 RX ADMIN — FOLIC ACID 1 MG: 1 TABLET ORAL at 08:39

## 2021-01-28 RX ADMIN — BENZONATATE 200 MG: 100 CAPSULE ORAL at 08:39

## 2021-01-28 RX ADMIN — BENZONATATE 200 MG: 100 CAPSULE ORAL at 17:59

## 2021-01-28 NOTE — PLAN OF CARE
Problem: Neurological Deficit  Goal: Neurological status is stable or improving  Description: Interventions:  - Monitor and assess patient's level of consciousness, motor function, sensory function, and level of assistance needed for ADLs  - Monitor and report changes from baseline  Collaborate with interdisciplinary team to initiate plan and implement interventions as ordered  - Provide and maintain a safe environment  - Consider seizure precautions  - Consider fall precautions  - Consider aspiration precautions  - Consider bleeding precautions  1/27/2021 2030 by Gabriel Zabala RN  Outcome: Progressing  1/27/2021 2029 by Gabriel Zabala RN  Outcome: Progressing     Problem: Activity Intolerance/Impaired Mobility  Goal: Mobility/activity is maintained at optimum level for patient  Description: Interventions:  - Assess and monitor patient  barriers to mobility and need for assistive/adaptive devices  - Assess patient's emotional response to limitations  - Collaborate with interdisciplinary team and initiate plans and interventions as ordered  - Encourage independent activity per ability   - Maintain proper body alignment  - Perform active/passive rom as tolerated/ordered  - Plan activities to conserve energy   - Turn patient as appropriate  1/27/2021 2030 by Gabriel Zabala RN  Outcome: Progressing  1/27/2021 2029 by Gabriel Zabala RN  Outcome: Progressing     Problem: Communication Impairment  Goal: Ability to express needs and understand communication  Description: Assess patient's communication skills and ability to understand information  Patient will demonstrate use of effective communication techniques, alternative methods of communication and understanding even if not able to speak  - Encourage communication and provide alternate methods of communication as needed  - Collaborate with case management/ for discharge needs    - Include patient/family/caregiver in decisions related to communication  1/27/2021 2030 by Tamiko Lombardo RN  Outcome: Progressing  1/27/2021 2029 by Tamiko Lombardo RN  Outcome: Progressing     Problem: Potential for Aspiration  Goal: Non-ventilated patient's risk of aspiration is minimized  Description: Assess and monitor vital signs, respiratory status, and labs (WBC)  Monitor for signs of aspiration (tachypnea, cough, rales, wheezing, cyanosis, fever)  - Assess and monitor patient's ability to swallow  - Place patient up in chair to eat if possible  - HOB up at 90 degrees to eat if unable to get patient up into chair   - Supervise patient during oral intake  - Instruct patient/ family to take small bites  - Instruct patient/ family to take small single sips when taking liquids  - Follow patient-specific strategies generated by speech pathologist   1/27/2021 2030 by Tamiko Lombardo RN  Outcome: Progressing  1/27/2021 2029 by Tamiko Lombardo RN  Outcome: Progressing  Goal: Ventilated patient's risk of aspiration is minimized  Description: Assess and monitor vital signs, respiratory status, airway cuff pressure, and labs (WBC)  Monitor for signs of aspiration (tachypnea, cough, rales, wheezing, cyanosis, fever)  - Elevate head of bed 30 degrees if patient has tube feeding   - Monitor tube feeding  1/27/2021 2030 by Tamiko Lombardo RN  Outcome: Progressing  1/27/2021 2029 by Tamiko Lombardo RN  Outcome: Progressing     Problem: Nutrition  Goal: Nutrition/Hydration status is improving  Description: Monitor and assess patient's nutrition/hydration status for malnutrition (ex- brittle hair, bruises, dry skin, pale skin and conjunctiva, muscle wasting, smooth red tongue, and disorientation)  Collaborate with interdisciplinary team and initiate plan and interventions as ordered  Monitor patient's weight and dietary intake as ordered or per policy  Utilize nutrition screening tool and intervene per policy   Determine patient's food preferences and provide high-protein, high-caloric foods as appropriate  - Assist patient with eating   - Allow adequate time for meals   - Encourage patient to take dietary supplement as ordered  - Collaborate with clinical nutritionist   - Include patient/family/caregiver in decisions related to nutrition    1/27/2021 2030 by Nikki Shrestha RN  Outcome: Progressing  1/27/2021 2029 by Nikki Shrestha RN  Outcome: Progressing     Problem: PAIN - ADULT  Goal: Verbalizes/displays adequate comfort level or baseline comfort level  Description: Interventions:  - Encourage patient to monitor pain and request assistance  - Assess pain using appropriate pain scale  - Administer analgesics based on type and severity of pain and evaluate response  - Implement non-pharmacological measures as appropriate and evaluate response  - Consider cultural and social influences on pain and pain management  - Notify physician/advanced practitioner if interventions unsuccessful or patient reports new pain  1/27/2021 2030 by Nikki Shrestha RN  Outcome: Progressing  1/27/2021 2029 by Nikki Shrestha RN  Outcome: Progressing     Problem: INFECTION - ADULT  Goal: Absence or prevention of progression during hospitalization  Description: INTERVENTIONS:  - Assess and monitor for signs and symptoms of infection  - Monitor lab/diagnostic results  - Monitor all insertion sites, i e  indwelling lines, tubes, and drains  - Monitor endotracheal if appropriate and nasal secretions for changes in amount and color  - [place] appropriate cooling/warming therapies per order  - Administer medications as ordered  - Instruct and encourage patient and family to use good hand hygiene technique  - Identify and instruct in appropriate isolation precautions for identified infection/condition  1/27/2021 2030 by Nikki Shrestha RN  Outcome: Progressing  1/27/2021 2029 by Nikki Shrestha RN  Outcome: Progressing     Problem: SAFETY ADULT  Goal: Patient will remain free of falls  Description: INTERVENTIONS:  - Assess patient frequently for physical needs  -  Identify cognitive and physical deficits and behaviors that affect risk of falls    -  [employer] fall precautions as indicated by assessment   - Educate patient/family on patient safety including physical limitations  - Instruct patient to call for assistance with activity based on assessment  - Modify environment to reduce risk of injury  - Consider OT/PT consult to assist with strengthening/mobility  1/27/2021 2030 by Tyler Romero RN  Outcome: Progressing  1/27/2021 2029 by Tyler Romero RN  Outcome: Progressing  Goal: Maintain or return to baseline ADL function  Description: INTERVENTIONS:  -  Assess patient's ability to carry out ADLs; assess patient's baseline for ADL function and identify physical deficits which impact ability to perform ADLs (bathing, care of mouth/teeth, toileting, grooming, dressing, etc )  - Assess/evaluate cause of self-care deficits   - Assess range of motion  - Assess patient's mobility; develop plan if impaired  - Assess patient's need for assistive devices and provide as appropriate  - Encourage maximum independence but intervene and supervise when necessary  - Involve family in performance of ADLs  - Assess for home care needs following discharge   - Consider OT consult to assist with ADL evaluation and planning for discharge  - Provide patient education as appropriate  1/27/2021 2030 by Tyler Romero RN  Outcome: Progressing  1/27/2021 2029 by Tyler Romero RN  Outcome: Progressing  Goal: Maintain or return mobility status to optimal level  Description: INTERVENTIONS:  - Assess patient's baseline mobility status (ambulation, transfers, stairs, etc )    - Identify cognitive and physical deficits and behaviors that affect mobility  - Identify mobility aids required to assist with transfers and/or ambulation (gait belt, sit-to-stand, lift, walker, cane, etc )  - [place] fall precautions as indicated by assessment  - Record patient progress and toleration of activity level on Mobility SBAR; progress patient to next Phase/Stage  - Instruct patient to call for assistance with activity based on assessment  - Consider rehabilitation consult to assist with strengthening/weightbearing, etc   1/27/2021 2030 by Bob Mercedes RN  Outcome: Progressing  1/27/2021 2029 by Bob Mercedes RN  Outcome: Progressing     Problem: DISCHARGE PLANNING  Goal: Discharge to home or other facility with appropriate resources  Description: INTERVENTIONS:  - Identify barriers to discharge w/patient and caregiver  - Arrange for needed discharge resources and transportation as appropriate  - Identify discharge learning needs (meds, wound care, etc )  - Arrange for interpretive services to assist at discharge as needed  - Refer to Case Management Department for coordinating discharge planning if the patient needs post-hospital services based on physician/advanced practitioner order or complex needs related to functional status, cognitive ability, or social support system  1/27/2021 2030 by Bob Mercedes RN  Outcome: Progressing  1/27/2021 2029 by Bob Mercedes RN  Outcome: Progressing     Problem: Knowledge Deficit  Goal: Patient/family/caregiver demonstrates understanding of disease process, treatment plan, medications, and discharge instructions  Description: Complete learning assessment and assess knowledge base  Interventions:  - Provide teaching at level of understanding  - Provide teaching via preferred learning methods  1/27/2021 2030 by Bob Mercedes RN  Outcome: Progressing  1/27/2021 2029 by Bob Mercedes RN  Outcome: Progressing     Problem: Potential for Falls  Goal: Patient will remain free of falls  Description: INTERVENTIONS:  - Assess patient frequently for physical needs  -  Identify cognitive and physical deficits and behaviors that affect risk of falls    -  [place] fall precautions as indicated by assessment   - Educate patient/family on patient safety including physical limitations  - Instruct patient to call for assistance with activity based on assessment  - Modify environment to reduce risk of injury  - Consider OT/PT consult to assist with strengthening/mobility  1/27/2021 2030 by Tyler Romero RN  Outcome: Progressing  1/27/2021 2029 by Tyler Romero RN  Outcome: Progressing

## 2021-01-28 NOTE — PLAN OF CARE
Problem: Neurological Deficit  Goal: Neurological status is stable or improving  Description: Interventions:  - Monitor and assess patient's level of consciousness, motor function, sensory function, and level of assistance needed for ADLs  - Monitor and report changes from baseline  Collaborate with interdisciplinary team to initiate plan and implement interventions as ordered  - Provide and maintain a safe environment  - Consider seizure precautions  - Consider fall precautions  - Consider aspiration precautions  - Consider bleeding precautions  Outcome: Progressing     Problem: Activity Intolerance/Impaired Mobility  Goal: Mobility/activity is maintained at optimum level for patient  Description: Interventions:  - Assess and monitor patient  barriers to mobility and need for assistive/adaptive devices  - Assess patient's emotional response to limitations  - Collaborate with interdisciplinary team and initiate plans and interventions as ordered  - Encourage independent activity per ability   - Maintain proper body alignment  - Perform active/passive rom as tolerated/ordered  - Plan activities to conserve energy   - Turn patient as appropriate  Outcome: Progressing     Problem: Communication Impairment  Goal: Ability to express needs and understand communication  Description: Assess patient's communication skills and ability to understand information  Patient will demonstrate use of effective communication techniques, alternative methods of communication and understanding even if not able to speak  - Encourage communication and provide alternate methods of communication as needed  - Collaborate with case management/ for discharge needs  - Include patient/family/caregiver in decisions related to communication    Outcome: Progressing     Problem: Potential for Aspiration  Goal: Non-ventilated patient's risk of aspiration is minimized  Description: Assess and monitor vital signs, respiratory status, and labs (WBC)  Monitor for signs of aspiration (tachypnea, cough, rales, wheezing, cyanosis, fever)  - Assess and monitor patient's ability to swallow  - Place patient up in chair to eat if possible  - HOB up at 90 degrees to eat if unable to get patient up into chair   - Supervise patient during oral intake  - Instruct patient/ family to take small bites  - Instruct patient/ family to take small single sips when taking liquids  - Follow patient-specific strategies generated by speech pathologist   Outcome: Progressing  Goal: Ventilated patient's risk of aspiration is minimized  Description: Assess and monitor vital signs, respiratory status, airway cuff pressure, and labs (WBC)  Monitor for signs of aspiration (tachypnea, cough, rales, wheezing, cyanosis, fever)  - Elevate head of bed 30 degrees if patient has tube feeding   - Monitor tube feeding  Outcome: Progressing     Problem: Nutrition  Goal: Nutrition/Hydration status is improving  Description: Monitor and assess patient's nutrition/hydration status for malnutrition (ex- brittle hair, bruises, dry skin, pale skin and conjunctiva, muscle wasting, smooth red tongue, and disorientation)  Collaborate with interdisciplinary team and initiate plan and interventions as ordered  Monitor patient's weight and dietary intake as ordered or per policy  Utilize nutrition screening tool and intervene per policy  Determine patient's food preferences and provide high-protein, high-caloric foods as appropriate  - Assist patient with eating   - Allow adequate time for meals   - Encourage patient to take dietary supplement as ordered  - Collaborate with clinical nutritionist   - Include patient/family/caregiver in decisions related to nutrition    Outcome: Progressing     Problem: PAIN - ADULT  Goal: Verbalizes/displays adequate comfort level or baseline comfort level  Description: Interventions:  - Encourage patient to monitor pain and request assistance  - Assess pain using appropriate pain scale  - Administer analgesics based on type and severity of pain and evaluate response  - Implement non-pharmacological measures as appropriate and evaluate response  - Consider cultural and social influences on pain and pain management  - Notify physician/advanced practitioner if interventions unsuccessful or patient reports new pain  Outcome: Progressing     Problem: INFECTION - ADULT  Goal: Absence or prevention of progression during hospitalization  Description: INTERVENTIONS:  - Assess and monitor for signs and symptoms of infection  - Monitor lab/diagnostic results  - Monitor all insertion sites, i e  indwelling lines, tubes, and drains  - Monitor endotracheal if appropriate and nasal secretions for changes in amount and color  - [place] appropriate cooling/warming therapies per order  - Administer medications as ordered  - Instruct and encourage patient and family to use good hand hygiene technique  - Identify and instruct in appropriate isolation precautions for identified infection/condition  Outcome: Progressing     Problem: SAFETY ADULT  Goal: Patient will remain free of falls  Description: INTERVENTIONS:  - Assess patient frequently for physical needs  -  Identify cognitive and physical deficits and behaviors that affect risk of falls    -  [place] fall precautions as indicated by assessment   - Educate patient/family on patient safety including physical limitations  - Instruct patient to call for assistance with activity based on assessment  - Modify environment to reduce risk of injury  - Consider OT/PT consult to assist with strengthening/mobility  Outcome: Progressing  Goal: Maintain or return to baseline ADL function  Description: INTERVENTIONS:  -  Assess patient's ability to carry out ADLs; assess patient's baseline for ADL function and identify physical deficits which impact ability to perform ADLs (bathing, care of mouth/teeth, toileting, grooming, dressing, etc )  - Assess/evaluate cause of self-care deficits   - Assess range of motion  - Assess patient's mobility; develop plan if impaired  - Assess patient's need for assistive devices and provide as appropriate  - Encourage maximum independence but intervene and supervise when necessary  - Involve family in performance of ADLs  - Assess for home care needs following discharge   - Consider OT consult to assist with ADL evaluation and planning for discharge  - Provide patient education as appropriate  Outcome: Progressing  Goal: Maintain or return mobility status to optimal level  Description: INTERVENTIONS:  - Assess patient's baseline mobility status (ambulation, transfers, stairs, etc )    - Identify cognitive and physical deficits and behaviors that affect mobility  - Identify mobility aids required to assist with transfers and/or ambulation (gait belt, sit-to-stand, lift, walker, cane, etc )  - [place] fall precautions as indicated by assessment  - Record patient progress and toleration of activity level on Mobility SBAR; progress patient to next Phase/Stage  - Instruct patient to call for assistance with activity based on assessment  - Consider rehabilitation consult to assist with strengthening/weightbearing, etc   Outcome: Progressing     Problem: DISCHARGE PLANNING  Goal: Discharge to home or other facility with appropriate resources  Description: INTERVENTIONS:  - Identify barriers to discharge w/patient and caregiver  - Arrange for needed discharge resources and transportation as appropriate  - Identify discharge learning needs (meds, wound care, etc )  - Arrange for interpretive services to assist at discharge as needed  - Refer to Case Management Department for coordinating discharge planning if the patient needs post-hospital services based on physician/advanced practitioner order or complex needs related to functional status, cognitive ability, or social support system  Outcome: Progressing     Problem: Knowledge Deficit  Goal: Patient/family/caregiver demonstrates understanding of disease process, treatment plan, medications, and discharge instructions  Description: Complete learning assessment and assess knowledge base  Interventions:  - Provide teaching at level of understanding  - Provide teaching via preferred learning methods  Outcome: Progressing     Problem: Potential for Falls  Goal: Patient will remain free of falls  Description: INTERVENTIONS:  - Assess patient frequently for physical needs  -  Identify cognitive and physical deficits and behaviors that affect risk of falls    -  [place] fall precautions as indicated by assessment   - Educate patient/family on patient safety including physical limitations  - Instruct patient to call for assistance with activity based on assessment  - Modify environment to reduce risk of injury  - Consider OT/PT consult to assist with strengthening/mobility  Outcome: Progressing

## 2021-01-28 NOTE — ASSESSMENT & PLAN NOTE
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

## 2021-01-28 NOTE — PLAN OF CARE
Problem: OCCUPATIONAL THERAPY ADULT  Goal: Performs self-care activities at highest level of function for planned discharge setting  See evaluation for individualized goals  Description: Treatment Interventions: ADL retraining, UE strengthening/ROM, Functional transfer training, Endurance training, Patient/family training, Equipment evaluation/education, Compensatory technique education, Continued evaluation, Activityengagement          See flowsheet documentation for full assessment, interventions and recommendations  Outcome: Progressing  Note: Limitation: Decreased ADL status, Decreased UE strength, Decreased Safe judgement during ADL, Decreased cognition, Decreased endurance, Decreased sensation, Decreased self-care trans, Decreased high-level ADLs  Prognosis: Fair  Assessment: Pt agreeable to participate in skilled OT treatment session to address ADL retraining, functional transfer training, endurance training, patient/family training, equipment evaluation/education, compensatory technique education, activity engagement and activity tolerance  Pt seated EOB upon arrival  Pt stating "My hand just doesn't want to work, and my brain is another story, I can't remember Sh!#"  Pt agreeable to participate in ADL tasks  Pt completing functional transfers with mod I and functional mobility with (S) to bathroom, standing at the sink pt completing bathing, grooming and dressing tasks  Bathing with mod I, dressing with distant (S) and VC throughout for encouragement for (I) with completion  Pt noted to have no difficulties with handling toothbrush and toothpaste with R hand, despite pt reports of not being able to use hand  Pt reporting deficits with his memory, noted to tell therapist incorrect date, then when story-telling about his missed rent payment, pt able to tell therapist today's date correctly  Pt reports continued deficits with ability to remember banking passwords   Patient continues to be performing below their functional baseline with an increased risk for falls and injury and decreased occupational performance, and would benefit from continued skilled OT treatment to address deficits  The patient's raw score on the AM-PAC Daily Activity inpatient short form is 24, standardized score is 57 54 greater than 39 4  Patients at this level are likely to benefit from DC to home   Pt has made improvements, would benefit from OP OT services including cognitive therapies     OT Discharge Recommendation: Home with skilled therapy(OP OT, including cognitive therapies)

## 2021-01-28 NOTE — PROGRESS NOTES
Progress Note - Jovita Juarez 1980, 41 y o  male MRN: [id number]    Unit/Bed#: E4 -01 Encounter: [id number]    Primary Care Provider: No primary care provider on file  Date and time admitted to hospital: 1/20/2021 10:49 PM        * Acute CVA (cerebrovascular accident) [facility])  Assessment & Plan  · Acute left MCA CVA unable to fit into MRI  · Seen by neurology  Transesophageal echocardiogram with bubble study demonstrates PFO  · Was initially started on aspirin and clopidogrel  Lower extremity duplex positive for DVT  · Case discussed with neurology and cardiology  Discontinued aspirin/clopidogrel and started bridging enoxaparin to warfarin    Super obese  Assessment & Plan  · Body mass index is 48 18 kg/m²  Rhabdomyolysis  Assessment & Plan  · Nontraumatic rhabdomyolysis secondary to being found down  · Resolved with IV fluids    Results from last 7 days   Lab Units 01/27/21  0519 01/23/21  0610 01/21/21  0810 01/20/21  2332   CK TOTAL U/L 258 1,053* 5,776* 17,316*       Hypertensive urgency  Assessment & Plan  · Hypertensive urgency started on amlodipine and lisinopril now stable    Transaminitis  Assessment & Plan  · Transaminitis secondary to hepatic steatosis alcohol use and rhabdomyolysis  · GI following    Results from last 7 days   Lab Units 01/27/21  0519 01/26/21  0516 01/25/21  0508 01/24/21  0240 01/23/21  0610 01/22/21  1206 01/21/21  0618 01/20/21  2332   AST U/L 41 33 56* 108* 161* 266* 743* 1,099*   ALT U/L 369* 442* 695* 1,042* 1,320* 1,834* 2,552* 3,144*   TOTAL BILIRUBIN mg/dL 0 76 1 00 0 66 0 57 0 56 0 83 0 94 1 01*       Drug abuse (HCC)  Assessment & Plan  · Alcohol, anabolic steroids, and percocet use  · Advised cessation    Alcohol abuse  Assessment & Plan  · Alcohol use without active withdrawal symptoms    · Continue thiamine and folic acid    Elevated troponin  Assessment & Plan  · Non MI elevation troponin secondary to rhabdomyolysis  · Seen by cardiology    Results from last 7 days   Lab Units 01/21/21  0618 01/21/21  0238 01/20/21  2332   TROPONIN I ng/mL 7 76* 10 00* 9 33*       VTE Pharmacologic Prophylaxis: VTE Score: 10 High Risk (Score >/= 5) - Pharmacological DVT Prophylaxis Ordered: Enoxaparin (Lovenox)  Sequential Compression Devices Ordered  Patient Centered Rounds: I have performed bedside rounds with nursing staff today  Discussions with Specialists or Other Care Team Provider:  Case management    Education and Discussions with Family / Patient:     Time Spent for Care: 25 mins  More than 50% of total time spent on counseling and coordination of care as described above  Current Length of Stay: 6 day(s)  Current Patient Status: Inpatient   Certification Statement: The patient will continue to require additional inpatient hospital stay due to subtherapeutic INR  Discharge Plan / Estimated Discharge Date: Anticipate discharge in 48 hrs to home  Code Status: Level 1 - Full Code      Subjective:   Patient seen and examined  No new complaints  Anxious about going home    Objective:   Vitals: Blood pressure 165/96, pulse 76, temperature 97 9 °F (36 6 °C), temperature source Temporal, resp  rate 20, height 5' 9" (1 753 m), weight (!) 148 kg (326 lb 4 5 oz), SpO2 96 %  Physical Exam  Vitals signs reviewed  Constitutional:       General: He is not in acute distress  Cardiovascular:      Rate and Rhythm: Regular rhythm  Heart sounds: Normal heart sounds  Pulmonary:      Effort: Pulmonary effort is normal       Breath sounds: Decreased breath sounds present  No wheezing  Abdominal:      General: Bowel sounds are normal       Palpations: Abdomen is soft  Tenderness: There is no abdominal tenderness  There is no rebound  Musculoskeletal:         General: No swelling or tenderness  Skin:     General: Skin is warm and dry  Neurological:      Mental Status: He is alert and oriented to person, place, and time        Cranial Nerves: No cranial nerve deficit     Psychiatric:         Mood and Affect: Mood normal        Additional Data:   Labs:  Results from last 7 days   Lab Units 01/27/21  0519 01/26/21  1722 01/26/21  0516 01/25/21  0508 01/24/21  0240 01/23/21  0844 01/22/21  1451 01/21/21  0618 01/21/21  0104 01/20/21  2332   WBC Thousand/uL  --   --  16 56* 9 26 8 42 6 92  --  9 77  --  9 45   HEMOGLOBIN g/dL  --   --  10 6* 10 9* 11 0* 10 3*  --  9 0*  --  10 1*   HEMATOCRIT %  --   --  37 3 40 1 40 0 37 3  --  32 3*  --  35 5*   MCV fL  --   --  71* 72* 70* 70*  --  70*  --  69*   PLATELETS Thousands/uL  --   --  302 318 290 272  --  251  --  295   INR  1 16 1 16  --   --   --   --  1 25*  --  1 36*  --      Results from last 7 days   Lab Units 01/27/21  0519 01/26/21  0516 01/25/21  0508 01/24/21  0240 01/23/21  0610 01/22/21  1206 01/21/21  0618 01/20/21  2332   SODIUM mmol/L 138 140 142 140 139 139 142 141   POTASSIUM mmol/L 3 8 4 0 4 2 3 8 4 1 3 8 4 0 3 8   CHLORIDE mmol/L 104 104 105 104 105 106 105 102   CO2 mmol/L 26 29 28 30 28 27 30 31   ANION GAP mmol/L 8 7 9 6 6 6 7 8   BUN mg/dL 18 22 15 14 11 12 17 23   CREATININE mg/dL 1 13 1 28 1 17 1 25 1 13 1 01 1 19 1 34*   CALCIUM mg/dL 9 1 8 4 9 2 9 1 8 5 8 6 8 0* 9 0   ALBUMIN g/dL 3 2* 3 0* 3 1* 3 2* 3 0* 3 0* 2 9* 3 3*   TOTAL BILIRUBIN mg/dL 0 76 1 00 0 66 0 57 0 56 0 83 0 94 1 01*   ALK PHOS U/L 65 63 63 67 62 63 59 70   ALT U/L 369* 442* 695* 1,042* 1,320* 1,834* 2,552* 3,144*   AST U/L 41 33 56* 108* 161* 266* 743* 1,099*   EGFR ml/min/1 73sq m 80 69 76 71 80 91 75 65   GLUCOSE RANDOM mg/dL 94 95 97 88 90 111 83 89     Results from last 7 days   Lab Units 01/22/21  1206 01/21/21  0618   MAGNESIUM mg/dL 1 8 2 1     Results from last 7 days   Lab Units 01/27/21  0519 01/23/21  0610 01/21/21  0810 01/21/21  0618 01/21/21  0238 01/20/21  2332   CK TOTAL U/L 258 1,053* 5,776*  --   --  17,316*   TROPONIN I ng/mL  --   --   --  7 76* 10 00* 9 33*   CK MB INDEX % <1 0 <1 0 <1 0  --   --  <1 0     Results from last 7 days   Lab Units 01/22/21  1206   NT-PRO BNP pg/mL 148*          Results from last 7 days   Lab Units 01/25/21  1023 01/24/21  2137   POC GLUCOSE mg/dl 96 77     Results from last 7 days   Lab Units 01/21/21  0618   HEMOGLOBIN A1C % 5 8*         * I Have Reviewed All Lab Data Listed Above  Cultures:   Results from last 7 days   Lab Units 01/20/21  2351   INFLUENZA A PCR  Negative       Results from last 7 days   Lab Units 01/20/21  2351   SARS-COV-2  Negative   INFLUENZA A PCR  Negative   INFLUENZA B PCR  Negative   RSV PCR  Negative           Lines/Drains:  Invasive Devices     Peripheral Intravenous Line            Peripheral IV 01/24/21 Left Antecubital 3 days              Telemetry:      Imaging:  Imaging Reports Reviewed Today Include:   Cta Head And Neck W Wo Contrast    Result Date: 1/21/2021  Impression: Stable subacute ischemia left MCA distribution, embolic in nature based on discontiguous foci of involvement  No nancy hemorrhage, no change in minimal mass effect  No large vessel flow restrictive disease within the head or neck  Slightly diminished distal left MCA branches  Workstation performed: [id number]     Ct Head Without Contrast    Result Date: 1/21/2021  Impression: Subacute left MCA territory infarction, as described above  No evidence of hemorrhagic conversion  Neurology consultation and follow-up is recommended  I personally discussed this study with DR Brenna Bang on 1/21/2021 at 12:59 AM  Workstation performed: [id number]     Ct Spine Cervical Without Contrast    Result Date: 1/21/2021  Impression: No cervical spine fracture or traumatic malalignment  There is mild gentle reversal of cervical lordosis  Mild degenerative changes are present  Workstation performed: [id number]     Ct Chest Abdomen Pelvis W Contrast    Result Date: 1/21/2021  Impression: No evidence of acute intrathoracic, intra-abdominal or intrapelvic parenchymal organ injury  No pneumothorax   There is a moderate to large hiatal hernia  Bilateral gynecomastia  I personally discussed this study with DR Brenna Bang on 1/21/2021 at 12:59 AM  Workstation performed: [id number]      Right Upper Quadrant    Result Date: 1/22/2021  Impression: Evidence of hepatic steatosis with sparing adjacent to the portal vein  No gallstones  Normal ducts  Workstation performed: [id number]     Scheduled Meds:  Current Facility-Administered Medications   Medication Dose Route Frequency Provider Last Rate    acetaminophen  650 mg Oral Q4H PRN Jessy Quiet, CRNP      aluminum-magnesium hydroxide-simethicone  30 mL Oral Q6H PRN Jessy Quiet, CRNP      amLODIPine  5 mg Oral Daily Jordy Massey, DO      atorvastatin  40 mg Oral QPM Jessy Quiet, CRNP      benzonatate  200 mg Oral TID Ellyn Robin, DO      enoxaparin  1 mg/kg Subcutaneous Q12H [address] Ellyn Robin, DO      folic acid  1 mg Oral Daily Jessy Quiet, CRNP      hydrOXYzine HCL  50 mg Oral TID PRN Jessy Quiet, CRNP      Labetalol HCl  10 mg Intravenous Q6H PRN [facility] [place], DO      lisinopril  20 mg Oral Daily [facility] [place], DO      metoclopramide  10 mg Intravenous Q6H PRN Jessy Quiet, CRNP      mirtazapine  30 mg Oral HS Jessy Quiet, CRNP      multivitamin-minerals  1 tablet Oral Daily Jessy Quiet, [address]      nitroglycerin  0 4 mg Sublingual Q5 Min PRN Jessy Quiet, CRNP      oxyCODONE  5 mg Oral Q6H PRN Jessy Quiet, CRNP      senna  2 tablet Oral Daily PRN Jesys Quiet, CRNP      thiamine  100 mg Oral Daily Jessy Quiet, CRNP      warfarin  7 5 mg Oral Daily (warfarin) Ellyn Robin, DO Ellyn Robin, DO  [facility] Internal Medicine  Hospitalist    ** Please Note: This note has been constructed using a voice recognition system   **

## 2021-01-28 NOTE — OCCUPATIONAL THERAPY NOTE
Occupational Therapy Progress Note     Patient Name: Faby Galeano  Today's Date: 1/28/2021  Problem List  Principal Problem:    Acute CVA (cerebrovascular accident) [facility])  Active Problems:    MDD (major depressive disorder), recurrent episode, moderate ([facility] [address] )    Elevated troponin    Alcohol abuse    Drug abuse ([facility] [address] )    Transaminitis    Microcytic anemia    Elevated serum creatinine    Hypertensive urgency    Rhabdomyolysis    Super obese              01/28/21 0856   OT Last Visit   OT Visit Date 01/28/21   Note Type   Note Type Treatment   Restrictions/Precautions   Weight Bearing Precautions Per Order No   Pain Assessment   Pain Assessment Tool Pain Assessment not indicated - pt denies pain   Pain Score No Pain   ADL   Where Assessed Standing at sink   Eating Assistance 7  Independent   Grooming Assistance 6  Modified Independent   Grooming Deficit Increased time to complete   Grooming Comments Pt noted with good fine motor coordination with opening toothpaste and handling toothbrush with R hand   UB Bathing Assistance 6  Modified Independent   UB Bathing Deficit Increased time to complete   LB Bathing Assistance 6  Modified Independent   LB Bathing Deficit Increased time to complete   UB Dressing Assistance 5  Supervision/Setup   UB Dressing Deficit Increased time to complete;Supervision/safety;Verbal cueing   UB Dressing Comments VC for encouragement to complete (I)   LB Dressing Assistance 5  Supervision/Setup   LB Dressing Deficit Increased time to complete   LB Dressing Comments Pt requiring VC for encouragement to complete (I), pt requesting A with B socks, with VC pt able to complete without physical assistance   Functional Standing Tolerance   Time 10 min   Activity standing at sink   Comments steady   Bed Mobility   Additional Comments sitting EOB upon arrival   Transfers   Sit to Stand 7  Independent   Stand to Sit 7  Independent   Stand pivot 7  Independent   Functional Mobility   Functional Mobility 5 Supervision   Additional Comments bed >< bathroom, no use of AD   Coordination   Fine Motor pt reports decreased ROM/FM, pt edu on continuation of FM exercises   Cognition   Overall Cognitive Status Impaired   Arousal/Participation Alert; Cooperative   Attention Attends with cues to redirect   Orientation Level Oriented X4  (Pt initially reporting wrong date, later reporting correct)   Memory Decreased short term memory;Decreased recall of recent events   Following Commands Follows one step commands with increased time or repetition   Comments Pt reporting memory deficits in regards to remembering phone numbers and passwords to bank accounts   Activity Tolerance   Activity Tolerance Patient tolerated treatment well   Assessment   Assessment Pt agreeable to participate in skilled OT treatment session to address ADL retraining, functional transfer training, endurance training, patient/family training, equipment evaluation/education, compensatory technique education, activity engagement and activity tolerance  Pt seated EOB upon arrival  Pt stating "My hand just doesn't want to work, and my brain is another story, I can't remember Sh!#"  Pt agreeable to participate in ADL tasks  Pt completing functional transfers with mod I and functional mobility with (S) to bathroom, standing at the sink pt completing bathing, grooming and dressing tasks  Bathing with mod I, dressing with distant (S) and VC throughout for encouragement for (I) with completion  Pt noted to have no difficulties with handling toothbrush and toothpaste with R hand, despite pt reports of not being able to use hand  Pt reporting deficits with his memory, noted to tell therapist incorrect date, then when story-telling about his missed rent payment, pt able to tell therapist today's date correctly  Pt reports continued deficits with ability to remember banking passwords   Patient continues to be performing below their functional baseline with an increased risk for falls and injury and decreased occupational performance, and would benefit from continued skilled OT treatment to address deficits  The patient's raw score on the AM-PAC Daily Activity inpatient short form is 24, standardized score is 57 54 greater than 39 4  Patients at this level are likely to benefit from DC to home   Pt has made improvements, would benefit from OP OT services including cognitive therapies   Plan   Goal Expiration Date 02/04/21   OT Treatment Day 1   OT Frequency 3-5x/wk   Recommendation   OT Discharge Recommendation Home with skilled therapy  (OP OT, including cognitive therapies)   AM-PAC Daily Activity Inpatient   Lower Body Dressing 4   Bathing 4   Toileting 4   Upper Body Dressing 4   Grooming 4   Eating 4   Daily Activity Raw Score 24   Daily Activity Standardized Score (Calc for Raw Score >=11) 57 54   AM-PAC Applied Cognition Inpatient   Following a Speech/Presentation 3   Understanding Ordinary Conversation 4   Taking Medications 3   Remembering Where Things Are Placed or Put Away 3   Remembering List of 4-5 Errands 3   Taking Care of Complicated Tasks 3   Applied Cognition Raw Score 19   Applied Cognition Standardized Score 39 77         At the end of the session, all needs met and pt seated EOB and call bell within reach    [facility], OTR/L

## 2021-01-28 NOTE — SOCIAL WORK
Met with pt today  Pt stated he was concerned about his dog and now has paid someone to feed his dog  Discuss wit pt about getting all his supporting documents in for his [place] application  Informed pt he would not qualify for Acute and with no insurance he would not be able to go to SNF  [facility] or he could go to  OP Therapy  Gave pt the OP Therapy list   Pt would like to try home PT/OT and referral made to [facility]  Will f/u to check if they will accept case

## 2021-01-28 NOTE — ASSESSMENT & PLAN NOTE
· Transaminitis secondary to hepatic steatosis alcohol use and rhabdomyolysis  · Was initially seen by GI    Results from last 7 days   Lab Units 01/27/21  0519 01/26/21  0516 01/25/21  0508 01/24/21  0240 01/23/21  0610 01/22/21  1206   AST U/L 41 33 56* 108* 161* 266*   ALT U/L 369* 442* 695* 1,042* 1,320* 1,834*   TOTAL BILIRUBIN mg/dL 0 76 1 00 0 66 0 57 0 56 0 83

## 2021-01-28 NOTE — PROGRESS NOTES
Progress Note - Ruchi Gomez 1980, 41 y o  male MRN: [id number]    Unit/Bed#: E4 -01 Encounter: [id number]    Primary Care Provider: No primary care provider on file  Date and time admitted to hospital: 1/20/2021 10:49 PM        * Acute CVA (cerebrovascular accident) [facility])  Assessment & Plan  · Acute left MCA CVA unable to fit into MRI  · Seen by neurology  Transesophageal echocardiogram with bubble study demonstrates PFO  · Was initially started on aspirin and clopidogrel  Lower extremity duplex positive for DVT  · Case discussed with neurology and cardiology  Discontinued aspirin/clopidogrel and started bridging enoxaparin to warfarin    Results from last 7 days   Lab Units 01/28/21  0502 01/27/21  0519 01/26/21  1722 01/22/21  1451   INR  1 24* 1 16 1 16 1 25*       Super obese  Assessment & Plan  · Body mass index is 48 18 kg/m²      Rhabdomyolysis  Assessment & Plan  · Nontraumatic rhabdomyolysis secondary to being found down  · Resolved with IV fluids    Results from last 7 days   Lab Units 01/27/21  0519 01/23/21  0610   CK TOTAL U/L 258 1,053*       Hypertensive urgency  Assessment & Plan  · Hypertensive urgency started on amlodipine and lisinopril now stable    Transaminitis  Assessment & Plan  · Transaminitis secondary to hepatic steatosis alcohol use and rhabdomyolysis  · Was initially seen by GI    Results from last 7 days   Lab Units 01/27/21  0519 01/26/21  0516 01/25/21  0508 01/24/21  0240 01/23/21  0610 01/22/21  1206   AST U/L 41 33 56* 108* 161* 266*   ALT U/L 369* 442* 695* 1,042* 1,320* 1,834*   TOTAL BILIRUBIN mg/dL 0 76 1 00 0 66 0 57 0 56 0 83       Drug abuse (HCC)  Assessment & Plan  · Alcohol, anabolic steroids, and percocet use  · Advised cessation    Elevated troponin  Assessment & Plan  · Non MI elevation troponin secondary to rhabdomyolysis  · Seen by cardiology    Results from last 7 days   Lab Units 01/21/21  0618 01/21/21  0238 01/20/21  2332   TROPONIN I ng/mL 7 76* 10 00* 9 33*       VTE Pharmacologic Prophylaxis: VTE Score: 10 High Risk (Score >/= 5) - Pharmacological DVT Prophylaxis Ordered: Enoxaparin (Lovenox)  Sequential Compression Devices Ordered  Patient Centered Rounds: I have performed bedside rounds with nursing staff today  Discussions with Specialists or Other Care Team Provider:  Case management    Education and Discussions with Family / Patient:     Time Spent for Care: 25 mins  More than 50% of total time spent on counseling and coordination of care as described above  Current Length of Stay: 7 day(s)  Current Patient Status: Inpatient   Certification Statement: The patient will continue to require additional inpatient hospital stay due to  Warfarin bridging  Discharge Plan / Estimated Discharge Date: 24-48Hours    Code Status: Level 1 - Full Code      Subjective:   Patient seen and examined  No new complaints, still clumsiness in his hands    Objective:   Vitals: Blood pressure 149/70, pulse 79, temperature 97 9 °F (36 6 °C), temperature source Tympanic, resp  rate 19, height 5' 9" (1 753 m), weight (!) 148 kg (326 lb 4 5 oz), SpO2 96 %  Physical Exam  Vitals signs reviewed  Constitutional:       General: He is not in acute distress  Cardiovascular:      Rate and Rhythm: Regular rhythm  Heart sounds: Normal heart sounds  Pulmonary:      Effort: Pulmonary effort is normal       Breath sounds: No wheezing  Abdominal:      General: Bowel sounds are normal       Palpations: Abdomen is soft  Tenderness: There is no abdominal tenderness  There is no rebound  Musculoskeletal:         General: No swelling or tenderness  Skin:     General: Skin is warm and dry  Neurological:      Mental Status: He is alert and oriented to person, place, and time  Cranial Nerves: No cranial nerve deficit     Psychiatric:         Mood and Affect: Mood normal        Additional Data:   Labs:  Results from last 7 days   Lab Units 01/28/21  6240 01/27/21  0519 01/26/21  1722 01/26/21  0516 01/25/21  0508 01/24/21  0240 01/23/21  0844 01/22/21  1451   WBC Thousand/uL  --   --   --  16 56* 9 26 8 42 6 92  --    HEMOGLOBIN g/dL  --   --   --  10 6* 10 9* 11 0* 10 3*  --    HEMATOCRIT %  --   --   --  37 3 40 1 40 0 37 3  --    MCV fL  --   --   --  71* 72* 70* 70*  --    PLATELETS Thousands/uL  --   --   --  302 318 290 272  --    INR  1 24* 1 16 1 16  --   --   --   --  1 25*     Results from last 7 days   Lab Units 01/27/21  0519 01/26/21  0516 01/25/21  0508 01/24/21  0240 01/23/21  0610 01/22/21  1206   SODIUM mmol/L 138 140 142 140 139 139   POTASSIUM mmol/L 3 8 4 0 4 2 3 8 4 1 3 8   CHLORIDE mmol/L 104 104 105 104 105 106   CO2 mmol/L 26 29 28 30 28 27   ANION GAP mmol/L 8 7 9 6 6 6   BUN mg/dL 18 22 15 14 11 12   CREATININE mg/dL 1 13 1 28 1 17 1 25 1 13 1 01   CALCIUM mg/dL 9 1 8 4 9 2 9 1 8 5 8 6   ALBUMIN g/dL 3 2* 3 0* 3 1* 3 2* 3 0* 3 0*   TOTAL BILIRUBIN mg/dL 0 76 1 00 0 66 0 57 0 56 0 83   ALK PHOS U/L 65 63 63 67 62 63   ALT U/L 369* 442* 695* 1,042* 1,320* 1,834*   AST U/L 41 33 56* 108* 161* 266*   EGFR ml/min/1 73sq m 80 69 76 71 80 91   GLUCOSE RANDOM mg/dL 94 95 97 88 90 111     Results from last 7 days   Lab Units 01/22/21  1206   MAGNESIUM mg/dL 1 8     Results from last 7 days   Lab Units 01/27/21  0519 01/23/21  0610   CK TOTAL U/L 258 1,053*   CK MB INDEX % <1 0 <1 0     Results from last 7 days   Lab Units 01/22/21  1206   NT-PRO BNP pg/mL 148*          Results from last 7 days   Lab Units 01/25/21  1023 01/24/21  2137   POC GLUCOSE mg/dl 96 77             * I Have Reviewed All Lab Data Listed Above      Cultures:                   Lines/Drains:  Invasive Devices     Peripheral Intravenous Line            Peripheral IV 01/28/21 Distal;Right;Ventral (anterior) Forearm less than 1 day              Telemetry:      Imaging:  Imaging Reports Reviewed Today Include:   Cta Head And Neck W Wo Contrast    Result Date: 1/21/2021  Impression: Stable subacute ischemia left MCA distribution, embolic in nature based on discontiguous foci of involvement  No nancy hemorrhage, no change in minimal mass effect  No large vessel flow restrictive disease within the head or neck  Slightly diminished distal left MCA branches  Workstation performed: [id number]     Ct Head Without Contrast    Result Date: 1/21/2021  Impression: Subacute left MCA territory infarction, as described above  No evidence of hemorrhagic conversion  Neurology consultation and follow-up is recommended  I personally discussed this study with DR Brenna Bang on 1/21/2021 at 12:59 AM  Workstation performed: [id number]     Ct Spine Cervical Without Contrast    Result Date: 1/21/2021  Impression: No cervical spine fracture or traumatic malalignment  There is mild gentle reversal of cervical lordosis  Mild degenerative changes are present  Workstation performed: [id number]     Ct Chest Abdomen Pelvis W Contrast    Result Date: 1/21/2021  Impression: No evidence of acute intrathoracic, intra-abdominal or intrapelvic parenchymal organ injury  No pneumothorax  There is a moderate to large hiatal hernia  Bilateral gynecomastia  I personally discussed this study with DR Brenna Bang on 1/21/2021 at 12:59 AM  Workstation performed: [id number]     Us Right Upper Quadrant    Result Date: 1/22/2021  Impression: Evidence of hepatic steatosis with sparing adjacent to the portal vein  No gallstones  Normal ducts   Workstation performed: [id number]     Scheduled Meds:  Current Facility-Administered Medications   Medication Dose Route Frequency Provider Last Rate    acetaminophen  650 mg Oral Q4H PRN CHELSI Shin      aluminum-magnesium hydroxide-simethicone  30 mL Oral Q6H PRN CHELSI Shin      amLODIPine  5 mg Oral Daily Rashel Oseguera,       atorvastatin  40 mg Oral QPM CHELSI Shin      benzonatate  200 mg Oral TID Alek Escalante, DO      enoxaparin  1 mg/kg Subcutaneous Q12H [address] Jacob Roth, DO      folic acid  1 mg Oral Daily Tara Mitts, CRNP      hydrOXYzine HCL  50 mg Oral TID PRN Tara Mitts, CRNP      Labetalol HCl  10 mg Intravenous Q6H PRN Abril Shingles, DO      lisinopril  20 mg Oral Daily Abril Shingles, DO      metoclopramide  10 mg Intravenous Q6H PRN Tara Mitts, CRNP      mirtazapine  30 mg Oral HS Tara Mitts, CRNP      multivitamin-minerals  1 tablet Oral Daily Tara Mitts, [address]      nitroglycerin  0 4 mg Sublingual Q5 Min PRN Tara Mitts, CRNP      oxyCODONE  5 mg Oral Q6H PRN Tara Mitts, CRNP      senna  2 tablet Oral Daily PRN Tara Mitts, CRNP      thiamine  100 mg Oral Daily Tara Mitts, CRNP      warfarin  7 5 mg Oral Daily (warfarin) Clover Pina, DO Clover Pina, DO [facility]  Hospitalist    ** Please Note: This note has been constructed using a voice recognition system   **

## 2021-01-28 NOTE — ASSESSMENT & PLAN NOTE
· Acute left MCA CVA unable to fit into MRI  · Seen by neurology  Transesophageal echocardiogram with bubble study demonstrates PFO  · Was initially started on aspirin and clopidogrel  Lower extremity duplex positive for DVT  · Case discussed with neurology and cardiology    Discontinued aspirin/clopidogrel and started bridging enoxaparin to warfarin    Results from last 7 days   Lab Units 01/28/21  0502 01/27/21  0519 01/26/21  1722 01/22/21  1451   INR  1 24* 1 16 1 16 1 25*

## 2021-01-28 NOTE — ASSESSMENT & PLAN NOTE
· Nontraumatic rhabdomyolysis secondary to being found down  · Resolved with IV fluids    Results from last 7 days   Lab Units 01/27/21  0519 01/23/21  0610   CK TOTAL U/L 258 1,053*

## 2021-01-28 NOTE — ASSESSMENT & PLAN NOTE
· Transaminitis secondary to hepatic steatosis alcohol use and rhabdomyolysis  · GI following    Results from last 7 days   Lab Units 01/27/21  0519 01/26/21  0516 01/25/21  0508 01/24/21  0240 01/23/21  0610 01/22/21  1206 01/21/21  0618 01/20/21  2332   AST U/L 41 33 56* 108* 161* 266* 743* 1,099*   ALT U/L 369* 442* 695* 1,042* 1,320* 1,834* 2,552* 3,144*   TOTAL BILIRUBIN mg/dL 0 76 1 00 0 66 0 57 0 56 0 83 0 94 1 01*

## 2021-01-28 NOTE — ASSESSMENT & PLAN NOTE
· Nontraumatic rhabdomyolysis secondary to being found down  · Resolved with IV fluids    Results from last 7 days   Lab Units 01/27/21  0519 01/23/21  0610 01/21/21  0810 01/20/21  2332   CK TOTAL U/L 258 1,053* 5,776* 17,316*

## 2021-01-29 VITALS
HEIGHT: 69 IN | WEIGHT: 315 LBS | DIASTOLIC BLOOD PRESSURE: 60 MMHG | BODY MASS INDEX: 46.65 KG/M2 | HEART RATE: 75 BPM | OXYGEN SATURATION: 98 % | TEMPERATURE: 97 F | SYSTOLIC BLOOD PRESSURE: 132 MMHG | RESPIRATION RATE: 18 BRPM

## 2021-01-29 LAB
ALBUMIN SERPL BCP-MCNC: 2.8 G/DL (ref 3.5–5)
ALP SERPL-CCNC: 60 U/L (ref 46–116)
ALT SERPL W P-5'-P-CCNC: 204 U/L (ref 12–78)
ANION GAP SERPL CALCULATED.3IONS-SCNC: 7 MMOL/L (ref 4–13)
AST SERPL W P-5'-P-CCNC: 50 U/L (ref 5–45)
BILIRUB SERPL-MCNC: 0.59 MG/DL (ref 0.2–1)
BUN SERPL-MCNC: 17 MG/DL (ref 5–25)
CALCIUM ALBUM COR SERPL-MCNC: 9.9 MG/DL (ref 8.3–10.1)
CALCIUM SERPL-MCNC: 8.9 MG/DL (ref 8.3–10.1)
CHLORIDE SERPL-SCNC: 102 MMOL/L (ref 100–108)
CO2 SERPL-SCNC: 27 MMOL/L (ref 21–32)
CREAT SERPL-MCNC: 0.98 MG/DL (ref 0.6–1.3)
ERYTHROCYTE [DISTWIDTH] IN BLOOD BY AUTOMATED COUNT: 21.8 % (ref 11.6–15.1)
GFR SERPL CREATININE-BSD FRML MDRD: 95 ML/MIN/1.73SQ M
GLUCOSE SERPL-MCNC: 75 MG/DL (ref 65–140)
HCT VFR BLD AUTO: 37.6 % (ref 36.5–49.3)
HGB BLD-MCNC: 10.4 G/DL (ref 12–17)
INR PPP: 1.17 (ref 0.84–1.19)
MCH RBC QN AUTO: 20.1 PG (ref 26.8–34.3)
MCHC RBC AUTO-ENTMCNC: 27.7 G/DL (ref 31.4–37.4)
MCV RBC AUTO: 73 FL (ref 82–98)
PLATELET # BLD AUTO: 348 THOUSANDS/UL (ref 149–390)
PMV BLD AUTO: 10.8 FL (ref 8.9–12.7)
POTASSIUM SERPL-SCNC: 4.4 MMOL/L (ref 3.5–5.3)
PROT SERPL-MCNC: 7.4 G/DL (ref 6.4–8.2)
PROTHROMBIN TIME: 14.6 SECONDS (ref 11.6–14.5)
RBC # BLD AUTO: 5.17 MILLION/UL (ref 3.88–5.62)
SODIUM SERPL-SCNC: 136 MMOL/L (ref 136–145)
WBC # BLD AUTO: 7.26 THOUSAND/UL (ref 4.31–10.16)

## 2021-01-29 PROCEDURE — 99239 HOSP IP/OBS DSCHRG MGMT >30: CPT | Performed by: INTERNAL MEDICINE

## 2021-01-29 PROCEDURE — 92507 TX SP LANG VOICE COMM INDIV: CPT

## 2021-01-29 PROCEDURE — 80053 COMPREHEN METABOLIC PANEL: CPT | Performed by: INTERNAL MEDICINE

## 2021-01-29 PROCEDURE — 85027 COMPLETE CBC AUTOMATED: CPT | Performed by: INTERNAL MEDICINE

## 2021-01-29 PROCEDURE — 85610 PROTHROMBIN TIME: CPT | Performed by: INTERNAL MEDICINE

## 2021-01-29 RX ORDER — ATORVASTATIN CALCIUM 40 MG/1
40 TABLET, FILM COATED ORAL EVERY EVENING
Qty: 30 TABLET | Refills: 2 | Status: SHIPPED | OUTPATIENT
Start: 2021-01-29

## 2021-01-29 RX ORDER — LISINOPRIL AND HYDROCHLOROTHIAZIDE 20; 12.5 MG/1; MG/1
1 TABLET ORAL DAILY
Qty: 30 TABLET | Refills: 2 | Status: SHIPPED | OUTPATIENT
Start: 2021-01-29

## 2021-01-29 RX ADMIN — AMLODIPINE BESYLATE 5 MG: 5 TABLET ORAL at 09:11

## 2021-01-29 RX ADMIN — BENZONATATE 200 MG: 100 CAPSULE ORAL at 09:11

## 2021-01-29 RX ADMIN — FOLIC ACID 1 MG: 1 TABLET ORAL at 09:11

## 2021-01-29 RX ADMIN — Medication 1 TABLET: at 09:11

## 2021-01-29 RX ADMIN — LISINOPRIL 20 MG: 20 TABLET ORAL at 09:11

## 2021-01-29 RX ADMIN — THIAMINE HCL TAB 100 MG 100 MG: 100 TAB at 09:11

## 2021-01-29 RX ADMIN — ENOXAPARIN SODIUM 150 MG: 150 INJECTION SUBCUTANEOUS at 09:12

## 2021-01-29 NOTE — DISCHARGE SUMMARY
Discharge- Francisco Blake 1980, 41 y o  male MRN: [id number]    Unit/Bed#: E4 -01 Encounter: [id number]    Primary Care Provider: No primary care provider on file  Date and time admitted to hospital: 1/20/2021 10:49 PM        Admitting Provider:  Chas Enamorado DO  Discharge Provider:  Candido Marquez DO  Admission Date: 1/20/2021       Discharge Date: 01/29/21   LOS: 8  Primary Care Physician at Discharge: No primary care provider on file  None    HOSPITAL COURSE:  Francisco Blake is a 41 y o  male without any significant past medical history who presented to the hospital confused with stroke-like symptoms  Reportedly he was laying on the floor for three days and cannot explain why   EMS was summoned where the patient was brought here found to have left MCA territory stroke  Unfortunately calf pain to MRI  He was started on aspirin and atorvastatin by Neurology  Eventually underwent ANA with confirmation of PFO  He underwent lower extremity duplex and was found to have positive thrombus  He initially was started on Lovenox/warfarin bridging due to lack of medical coverage however due to slow moving INR the patient wished to transition to Eliquis which will be provided one month free but afterwards will have significant co-pay unless is medical assistance/coverage is instated  Understands to establish care with a local PCP and has been provided information for [facility] [employer]  Attempt to call sister on day of discharge was unsuccessful, did leave detailed voicemail  Please see problem list listed below  DISCHARGE DIAGNOSES  * Acute CVA (cerebrovascular accident) [facility])  Assessment & Plan  · Acute left MCA CVA unable to fit into MRI  · Seen by neurology  Transesophageal echocardiogram with bubble study demonstrates PFO  · Was initially started on aspirin and clopidogrel  Lower extremity duplex positive for DVT  · Case discussed with neurology and cardiology    Discontinued aspirin/clopidogrel and started bridging enoxaparin to warfarin  INR was slow to move despite up to 10 mg  Patient elected for Eliquis and will be provided one month supply for free  He is advised cash price of this medication $400+ without medical coverage  He understands to establish care with local PCP to transition from eliquis to warfarin if medical assistance is not obtained within this month  · Going further without anticoagulation will result in pulmonary embolism/stroke from VTE  · He is to follow-up with neurology and cardiology as outpatient    Results from last 7 days   Lab Units 01/29/21  0503 01/28/21  0502 01/27/21  0519 01/26/21  1722   INR  1 17 1 24* 1 16 1 16       Super obese  Assessment & Plan  · Body mass index is 48 18 kg/m²  Rhabdomyolysis  Assessment & Plan  · Nontraumatic rhabdomyolysis secondary to being found down  · Resolved with IV fluids    Results from last 7 days   Lab Units 01/27/21  0519 01/23/21  0610   CK TOTAL U/L 258 1,053*       Hypertensive urgency  Assessment & Plan  · Hypertensive urgency started on amlodipine and lisinopril now stable  · Will be discharged with lisinopril/HCT 20/12 5    Transaminitis  Assessment & Plan  · Transaminitis secondary to hepatic steatosis alcohol use and rhabdomyolysis  · Was initially seen by GI    Results from last 7 days   Lab Units 01/29/21  0459 01/27/21  0519 01/26/21  0516 01/25/21  0508 01/24/21  0240 01/23/21  0610   AST U/L 50* 41 33 56* 108* 161*   ALT U/L 204* 369* 442* 695* 1,042* 1,320*   TOTAL BILIRUBIN mg/dL 0 59 0 76 1 00 0 66 0 57 0 56       Drug abuse (HCC)  Assessment & Plan  · Alcohol, anabolic steroids, and percocet use  · Advised cessation    Alcohol abuse  Assessment & Plan  · Alcohol use without active withdrawal symptoms    · Continue thiamine and folic acid    Elevated troponin  Assessment & Plan  · Non MI elevation troponin secondary to rhabdomyolysis  · Seen by cardiology    Results from last 7 days   Lab Units 01/21/21  0618 01/21/21  0238 01/20/21  2332   TROPONIN I ng/mL 7 76* 10 00* 9 33*     CONSULTING PROVIDERS   IP CONSULT TO NEUROLOGY  IP CONSULT TO CASE MANAGEMENT  IP CONSULT TO NUTRITION SERVICES  IP CONSULT TO GASTROENTEROLOGY  IP CONSULT TO CARDIOLOGY  IP CONSULT TO PHYSICAL MEDICINE REHAB    PROCEDURES PERFORMED  Echocardiogram  Date:  1/21/2021  SUMMARY  LEFT VENTRICLE:  Systolic function was normal  Ejection fraction was estimated to be 60 %  There were no regional wall motion abnormalities  Features were consistent with a pseudonormal left ventricular filling pattern, with concomitant abnormal relaxation and increased filling pressure (grade 2 diastolic dysfunction)  LEFT ATRIUM:  The atrium was mildly dilated  RIGHT ATRIUM:  The atrium was mildly dilated  IVC, HEPATIC VEINS:  The inferior vena cava was dilated  Respirophasic changes were blunted (less than 50% variation)  PULMONARY ARTERIES:  Systolic pressure was moderately increased  Estimated peak pressure was in the range of 50 mmHg to 55 mmHg  ANA  Date: 1/25/21  SUMMARY:  1  This is a technically adequate study  2  Left ventricle is normal in size and function  Left ventricular ejection fraction is estimated at 55%  3  Biatrial dilatation is noted  Prominent Chiari network noted within the right atrium  4  Left atrial appendage appears grossly normal without evidence of thrombus or spontaneous echo contrast  5  Moderate PFO is noted by agitated saline injection with right-to-left shunting  Interatrial septum appears aneurysmal   6  Trace mitral regurgitation with fibrocalcific changes of the mitral valve  7  Trace to mild tricuspid regurgitation  8  Aortic root is mildly dilated at 3 6 cm  RADIOLOGY RESULTS  Cta Head And Neck W Wo Contrast  Result Date: 1/21/2021  Impression: Stable subacute ischemia left MCA distribution, embolic in nature based on discontiguous foci of involvement  No nancy hemorrhage, no change in minimal mass effect   No large vessel flow restrictive disease within the head or neck  Slightly diminished distal left MCA branches  Workstation performed: [id number]     Ct Head Without Contrast  Result Date: 1/21/2021  Impression: Subacute left MCA territory infarction, as described above  No evidence of hemorrhagic conversion  Neurology consultation and follow-up is recommended  I personally discussed this study with DR Brenna Bang on 1/21/2021 at 12:59 AM  Workstation performed: [id number]     Ct Spine Cervical Without Contrast  Result Date: 1/21/2021  Impression: No cervical spine fracture or traumatic malalignment  There is mild gentle reversal of cervical lordosis  Mild degenerative changes are present  Workstation performed: [id number]     Ct Chest Abdomen Pelvis W Contrast  Result Date: 1/21/2021  Impression: No evidence of acute intrathoracic, intra-abdominal or intrapelvic parenchymal organ injury  No pneumothorax  There is a moderate to large hiatal hernia  Bilateral gynecomastia  I personally discussed this study with DR Brenna Bang on 1/21/2021 at 12:59 AM  Workstation performed: [id number]     Us Right Upper Quadrant  Result Date: 1/22/2021  Impression: Evidence of hepatic steatosis with sparing adjacent to the portal vein  No gallstones  Normal ducts   Workstation performed: [id number]       LABS  Results from last 7 days   Lab Units 01/29/21  0503 01/28/21  0502 01/27/21  0519 01/26/21  1722 01/26/21  0516 01/25/21  0508 01/24/21  0240 01/23/21  0844   WBC Thousand/uL 7 26  --   --   --  16 56* 9 26 8 42 6 92   HEMOGLOBIN g/dL 10 4*  --   --   --  10 6* 10 9* 11 0* 10 3*   HEMATOCRIT % 37 6  --   --   --  37 3 40 1 40 0 37 3   MCV fL 73*  --   --   --  71* 72* 70* 70*   PLATELETS Thousands/uL 348  --   --   --  302 318 290 272   INR  1 17 1 24* 1 16 1 16  --   --   --   --      Results from last 7 days   Lab Units 01/29/21  0459 01/27/21  0519 01/26/21  0516 01/25/21  0508 01/24/21  0240 01/23/21  0610   SODIUM mmol/L 136 138 140 142 140 139   POTASSIUM mmol/L 4 4 3 8 4 0 4 2 3 8 4 1   CHLORIDE mmol/L 102 104 104 105 104 105   CO2 mmol/L 27 26 29 28 30 28   BUN mg/dL 17 18 22 15 14 11   CREATININE mg/dL 0 98 1 13 1 28 1 17 1 25 1 13   CALCIUM mg/dL 8 9 9 1 8 4 9 2 9 1 8 5   ALBUMIN g/dL 2 8* 3 2* 3 0* 3 1* 3 2* 3 0*   TOTAL BILIRUBIN mg/dL 0 59 0 76 1 00 0 66 0 57 0 56   ALK PHOS U/L 60 65 63 63 67 62   ALT U/L 204* 369* 442* 695* 1,042* 1,320*   AST U/L 50* 41 33 56* 108* 161*   EGFR ml/min/1 73sq m 95 80 69 76 71 80   GLUCOSE RANDOM mg/dL 75 94 95 97 88 90     Results from last 7 days   Lab Units 01/27/21  0519 01/23/21  0610   CK TOTAL U/L 258 1,053*   CK MB INDEX % <1 0 <1 0           Results from last 7 days   Lab Units 01/25/21  1023 01/24/21  2137   POC GLUCOSE mg/dl 96 77           DISCHARGE DAY VISIT AND PHYSICAL EXAM:  Subjective:  Patient seen examined  Feeling very good  Ambulating without difficulty  Vitals:   Blood Pressure: 132/60 (01/29/21 1458)  Pulse: 75 (01/29/21 1458)  Temperature: (!) 97 °F (36 1 °C) (01/29/21 1458)  Temp Source: Temporal (01/29/21 1458)  Respirations: 18 (01/29/21 1458)  Height: 5' 9" (175 3 cm) (01/21/21 0618)  Weight - Scale: (!) 148 kg (326 lb 4 5 oz) (01/21/21 0618)  SpO2: 98 % (01/29/21 1458)    Physical Exam  Vitals signs reviewed  Constitutional:       General: He is not in acute distress  Cardiovascular:      Rate and Rhythm: Regular rhythm  Heart sounds: Normal heart sounds  Pulmonary:      Effort: Pulmonary effort is normal       Breath sounds: No wheezing  Abdominal:      General: Bowel sounds are normal       Palpations: Abdomen is soft  Tenderness: There is no abdominal tenderness  There is no rebound  Musculoskeletal:         General: Swelling present  No tenderness  Skin:     General: Skin is warm and dry  Neurological:      Mental Status: He is alert and oriented to person, place, and time  Cranial Nerves: No cranial nerve deficit  Psychiatric:         Mood and Affect: Mood normal        Planned Re-admission:  No  Discharge Disposition: Home with [address] [place] [address]    Test Results Pending at Discharge:  Muscle biopsy  Follow-up with surgery and Rheumatology  Incidental findings:     Medications   · Discharge Medication List: See after visit summary for reconciled discharge medications  Diet restrictions:  Cardiac diet   Activity restrictions: No strenuous activity  Discharge Condition: stable    Outpatient Follow-Up and Discharge Instructions  See after visit summary section titled Discharge Instructions for information provided to patient and family  Code Status: Level 1 - Full Code  Discharge Statement   I spent 35 minutes discharging the patient  This time was spent on the day of discharge  Greater than 50% of total time was spent with the patient and / or family counseling and / or coordination of care  ** Please Note: This note has been constructed using a voice recognition system   **

## 2021-01-29 NOTE — ASSESSMENT & PLAN NOTE
· Hypertensive urgency started on amlodipine and lisinopril now stable  · Will be discharged with lisinopril/HCT 20/12 5

## 2021-01-29 NOTE — PLAN OF CARE
Problem: Neurological Deficit  Goal: Neurological status is stable or improving  Description: Interventions:  - Monitor and assess patient's level of consciousness, motor function, sensory function, and level of assistance needed for ADLs  - Monitor and report changes from baseline  Collaborate with interdisciplinary team to initiate plan and implement interventions as ordered  - Provide and maintain a safe environment  - Consider seizure precautions  - Consider fall precautions  - Consider aspiration precautions  - Consider bleeding precautions  1/29/2021 1534 by Cortez Nicolas RN  Outcome: Adequate for Discharge  1/29/2021 1043 by Cortez Nicolas RN  Outcome: Progressing     Problem: Activity Intolerance/Impaired Mobility  Goal: Mobility/activity is maintained at optimum level for patient  Description: Interventions:  - Assess and monitor patient  barriers to mobility and need for assistive/adaptive devices  - Assess patient's emotional response to limitations  - Collaborate with interdisciplinary team and initiate plans and interventions as ordered  - Encourage independent activity per ability   - Maintain proper body alignment  - Perform active/passive rom as tolerated/ordered  - Plan activities to conserve energy   - Turn patient as appropriate  1/29/2021 1534 by Cortez Nicolas RN  Outcome: Adequate for Discharge  1/29/2021 1043 by Cortez Nicolas RN  Outcome: Progressing     Problem: Communication Impairment  Goal: Ability to express needs and understand communication  Description: Assess patient's communication skills and ability to understand information  Patient will demonstrate use of effective communication techniques, alternative methods of communication and understanding even if not able to speak  - Encourage communication and provide alternate methods of communication as needed    - Collaborate with case management/ for discharge needs   - Include patient/family/caregiver in decisions related to communication  1/29/2021 1534 by Melany Goldstein RN  Outcome: Adequate for Discharge  1/29/2021 1043 by Melany Goldstein RN  Outcome: Progressing     Problem: Potential for Aspiration  Goal: Non-ventilated patient's risk of aspiration is minimized  Description: Assess and monitor vital signs, respiratory status, and labs (WBC)  Monitor for signs of aspiration (tachypnea, cough, rales, wheezing, cyanosis, fever)  - Assess and monitor patient's ability to swallow  - Place patient up in chair to eat if possible  - HOB up at 90 degrees to eat if unable to get patient up into chair   - Supervise patient during oral intake  - Instruct patient/ family to take small bites  - Instruct patient/ family to take small single sips when taking liquids  - Follow patient-specific strategies generated by speech pathologist   1/29/2021 1534 by Melany Goldstein RN  Outcome: Adequate for Discharge  1/29/2021 1043 by Melany Goldstein RN  Outcome: Progressing  Goal: Ventilated patient's risk of aspiration is minimized  Description: Assess and monitor vital signs, respiratory status, airway cuff pressure, and labs (WBC)  Monitor for signs of aspiration (tachypnea, cough, rales, wheezing, cyanosis, fever)  - Elevate head of bed 30 degrees if patient has tube feeding   - Monitor tube feeding  1/29/2021 1534 by Melany Goldstein RN  Outcome: Adequate for Discharge  1/29/2021 1043 by Melany Goldstein RN  Outcome: Progressing     Problem: Nutrition  Goal: Nutrition/Hydration status is improving  Description: Monitor and assess patient's nutrition/hydration status for malnutrition (ex- brittle hair, bruises, dry skin, pale skin and conjunctiva, muscle wasting, smooth red tongue, and disorientation)  Collaborate with interdisciplinary team and initiate plan and interventions as ordered    Monitor patient's weight and dietary intake as ordered or per policy  Utilize nutrition screening tool and intervene per policy  Determine patient's food preferences and provide high-protein, high-caloric foods as appropriate  - Assist patient with eating   - Allow adequate time for meals   - Encourage patient to take dietary supplement as ordered  - Collaborate with clinical nutritionist   - Include patient/family/caregiver in decisions related to nutrition    1/29/2021 1534 by Marylin Dia RN  Outcome: Adequate for Discharge  1/29/2021 1043 by Marylin Dia RN  Outcome: Progressing     Problem: PAIN - ADULT  Goal: Verbalizes/displays adequate comfort level or baseline comfort level  Description: Interventions:  - Encourage patient to monitor pain and request assistance  - Assess pain using appropriate pain scale  - Administer analgesics based on type and severity of pain and evaluate response  - Implement non-pharmacological measures as appropriate and evaluate response  - Consider cultural and social influences on pain and pain management  - Notify physician/advanced practitioner if interventions unsuccessful or patient reports new pain  1/29/2021 1534 by Marylin Dia RN  Outcome: Adequate for Discharge  1/29/2021 1043 by Marylin Dia RN  Outcome: Progressing     Problem: INFECTION - ADULT  Goal: Absence or prevention of progression during hospitalization  Description: INTERVENTIONS:  - Assess and monitor for signs and symptoms of infection  - Monitor lab/diagnostic results  - Monitor all insertion sites, i e  indwelling lines, tubes, and drains  - Monitor endotracheal if appropriate and nasal secretions for changes in amount and color  - [place] appropriate cooling/warming therapies per order  - Administer medications as ordered  - Instruct and encourage patient and family to use good hand hygiene technique  - Identify and instruct in appropriate isolation precautions for identified infection/condition  1/29/2021 1534 by Svitlana Tripp RN  Outcome: Adequate for Discharge  1/29/2021 1043 by Svitlana Tripp RN  Outcome: Progressing     Problem: SAFETY ADULT  Goal: Patient will remain free of falls  Description: INTERVENTIONS:  - Assess patient frequently for physical needs  -  Identify cognitive and physical deficits and behaviors that affect risk of falls    -  [place] fall precautions as indicated by assessment   - Educate patient/family on patient safety including physical limitations  - Instruct patient to call for assistance with activity based on assessment  - Modify environment to reduce risk of injury  - Consider OT/PT consult to assist with strengthening/mobility  1/29/2021 1534 by Svitlana rTipp RN  Outcome: Adequate for Discharge  1/29/2021 1043 by Svitlana Tripp RN  Outcome: Progressing  Goal: Maintain or return to baseline ADL function  Description: INTERVENTIONS:  -  Assess patient's ability to carry out ADLs; assess patient's baseline for ADL function and identify physical deficits which impact ability to perform ADLs (bathing, care of mouth/teeth, toileting, grooming, dressing, etc )  - Assess/evaluate cause of self-care deficits   - Assess range of motion  - Assess patient's mobility; develop plan if impaired  - Assess patient's need for assistive devices and provide as appropriate  - Encourage maximum independence but intervene and supervise when necessary  - Involve family in performance of ADLs  - Assess for home care needs following discharge   - Consider OT consult to assist with ADL evaluation and planning for discharge  - Provide patient education as appropriate  1/29/2021 1534 by Svitlana Tripp RN  Outcome: Adequate for Discharge  1/29/2021 1043 by Svitlana Tripp RN  Outcome: Progressing  Goal: Maintain or return mobility status to optimal level  Description: INTERVENTIONS:  - Assess patient's baseline mobility status (ambulation, transfers, stairs, etc )    - Identify cognitive and physical deficits and behaviors that affect mobility  - Identify mobility aids required to assist with transfers and/or ambulation (gait belt, sit-to-stand, lift, walker, cane, etc )  - [place] fall precautions as indicated by assessment  - Record patient progress and toleration of activity level on Mobility SBAR; progress patient to next Phase/Stage  - Instruct patient to call for assistance with activity based on assessment  - Consider rehabilitation consult to assist with strengthening/weightbearing, etc   1/29/2021 1534 by Flaquita Miranda RN  Outcome: Adequate for Discharge  1/29/2021 1043 by Flaquita Miranda RN  Outcome: Progressing     Problem: DISCHARGE PLANNING  Goal: Discharge to home or other facility with appropriate resources  Description: INTERVENTIONS:  - Identify barriers to discharge w/patient and caregiver  - Arrange for needed discharge resources and transportation as appropriate  - Identify discharge learning needs (meds, wound care, etc )  - Arrange for interpretive services to assist at discharge as needed  - Refer to Case Management Department for coordinating discharge planning if the patient needs post-hospital services based on physician/advanced practitioner order or complex needs related to functional status, cognitive ability, or social support system  1/29/2021 1534 by Flaquita Miranda RN  Outcome: Adequate for Discharge  1/29/2021 1043 by Flaquita Miranda RN  Outcome: Progressing     Problem: Knowledge Deficit  Goal: Patient/family/caregiver demonstrates understanding of disease process, treatment plan, medications, and discharge instructions  Description: Complete learning assessment and assess knowledge base    Interventions:  - Provide teaching at level of understanding  - Provide teaching via preferred learning methods  1/29/2021 1534 by Flaquita Miranda RN  Outcome: Adequate for Discharge  1/29/2021 1043 by Sheila Jackson RN  Outcome: Progressing     Problem: Potential for Falls  Goal: Patient will remain free of falls  Description: INTERVENTIONS:  - Assess patient frequently for physical needs  -  Identify cognitive and physical deficits and behaviors that affect risk of falls    -  [place] fall precautions as indicated by assessment   - Educate patient/family on patient safety including physical limitations  - Instruct patient to call for assistance with activity based on assessment  - Modify environment to reduce risk of injury  - Consider OT/PT consult to assist with strengthening/mobility  1/29/2021 1534 by Sheila Jackson RN  Outcome: Adequate for Discharge  1/29/2021 1043 by Sheila Jackson RN  Outcome: Progressing

## 2021-01-29 NOTE — PLAN OF CARE
Problem: Neurological Deficit  Goal: Neurological status is stable or improving  Description: Interventions:  - Monitor and assess patient's level of consciousness, motor function, sensory function, and level of assistance needed for ADLs  - Monitor and report changes from baseline  Collaborate with interdisciplinary team to initiate plan and implement interventions as ordered  - Provide and maintain a safe environment  - Consider seizure precautions  - Consider fall precautions  - Consider aspiration precautions  - Consider bleeding precautions  Outcome: Progressing     Problem: Activity Intolerance/Impaired Mobility  Goal: Mobility/activity is maintained at optimum level for patient  Description: Interventions:  - Assess and monitor patient  barriers to mobility and need for assistive/adaptive devices  - Assess patient's emotional response to limitations  - Collaborate with interdisciplinary team and initiate plans and interventions as ordered  - Encourage independent activity per ability   - Maintain proper body alignment  - Perform active/passive rom as tolerated/ordered  - Plan activities to conserve energy   - Turn patient as appropriate  Outcome: Progressing     Problem: Communication Impairment  Goal: Ability to express needs and understand communication  Description: Assess patient's communication skills and ability to understand information  Patient will demonstrate use of effective communication techniques, alternative methods of communication and understanding even if not able to speak  - Encourage communication and provide alternate methods of communication as needed  - Collaborate with case management/ for discharge needs  - Include patient/family/caregiver in decisions related to communication    Outcome: Progressing     Problem: Potential for Aspiration  Goal: Non-ventilated patient's risk of aspiration is minimized  Description: Assess and monitor vital signs, respiratory status, and labs (WBC)  Monitor for signs of aspiration (tachypnea, cough, rales, wheezing, cyanosis, fever)  - Assess and monitor patient's ability to swallow  - Place patient up in chair to eat if possible  - HOB up at 90 degrees to eat if unable to get patient up into chair   - Supervise patient during oral intake  - Instruct patient/ family to take small bites  - Instruct patient/ family to take small single sips when taking liquids  - Follow patient-specific strategies generated by speech pathologist   Outcome: Progressing  Goal: Ventilated patient's risk of aspiration is minimized  Description: Assess and monitor vital signs, respiratory status, airway cuff pressure, and labs (WBC)  Monitor for signs of aspiration (tachypnea, cough, rales, wheezing, cyanosis, fever)  - Elevate head of bed 30 degrees if patient has tube feeding   - Monitor tube feeding  Outcome: Progressing     Problem: Nutrition  Goal: Nutrition/Hydration status is improving  Description: Monitor and assess patient's nutrition/hydration status for malnutrition (ex- brittle hair, bruises, dry skin, pale skin and conjunctiva, muscle wasting, smooth red tongue, and disorientation)  Collaborate with interdisciplinary team and initiate plan and interventions as ordered  Monitor patient's weight and dietary intake as ordered or per policy  Utilize nutrition screening tool and intervene per policy  Determine patient's food preferences and provide high-protein, high-caloric foods as appropriate  - Assist patient with eating   - Allow adequate time for meals   - Encourage patient to take dietary supplement as ordered  - Collaborate with clinical nutritionist   - Include patient/family/caregiver in decisions related to nutrition    Outcome: Progressing     Problem: PAIN - ADULT  Goal: Verbalizes/displays adequate comfort level or baseline comfort level  Description: Interventions:  - Encourage patient to monitor pain and request assistance  - Assess pain using appropriate pain scale  - Administer analgesics based on type and severity of pain and evaluate response  - Implement non-pharmacological measures as appropriate and evaluate response  - Consider cultural and social influences on pain and pain management  - Notify physician/advanced practitioner if interventions unsuccessful or patient reports new pain  Outcome: Progressing     Problem: INFECTION - ADULT  Goal: Absence or prevention of progression during hospitalization  Description: INTERVENTIONS:  - Assess and monitor for signs and symptoms of infection  - Monitor lab/diagnostic results  - Monitor all insertion sites, i e  indwelling lines, tubes, and drains  - Monitor endotracheal if appropriate and nasal secretions for changes in amount and color  - [place] appropriate cooling/warming therapies per order  - Administer medications as ordered  - Instruct and encourage patient and family to use good hand hygiene technique  - Identify and instruct in appropriate isolation precautions for identified infection/condition  Outcome: Progressing     Problem: SAFETY ADULT  Goal: Patient will remain free of falls  Description: INTERVENTIONS:  - Assess patient frequently for physical needs  -  Identify cognitive and physical deficits and behaviors that affect risk of falls    -  [place] fall precautions as indicated by assessment   - Educate patient/family on patient safety including physical limitations  - Instruct patient to call for assistance with activity based on assessment  - Modify environment to reduce risk of injury  - Consider OT/PT consult to assist with strengthening/mobility  Outcome: Progressing  Goal: Maintain or return to baseline ADL function  Description: INTERVENTIONS:  -  Assess patient's ability to carry out ADLs; assess patient's baseline for ADL function and identify physical deficits which impact ability to perform ADLs (bathing, care of mouth/teeth, toileting, grooming, dressing, etc )  - Assess/evaluate cause of self-care deficits   - Assess range of motion  - Assess patient's mobility; develop plan if impaired  - Assess patient's need for assistive devices and provide as appropriate  - Encourage maximum independence but intervene and supervise when necessary  - Involve family in performance of ADLs  - Assess for home care needs following discharge   - Consider OT consult to assist with ADL evaluation and planning for discharge  - Provide patient education as appropriate  Outcome: Progressing  Goal: Maintain or return mobility status to optimal level  Description: INTERVENTIONS:  - Assess patient's baseline mobility status (ambulation, transfers, stairs, etc )    - Identify cognitive and physical deficits and behaviors that affect mobility  - Identify mobility aids required to assist with transfers and/or ambulation (gait belt, sit-to-stand, lift, walker, cane, etc )  - [place] fall precautions as indicated by assessment  - Record patient progress and toleration of activity level on Mobility SBAR; progress patient to next Phase/Stage  - Instruct patient to call for assistance with activity based on assessment  - Consider rehabilitation consult to assist with strengthening/weightbearing, etc   Outcome: Progressing     Problem: DISCHARGE PLANNING  Goal: Discharge to home or other facility with appropriate resources  Description: INTERVENTIONS:  - Identify barriers to discharge w/patient and caregiver  - Arrange for needed discharge resources and transportation as appropriate  - Identify discharge learning needs (meds, wound care, etc )  - Arrange for interpretive services to assist at discharge as needed  - Refer to Case Management Department for coordinating discharge planning if the patient needs post-hospital services based on physician/advanced practitioner order or complex needs related to functional status, cognitive ability, or social support system  Outcome: Progressing     Problem: Knowledge Deficit  Goal: Patient/family/caregiver demonstrates understanding of disease process, treatment plan, medications, and discharge instructions  Description: Complete learning assessment and assess knowledge base  Interventions:  - Provide teaching at level of understanding  - Provide teaching via preferred learning methods  Outcome: Progressing     Problem: Potential for Falls  Goal: Patient will remain free of falls  Description: INTERVENTIONS:  - Assess patient frequently for physical needs  -  Identify cognitive and physical deficits and behaviors that affect risk of falls    -  [place] fall precautions as indicated by assessment   - Educate patient/family on patient safety including physical limitations  - Instruct patient to call for assistance with activity based on assessment  - Modify environment to reduce risk of injury  - Consider OT/PT consult to assist with strengthening/mobility  Outcome: Progressing

## 2021-01-29 NOTE — ASSESSMENT & PLAN NOTE
· Transaminitis secondary to hepatic steatosis alcohol use and rhabdomyolysis  · Was initially seen by GI    Results from last 7 days   Lab Units 01/29/21  0459 01/27/21  0519 01/26/21  0516 01/25/21  0508 01/24/21  0240 01/23/21  0610   AST U/L 50* 41 33 56* 108* 161*   ALT U/L 204* 369* 442* 695* 1,042* 1,320*   TOTAL BILIRUBIN mg/dL 0 59 0 76 1 00 0 66 0 57 0 56

## 2021-01-29 NOTE — NURSING NOTE
Pt wanting to sign out AMA stating "I can't keep sitting in the hospital accumulating medical bills and waiting for my coumadin levels to be ok"  Pt also states he has to go to the bank today by 5pm to pay his rent  Risks of signing out AMA explained to patient as well as reason behind awaiting coumadin levels to be therapeutic  Dr Greg Izquierdo with [facility] aware  Will continue to monitor

## 2021-01-29 NOTE — PLAN OF CARE
Problem: Neurological Deficit  Goal: Neurological status is stable or improving  Description: Interventions:  - Monitor and assess patient's level of consciousness, motor function, sensory function, and level of assistance needed for ADLs  - Monitor and report changes from baseline  Collaborate with interdisciplinary team to initiate plan and implement interventions as ordered  - Provide and maintain a safe environment  - Consider seizure precautions  - Consider fall precautions  - Consider aspiration precautions  - Consider bleeding precautions  Outcome: Progressing     Problem: Activity Intolerance/Impaired Mobility  Goal: Mobility/activity is maintained at optimum level for patient  Description: Interventions:  - Assess and monitor patient  barriers to mobility and need for assistive/adaptive devices  - Assess patient's emotional response to limitations  - Collaborate with interdisciplinary team and initiate plans and interventions as ordered  - Encourage independent activity per ability   - Maintain proper body alignment  - Perform active/passive rom as tolerated/ordered  - Plan activities to conserve energy   - Turn patient as appropriate  Outcome: Progressing     Problem: Communication Impairment  Goal: Ability to express needs and understand communication  Description: Assess patient's communication skills and ability to understand information  Patient will demonstrate use of effective communication techniques, alternative methods of communication and understanding even if not able to speak  - Encourage communication and provide alternate methods of communication as needed  - Collaborate with case management/ for discharge needs  - Include patient/family/caregiver in decisions related to communication    Outcome: Progressing     Problem: Potential for Aspiration  Goal: Non-ventilated patient's risk of aspiration is minimized  Description: Assess and monitor vital signs, respiratory status, and labs (WBC)  Monitor for signs of aspiration (tachypnea, cough, rales, wheezing, cyanosis, fever)  - Assess and monitor patient's ability to swallow  - Place patient up in chair to eat if possible  - HOB up at 90 degrees to eat if unable to get patient up into chair   - Supervise patient during oral intake  - Instruct patient/ family to take small bites  - Instruct patient/ family to take small single sips when taking liquids  - Follow patient-specific strategies generated by speech pathologist   Outcome: Progressing  Goal: Ventilated patient's risk of aspiration is minimized  Description: Assess and monitor vital signs, respiratory status, airway cuff pressure, and labs (WBC)  Monitor for signs of aspiration (tachypnea, cough, rales, wheezing, cyanosis, fever)  - Elevate head of bed 30 degrees if patient has tube feeding   - Monitor tube feeding  Outcome: Progressing     Problem: Nutrition  Goal: Nutrition/Hydration status is improving  Description: Monitor and assess patient's nutrition/hydration status for malnutrition (ex- brittle hair, bruises, dry skin, pale skin and conjunctiva, muscle wasting, smooth red tongue, and disorientation)  Collaborate with interdisciplinary team and initiate plan and interventions as ordered  Monitor patient's weight and dietary intake as ordered or per policy  Utilize nutrition screening tool and intervene per policy  Determine patient's food preferences and provide high-protein, high-caloric foods as appropriate  - Assist patient with eating   - Allow adequate time for meals   - Encourage patient to take dietary supplement as ordered  - Collaborate with clinical nutritionist   - Include patient/family/caregiver in decisions related to nutrition    Outcome: Progressing     Problem: PAIN - ADULT  Goal: Verbalizes/displays adequate comfort level or baseline comfort level  Description: Interventions:  - Encourage patient to monitor pain and request assistance  - Assess pain using appropriate pain scale  - Administer analgesics based on type and severity of pain and evaluate response  - Implement non-pharmacological measures as appropriate and evaluate response  - Consider cultural and social influences on pain and pain management  - Notify physician/advanced practitioner if interventions unsuccessful or patient reports new pain  Outcome: Progressing     Problem: INFECTION - ADULT  Goal: Absence or prevention of progression during hospitalization  Description: INTERVENTIONS:  - Assess and monitor for signs and symptoms of infection  - Monitor lab/diagnostic results  - Monitor all insertion sites, i e  indwelling lines, tubes, and drains  - Monitor endotracheal if appropriate and nasal secretions for changes in amount and color  - [place] appropriate cooling/warming therapies per order  - Administer medications as ordered  - Instruct and encourage patient and family to use good hand hygiene technique  - Identify and instruct in appropriate isolation precautions for identified infection/condition  Outcome: Progressing     Problem: SAFETY ADULT  Goal: Patient will remain free of falls  Description: INTERVENTIONS:  - Assess patient frequently for physical needs  -  Identify cognitive and physical deficits and behaviors that affect risk of falls    -  [place] fall precautions as indicated by assessment   - Educate patient/family on patient safety including physical limitations  - Instruct patient to call for assistance with activity based on assessment  - Modify environment to reduce risk of injury  - Consider OT/PT consult to assist with strengthening/mobility  Outcome: Progressing  Goal: Maintain or return to baseline ADL function  Description: INTERVENTIONS:  -  Assess patient's ability to carry out ADLs; assess patient's baseline for ADL function and identify physical deficits which impact ability to perform ADLs (bathing, care of mouth/teeth, toileting, grooming, dressing, etc )  - Assess/evaluate cause of self-care deficits   - Assess range of motion  - Assess patient's mobility; develop plan if impaired  - Assess patient's need for assistive devices and provide as appropriate  - Encourage maximum independence but intervene and supervise when necessary  - Involve family in performance of ADLs  - Assess for home care needs following discharge   - Consider OT consult to assist with ADL evaluation and planning for discharge  - Provide patient education as appropriate  Outcome: Progressing  Goal: Maintain or return mobility status to optimal level  Description: INTERVENTIONS:  - Assess patient's baseline mobility status (ambulation, transfers, stairs, etc )    - Identify cognitive and physical deficits and behaviors that affect mobility  - Identify mobility aids required to assist with transfers and/or ambulation (gait belt, sit-to-stand, lift, walker, cane, etc )  - [place] fall precautions as indicated by assessment  - Record patient progress and toleration of activity level on Mobility SBAR; progress patient to next Phase/Stage  - Instruct patient to call for assistance with activity based on assessment  - Consider rehabilitation consult to assist with strengthening/weightbearing, etc   Outcome: Progressing     Problem: DISCHARGE PLANNING  Goal: Discharge to home or other facility with appropriate resources  Description: INTERVENTIONS:  - Identify barriers to discharge w/patient and caregiver  - Arrange for needed discharge resources and transportation as appropriate  - Identify discharge learning needs (meds, wound care, etc )  - Arrange for interpretive services to assist at discharge as needed  - Refer to Case Management Department for coordinating discharge planning if the patient needs post-hospital services based on physician/advanced practitioner order or complex needs related to functional status, cognitive ability, or social support system  Outcome: Progressing     Problem: Knowledge Deficit  Goal: Patient/family/caregiver demonstrates understanding of disease process, treatment plan, medications, and discharge instructions  Description: Complete learning assessment and assess knowledge base  Interventions:  - Provide teaching at level of understanding  - Provide teaching via preferred learning methods  Outcome: Progressing     Problem: Potential for Falls  Goal: Patient will remain free of falls  Description: INTERVENTIONS:  - Assess patient frequently for physical needs  -  Identify cognitive and physical deficits and behaviors that affect risk of falls    -  [place] fall precautions as indicated by assessment   - Educate patient/family on patient safety including physical limitations  - Instruct patient to call for assistance with activity based on assessment  - Modify environment to reduce risk of injury  - Consider OT/PT consult to assist with strengthening/mobility  Outcome: Progressing

## 2021-01-29 NOTE — SPEECH THERAPY NOTE
Speech Language/Pathology    Speech/Language Pathology Progress Note    Patient Name: Yuriy Brown  Today's Date: 1/29/2021     Problem List  Principal Problem:    Acute CVA (cerebrovascular accident) [facility])  Active Problems:    MDD (major depressive disorder), recurrent episode, moderate (HCC)    Elevated troponin    Alcohol abuse    Drug abuse ([facility] [address] )    Transaminitis    Microcytic anemia    Elevated serum creatinine    Hypertensive urgency    Rhabdomyolysis    Super obese       Past Medical History  Past Medical History:   Diagnosis Date    High blood pressure         Past Surgical History  Past Surgical History:   Procedure Laterality Date    HERNIA REPAIR           Subjective:  Pt seen for cognitive-linguistic tx  Pt sitting upright in chair, pleasant and cooperative  "I can't think straight, I'm really tired"  Objective:  Reviewed chart; pt wants to go home today, threatening to sign out AMA  He will be seen by [facility] when he goes home  Pt reports he continues to have word finding issues in conversation, and is afraid he won't be able to figure out his checkbook or pay his bills at home  Discussed ways to improve word finding during conversation, including producing a mental image, stating the first letter/sound of the word, description of the object/word  Pt was encouraged to use these strategies during two instances of difficulty with word finding during conversation  Pt was not successful with self cueing, but did respond to appropriately to phonemic and descriptive cues from SLP, and he was able to produce the correct word one of two times  Attempted simple written math calculations (addition and subtraction), but pt was too distractible and upset that he was having so much difficulty with the calculations, as well as using his R dominant hand to write, therefore terminated this task  Simple verbal math reasoning tasks were completed with pt  These included simple time and money tasks   Pt completed 2/10 (20%) independently, but needed max cues to reach 90% accuracy during the task (cues included visual, such as the clock, verbal, descriptive cues)  Pt was upset that he was having so much difficulty, and was fully aware of deficits  Assessment:  Severe deficits in simple math/time reasoning skills, which will likely cause difficulty with money, time, medication management at home  Suspect he will need help with these areas at home  Pt does respond well to max cues to improve accuracy  He responds well to cues to improve word finding during conversation, but was not able to self cue  Plan/Recommendations:  Continue with speech tx per plan  Pt would benefit from continued speech tx when discharged

## 2021-01-29 NOTE — SOCIAL WORK
[facility] is able to accept case  MD has discharge pt home today  Pt's meds will cost total $21 34 and pt stated he can afford them  RN is aware pt needs to  meds at [facility]  Pt will also need a LYFT ride and rhett form  Form was put in bin to be scan  TC to  [facility] for  time  They will  at 1630  RN is aware of this

## 2021-01-29 NOTE — ASSESSMENT & PLAN NOTE
· Acute left MCA CVA unable to fit into MRI  · Seen by neurology  Transesophageal echocardiogram with bubble study demonstrates PFO  · Was initially started on aspirin and clopidogrel  Lower extremity duplex positive for DVT  · Case discussed with neurology and cardiology  Discontinued aspirin/clopidogrel and started bridging enoxaparin to warfarin  INR was slow to move despite up to 10 mg  Patient elected for Eliquis and will be provided one month supply for free  He is advised cash price of this medication $400+ without medical coverage  He understands to establish care with local PCP to transition from eliquis to warfarin if medical assistance is not obtained within this month    · Going further without anticoagulation will result in pulmonary embolism/stroke from VTE  · He is to follow-up with neurology and cardiology as outpatient    Results from last 7 days   Lab Units 01/29/21  0503 01/28/21  0502 01/27/21  0519 01/26/21  1722   INR  1 17 1 24* 1 16 1 16

## 2021-02-03 ENCOUNTER — DOCUMENTATION (OUTPATIENT)
Dept: SOCIAL WORK | Facility: HOSPITAL | Age: 41
End: 2021-02-03

## 2021-02-03 NOTE — PROGRESS NOTES
[facility] of [facility]ess not [facility]  [facility] has assessed your patient for Home Health services and has determined the patient is not eligible for service due to the following pt is not homebound  Pt is independent with all ADLs  cooking and cleaning  Pt is independent with mobility and activity without use of personal DME  Pt reports no falls since being home from the hospital     DC from skilled home OT and home PT at this time  No skilled home OT and home PT services needed at this time  Recommend outpt OT to address R hand coordination and strength  Pt is not homebound and does not qualify for home therapy and home health services at this time  Request for Dr Monico Glover to place an outpt OT script in [employer] for pt to use to attend and schedule outpt OT services      Thank dov Ochoa, PT

## 2021-02-04 ENCOUNTER — TELEPHONE (OUTPATIENT)
Dept: NEUROLOGY | Facility: CLINIC | Age: 41
End: 2021-02-04

## 2021-02-04 NOTE — TELEPHONE ENCOUNTER
Post CVA Discharge Follow Up    Hospitalization: 1/20-1/29  The purpose of this phone call is to assess patient's general wellbeing or for any assistance needed with follow-up care  Called patient at [phone]  I introduced myself and explained neurovascular nurse navigator role  Since discharge, he denies experiencing any new or worsening stroke-like symptoms  States he is having difficulty with concentration, states it makes him feel "mentally confused"  Also continues to have decreased motor function of right arm and hand  Relearning to write at this time, compensating with the left hand  States he thinks his legs are numb and stiff (towards the back of legs, bilaterally)  Claims blurred vision has also continued (he cannot confirm if it is in the right eye, left eye, or both), cannot describe the blurriness  States his speech is impaired, reports he does not "have a colorful vocabulary" as he used to  States he feels impeded as his vision is compromised and he is having a hard time seeing his credit cards  He plans to follow up with therapy for this  Ambulation / ADLs:  Claims he is ambulating independently as well as preforming his own ADLs  Patient manages his own medications, appointments, and affairs  States he has someone that is living with him  A woman is there for a couple days out of the week  States she is homeless and does not assist with his care  States she walks his dog  Appointments / Medication Review:  Patient does not have a PCP yet, he plans to find one  He has not scheduled with cardiology, I provided their number  Offered to schedule stroke hospital follow up appointment, he asked me to call him back shortly to schedule  Per patient, he was indeed seen by in-home visiting nurses/aids and PT  States PT cleared him  States he does not know if anyone else is seeing him   I provided him the number for  home healthcare  I reviewed medications with him  There have not been any medication changes since discharge from the hospital  Patient reports having no difficulties obtaining medications  Reports he is taking all as prescribed with no missed doses, medication side effects, or signs of bleeding  Risk Factors / Education:  We reviewed stroke type, symptoms, personal risk factors and management, medications, and resources  Patient verbalizes understanding of teaching  Patient does not check his BP at home  Claims he is a non smoker  States he follows a healthy diet and previously would work out often  Patent expressed emotions about new limitations  No thoughts of wanting to hurt himself or others  ___    Ended the call per patient as he was allowing the woman into the apartment  Called him back, offered to schedule stroke hosptial follow up 3/9, he is agreeable to this  Verified mailing address and sent new patient packet  HE notes he does not have is AVS from the hospital  I printed this out and sent it as well  I addressed all his questions  At the conclusion of the conversation, patient denies having any further questions or concerns

## 2021-02-24 ENCOUNTER — TELEPHONE (OUTPATIENT)
Dept: NEUROLOGY | Facility: CLINIC | Age: 41
End: 2021-02-24

## 2021-02-24 NOTE — TELEPHONE ENCOUNTER
Called patient to offer a sooner appointment with Dr Keely Bey on 3/5, patient is agreeable to this  States he has not seen a PCP yet and he is running low on BP meds  Advised patient find a PCP ASAP  He verbalized understanding  I provided him the [address] [facility] [address] to do so  Patient was recording appointment information and states it is hard for him to write as his right (dominant) hand is "40% functional"  I offered to send [employer] info via email and he is agreeable to this  Verified email and sent activation code  Patient states he is excited to see the provider as he has info to submit for child support  I advised he bring any all supporting info to the appointment to be reviewed  Patient does not have any further questions or concerns at this time

## 2021-03-05 ENCOUNTER — OFFICE VISIT (OUTPATIENT)
Dept: NEUROLOGY | Facility: CLINIC | Age: 41
End: 2021-03-05

## 2021-03-05 VITALS — SYSTOLIC BLOOD PRESSURE: 130 MMHG | HEART RATE: 89 BPM | DIASTOLIC BLOOD PRESSURE: 90 MMHG

## 2021-03-05 DIAGNOSIS — G08 CEREBRAL VENOUS SINUS THROMBOSIS: Primary | ICD-10-CM

## 2021-03-05 PROCEDURE — 99213 OFFICE O/P EST LOW 20 MIN: CPT | Performed by: PSYCHIATRY & NEUROLOGY

## 2021-03-05 NOTE — PROGRESS NOTES
Patient ID: Patrick Smith is a 41 y o  male  Assessment/Plan:       left middle cerebral artery embolic stroke in the setting of deep venous thrombosis of the leg and PFO       the patient is not established a primary care physician or his seen by Cardiology although recommended and we urged him to do this and made referrals  In addition he told me today that he was using anabolic steroids and testosterone compounds for weightlifting prior to his stroke which could be the etiology of his coagulopathy and stroke also  I reviewed lifestyle modifications in depth weight loss aerobic exercise diet discontinuing tobacco and alcohol and absolutely no use of street substances  I have asked for follow-up in 2 months reinforced lifestyle modifications and complete abstinence from any steroid or testosterone her muscle building agents  20 minutes was spent with this patient half of which was spent in coordination of care education and counseling    Subjective:    HPI  Patient presents to the office today following recent hospitalization for stroke  Since discharge, he denies experiencing any new or worsening stroke-like symptoms  Patient reports he continues to have the following symptoms: decreased coordination, decreased balance at times, right arm and leg weakness and numbness  States he cannot differentiate numbers  Patient states he feels neck pain and has "twictches"/tremor of right hand  States he has had 2 falls at home, no head strike  No injuries  Ambulation / ADLs:  Patient claims he is ambulating independently as well as preforming his own ADLs  Patient manages his own medications, appointments, and affairs  Appointments / Medication Review:  Patient has not seen a PCP or cardiology  I informed him to do this as soon as possible  I reviewed medications with him  Patient reports having no difficulties obtaining medications   Reports he is taking all as prescribed with no missed doses, medication side effects, or signs of bleeding  Risk Factors / Education:  We reviewed stroke type, symptoms, personal risk factors and management, medications, and resources  Patient verbalizes understanding of teaching  Offered stroke education binder and my card to patient  he has been managing personal stroke risk factors and reports the following: Reports he has had no use of steroids, illicit drugs, or alcohol since discharge    Not smoking, has some chewing tobacco on occasion  Discussed tobacco cessation  I addressed all his questions  At the conclusion of the conversation, patient denies having any further questions or concerns  Objective: There were no vitals taken for this visit  Physical Exam    Neurological Exam   Of there is still some dysesthesias jose juan across the right side of the body and predominant right lower extremity weakness  Modest hyper-reflexia on the right      ROS:    Review of Systems   Constitutional: Negative  Negative for appetite change and fever  HENT: Negative  Negative for hearing loss, tinnitus, trouble swallowing and voice change  Eyes: Negative  Negative for photophobia and pain  Respiratory: Negative  Negative for shortness of breath  Cardiovascular: Negative  Negative for palpitations  Gastrointestinal: Negative  Negative for nausea and vomiting  Endocrine: Negative  Negative for cold intolerance  Genitourinary: Negative  Negative for dysuria, frequency and urgency  Musculoskeletal: Negative  Negative for myalgias and neck pain  Decreased arm coordination    Skin: Negative  Negative for rash  Neurological: Positive for speech difficulty (reports aphagia (tripping over his words) at times), weakness (slight right arm) and numbness (right leg and right arm)  Negative for dizziness, tremors, seizures, syncope, facial asymmetry, light-headedness and headaches     Hematological: Negative  Does not bruise/bleed easily  Psychiatric/Behavioral: Positive for decreased concentration and sleep disturbance (basline insomnia )  Negative for confusion (mixes up numbers) and hallucinations

## 2021-03-12 ENCOUNTER — TELEPHONE (OUTPATIENT)
Dept: NEUROLOGY | Facility: CLINIC | Age: 41
End: 2021-03-12

## 2021-03-12 NOTE — TELEPHONE ENCOUNTER
Completed forms for domestic relations  Dr Lucero Rea reviewed and signed  Forms scanned into chart and faxed to domestic relations as requested

## 2021-04-01 DIAGNOSIS — F41.1 GENERALIZED ANXIETY DISORDER: ICD-10-CM

## 2021-04-01 DIAGNOSIS — F33.1 MDD (MAJOR DEPRESSIVE DISORDER), RECURRENT EPISODE, MODERATE (HCC): ICD-10-CM

## 2021-04-04 RX ORDER — MIRTAZAPINE 30 MG/1
30 TABLET, FILM COATED ORAL
Qty: 30 TABLET | Refills: 1 | Status: SHIPPED | OUTPATIENT
Start: 2021-04-04 | End: 2021-09-20

## 2021-09-20 DIAGNOSIS — F33.1 MDD (MAJOR DEPRESSIVE DISORDER), RECURRENT EPISODE, MODERATE (HCC): ICD-10-CM

## 2021-09-20 DIAGNOSIS — F41.1 GENERALIZED ANXIETY DISORDER: ICD-10-CM

## 2021-09-20 RX ORDER — MIRTAZAPINE 30 MG/1
30 TABLET, FILM COATED ORAL
Qty: 30 TABLET | Refills: 1 | Status: SHIPPED | OUTPATIENT
Start: 2021-09-20 | End: 2021-12-06

## 2021-12-04 DIAGNOSIS — F41.1 GENERALIZED ANXIETY DISORDER: ICD-10-CM

## 2021-12-04 DIAGNOSIS — F33.1 MDD (MAJOR DEPRESSIVE DISORDER), RECURRENT EPISODE, MODERATE (HCC): ICD-10-CM

## 2021-12-06 RX ORDER — MIRTAZAPINE 30 MG/1
30 TABLET, FILM COATED ORAL
Qty: 30 TABLET | Refills: 1 | Status: SHIPPED | OUTPATIENT
Start: 2021-12-06 | End: 2022-01-11

## 2021-12-31 DIAGNOSIS — F41.1 GENERALIZED ANXIETY DISORDER: ICD-10-CM

## 2021-12-31 DIAGNOSIS — F33.1 MDD (MAJOR DEPRESSIVE DISORDER), RECURRENT EPISODE, MODERATE (HCC): ICD-10-CM

## 2022-01-11 RX ORDER — MIRTAZAPINE 30 MG/1
30 TABLET, FILM COATED ORAL
Qty: 90 TABLET | Refills: 1 | Status: SHIPPED | OUTPATIENT
Start: 2022-01-11 | End: 2022-03-12

## 2023-02-18 DIAGNOSIS — F41.1 GENERALIZED ANXIETY DISORDER: ICD-10-CM

## 2023-02-18 DIAGNOSIS — F33.1 MDD (MAJOR DEPRESSIVE DISORDER), RECURRENT EPISODE, MODERATE (HCC): ICD-10-CM

## 2023-02-18 RX ORDER — MIRTAZAPINE 30 MG/1
TABLET, FILM COATED ORAL
Qty: 90 TABLET | Refills: 1 | Status: SHIPPED | OUTPATIENT
Start: 2023-02-18

## 2023-02-23 ENCOUNTER — TELEMEDICINE (OUTPATIENT)
Dept: PSYCHIATRY | Facility: CLINIC | Age: 43
End: 2023-02-23

## 2023-02-23 DIAGNOSIS — Z91.199 NO-SHOW FOR APPOINTMENT: Primary | ICD-10-CM

## 2023-02-23 NOTE — PSYCH
No Call  No Show   No Charge    Eden Duggan no showed 02/23/23 appointment , staff called and left message to reschedule appointment     Treatment Plan not completed within required time limits due to: Eden Duggan no show appointment on 02/23/23

## 2023-06-26 ENCOUNTER — TELEPHONE (OUTPATIENT)
Dept: PSYCHIATRY | Facility: CLINIC | Age: 43
End: 2023-06-26

## 2023-06-26 NOTE — TELEPHONE ENCOUNTER
Radha Mireles's call  Caden Martinez wanted to know if provider will be able to prescribe him valium  States that he has been on it for years but does not have an active script  States he has been working on decreasing the dose and wants to make sure provider will work with him  Informed him that Dr Kyle Orosco will not prescribe him valium at this time because he is not an active patient and has not met with him yet  Informed him this will be discussed during appointment  Placed him on wait list for sooner appointment  Instructed him to follow with his PCP for his medication concerns until he meets with our provider

## 2023-06-26 NOTE — TELEPHONE ENCOUNTER
Aleyda Gordon left VM at [facility], he would like a call back from provider or someone form the nursing staff  He has questions about medication and office visit  He can be reached at [phone]

## 2023-09-20 ENCOUNTER — TELEPHONE (OUTPATIENT)
Dept: PSYCHIATRY | Facility: CLINIC | Age: 43
End: 2023-09-20

## 2023-09-20 NOTE — TELEPHONE ENCOUNTER
Pt called in with questions about his appt. Writer checked the chart and the pt is scheduled with Dr Wen Landon on 9/27/2023 . Writer left message for the pt to call back if he has questions.